# Patient Record
Sex: FEMALE | Race: WHITE | NOT HISPANIC OR LATINO | Employment: FULL TIME | URBAN - METROPOLITAN AREA
[De-identification: names, ages, dates, MRNs, and addresses within clinical notes are randomized per-mention and may not be internally consistent; named-entity substitution may affect disease eponyms.]

---

## 2018-11-15 ENCOUNTER — OFFICE VISIT (OUTPATIENT)
Dept: FAMILY MEDICINE CLINIC | Facility: CLINIC | Age: 23
End: 2018-11-15
Payer: COMMERCIAL

## 2018-11-15 VITALS
SYSTOLIC BLOOD PRESSURE: 130 MMHG | WEIGHT: 207.8 LBS | HEIGHT: 66 IN | HEART RATE: 80 BPM | DIASTOLIC BLOOD PRESSURE: 78 MMHG | BODY MASS INDEX: 33.4 KG/M2

## 2018-11-15 DIAGNOSIS — M25.312 SHOULDER JOINT INSTABILITY, LEFT: ICD-10-CM

## 2018-11-15 DIAGNOSIS — G89.29 CHRONIC LEFT SHOULDER PAIN: Primary | ICD-10-CM

## 2018-11-15 DIAGNOSIS — R29.898 SHOULDER WEAKNESS: ICD-10-CM

## 2018-11-15 DIAGNOSIS — M25.512 CHRONIC LEFT SHOULDER PAIN: Primary | ICD-10-CM

## 2018-11-15 DIAGNOSIS — M25.9 SHOULDER DISORDER: ICD-10-CM

## 2018-11-15 PROBLEM — M25.519 SHOULDER PAIN: Status: ACTIVE | Noted: 2018-02-27

## 2018-11-15 PROBLEM — K52.9 COLITIS: Status: ACTIVE | Noted: 2018-02-27

## 2018-11-15 PROBLEM — F17.200 TOBACCO DEPENDENCE SYNDROME: Status: ACTIVE | Noted: 2018-02-27

## 2018-11-15 PROBLEM — E66.9 OBESITY: Status: ACTIVE | Noted: 2018-02-27

## 2018-11-15 PROBLEM — F32.A DEPRESSIVE DISORDER: Status: ACTIVE | Noted: 2018-02-27

## 2018-11-15 PROBLEM — M41.9 SCOLIOSIS DEFORMITY OF SPINE: Status: ACTIVE | Noted: 2018-02-27

## 2018-11-15 PROCEDURE — 99204 OFFICE O/P NEW MOD 45 MIN: CPT | Performed by: PHYSICIAN ASSISTANT

## 2018-11-15 PROCEDURE — 3008F BODY MASS INDEX DOCD: CPT | Performed by: PHYSICIAN ASSISTANT

## 2018-11-15 RX ORDER — NABUMETONE 750 MG/1
750 TABLET, FILM COATED ORAL 2 TIMES DAILY
Qty: 30 TABLET | Refills: 0 | Status: SHIPPED | OUTPATIENT
Start: 2018-11-15 | End: 2020-03-31

## 2018-11-15 NOTE — LETTER
November 15, 2018     Patient: Dannie Malik   YOB: 1995   Date of Visit: 11/15/2018       To Whom it May Concern:    Karlos Jones is under my professional care  She was seen in my office on 11/15/2018  Pt unable to perform job functions at this time due to a L shoulder disorder  She should not return to work until after MRI and ortho specialist evaluation and treatment  If you have any questions or concerns, please don't hesitate to call           Sincerely,          Cat Danielle PA-C        CC: No Recipients

## 2018-11-15 NOTE — PATIENT INSTRUCTIONS
Problem List Items Addressed This Visit        Other    Shoulder pain - Primary    Relevant Medications    nabumetone (RELAFEN) 750 mg tablet    Other Relevant Orders    MRI shoulder left wo contrast    Ambulatory referral to Orthopedic Surgery    Shoulder joint instability, left    Relevant Medications    nabumetone (RELAFEN) 750 mg tablet    Other Relevant Orders    MRI shoulder left wo contrast    Ambulatory referral to Orthopedic Surgery    Shoulder weakness    Relevant Medications    nabumetone (RELAFEN) 750 mg tablet    Other Relevant Orders    MRI shoulder left wo contrast    Ambulatory referral to Orthopedic Surgery    Shoulder disorder     I am highly suspecting rotator cuff tear  X-ray within normal limits check MRI left shoulder  Refer to [facility] either Dr Margaret Mantilla or Dr Radha Wagner  Relafen 750 mg 1 twice daily with food  Patient may wear sling if more comfortable  Patient is unable to perform her expected job functions due to restrictions of movement and pain left shoulder  Patient does have short-term disability which we will complete any paperwork needed when available           Relevant Medications    nabumetone (RELAFEN) 750 mg tablet    Other Relevant Orders    MRI shoulder left wo contrast    Ambulatory referral to Orthopedic Surgery

## 2018-11-15 NOTE — PROGRESS NOTES
Assessment/Plan:    Shoulder disorder  I am highly suspecting rotator cuff tear  X-ray within normal limits check MRI left shoulder  Refer to [facility]ie either Dr Thiago Mackenzie or Dr Gilmar Pak  Relafen 750 mg 1 twice daily with food  Patient may wear sling if more comfortable  Patient is unable to perform her expected job functions due to restrictions of movement and pain left shoulder  Patient does have short-term disability which we will complete any paperwork needed when available  Diagnoses and all orders for this visit:    Chronic left shoulder pain  -     MRI shoulder left wo contrast; Future  -     nabumetone (RELAFEN) 750 mg tablet; Take 1 tablet (750 mg total) by mouth 2 (two) times a day for 14 days  -     Ambulatory referral to Orthopedic Surgery; Future    Shoulder joint instability, left  -     MRI shoulder left wo contrast; Future  -     nabumetone (RELAFEN) 750 mg tablet; Take 1 tablet (750 mg total) by mouth 2 (two) times a day for 14 days  -     Ambulatory referral to Orthopedic Surgery; Future    Shoulder weakness  -     MRI shoulder left wo contrast; Future  -     nabumetone (RELAFEN) 750 mg tablet; Take 1 tablet (750 mg total) by mouth 2 (two) times a day for 14 days  -     Ambulatory referral to Orthopedic Surgery; Future    Shoulder disorder  -     MRI shoulder left wo contrast; Future  -     nabumetone (RELAFEN) 750 mg tablet; Take 1 tablet (750 mg total) by mouth 2 (two) times a day for 14 days  -     Ambulatory referral to Orthopedic Surgery; Future          Subjective:   CC: Pt  Here to reestablish care with practice  C/o pain in left shoulder and decreased ROM  Pain getting increasingly worse over the last several weeks  suzette     Patient ID: Milton Shaw is a 26 y o  female  Patient here today to get re-established with the office  She is complaining of left shoulder pain and did actually have an x-ray which was normal on the 8th of November this month   L shoulder pain for months and then a few weeks ago got worse and had trouble using her arm to drive or lifting arm at work to even put on her desk  No hx of fall or injury  R handed  Has a desk job  Played volleyball in school for about 5-6 years  No hx of injury in the past either  Tried tylenol, ibuprofen, sling for one week, lidocaine patch all w/o help  Saw [facility] and had xray  Unable to bring arm out in front or to her side  Pt  has a short term disability plan and would like to get this in motion as she is unable to perform her daily duties at work as she can not get her L arm up onto desk to work on keyboard due to limited ROM and pain  The following portions of the patient's history were reviewed and updated as appropriate: allergies, current medications, past family history, past medical history, past social history, past surgical history and problem list     Review of Systems   Constitutional: Negative  HENT: Negative  Eyes: Negative  Respiratory: Negative  Cardiovascular: Negative  Gastrointestinal: Negative  Endocrine: Negative  Genitourinary: Negative  Musculoskeletal:        Left shoulder pain   Skin: Negative  Allergic/Immunologic: Negative  Neurological: Negative  Hematological: Negative  Psychiatric/Behavioral: Negative  Objective:      Vitals:    11/15/18 1207   BP: 130/78   BP Location: Left arm   Patient Position: Sitting   Pulse: 80   Weight: 94 3 kg (207 lb 12 8 oz)   Height: 5' 6" (1 676 m)          Physical Exam   Constitutional: She is oriented to person, place, and time  She appears well-developed and well-nourished  No distress  HENT:   Head: Normocephalic and atraumatic  Eyes: Conjunctivae are normal  Right eye exhibits no discharge  Left eye exhibits no discharge  Neck: Neck supple  Carotid bruit is not present  Cardiovascular: Normal rate  Pulmonary/Chest: Effort normal  No respiratory distress     Musculoskeletal:   L shoulder with significant decrease in motion  Pt unable to bring L arm forward past midline  Pt unable to abduct L arm to side past 30 degrees  No crepitus but pain to ant  Acromion process palpation  L shoulder with a downward and anterior displacement compared with R  Deformity made more apparent with posterior L arm to back maneuver  Good  strength  Painful to active ROM  Neurological: She is alert and oriented to person, place, and time  Skin: Skin is warm and dry  She is not diaphoretic  Psychiatric: She has a normal mood and affect  Judgment normal    Nursing note and vitals reviewed

## 2018-11-15 NOTE — ASSESSMENT & PLAN NOTE
I am highly suspecting rotator cuff tear  X-ray within normal limits check MRI left shoulder  Refer to Jessie either Dr Anna Bennett or Dr Chad Kennedy  Relafen 750 mg 1 twice daily with food  Patient may wear sling if more comfortable  Patient is unable to perform her expected job functions due to restrictions of movement and pain left shoulder  Patient does have short-term disability which we will complete any paperwork needed when available

## 2018-11-21 DIAGNOSIS — M25.312 SHOULDER JOINT INSTABILITY, LEFT: ICD-10-CM

## 2018-11-21 DIAGNOSIS — M25.9 SHOULDER DISORDER: ICD-10-CM

## 2018-11-21 DIAGNOSIS — M25.512 CHRONIC LEFT SHOULDER PAIN: ICD-10-CM

## 2018-11-21 DIAGNOSIS — R29.898 SHOULDER WEAKNESS: ICD-10-CM

## 2018-11-21 DIAGNOSIS — G89.29 CHRONIC LEFT SHOULDER PAIN: ICD-10-CM

## 2020-03-31 ENCOUNTER — OFFICE VISIT (OUTPATIENT)
Dept: FAMILY MEDICINE CLINIC | Facility: CLINIC | Age: 25
End: 2020-03-31
Payer: COMMERCIAL

## 2020-03-31 VITALS
BODY MASS INDEX: 34.72 KG/M2 | DIASTOLIC BLOOD PRESSURE: 78 MMHG | WEIGHT: 216 LBS | HEART RATE: 72 BPM | SYSTOLIC BLOOD PRESSURE: 110 MMHG | HEIGHT: 66 IN

## 2020-03-31 DIAGNOSIS — S39.012A BACK STRAIN, INITIAL ENCOUNTER: ICD-10-CM
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
## 2020-03-31 PROCEDURE — 3008F BODY MASS INDEX DOCD: CPT | Performed by: PHYSICIAN ASSISTANT

## 2020-03-31 PROCEDURE — 99214 OFFICE O/P EST MOD 30 MIN: CPT | Performed by: PHYSICIAN ASSISTANT

## 2020-03-31 RX ORDER — MULTIVITAMIN
1 CAPSULE ORAL DAILY
COMMUNITY
End: 2021-01-11

## 2020-03-31 RX ORDER — METHOCARBAMOL 750 MG/1
750 TABLET, FILM COATED ORAL 3 TIMES DAILY PRN
Qty: 30 TABLET | Refills: 0 | Status: SHIPPED | OUTPATIENT
Start: 2020-03-31 | End: 2021-02-09

## 2020-03-31 RX ORDER — KETOROLAC TROMETHAMINE 10 MG/1
10 TABLET, FILM COATED ORAL 2 TIMES DAILY
Qty: 30 TABLET | Refills: 0 | Status: SHIPPED | OUTPATIENT
Start: 2020-03-31 | End: 2021-03-18

## 2020-03-31 RX ORDER — NAPROXEN 500 MG/1
500 TABLET ORAL 2 TIMES DAILY WITH MEALS
COMMUNITY
End: 2020-03-31
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
## 2020-12-30 ENCOUNTER — TELEMEDICINE (OUTPATIENT)
Dept: FAMILY MEDICINE CLINIC | Facility: CLINIC | Age: 25
End: 2020-12-30
Payer: COMMERCIAL

## 2020-12-30 VITALS — WEIGHT: 207 LBS | HEIGHT: 66 IN | BODY MASS INDEX: 33.27 KG/M2

## 2020-12-30 DIAGNOSIS — Z20.822 CLOSE EXPOSURE TO COVID-19 VIRUS: Primary | ICD-10-CM

## 2020-12-30 PROCEDURE — 99213 OFFICE O/P EST LOW 20 MIN: CPT | Performed by: NURSE PRACTITIONER

## 2021-01-11 ENCOUNTER — ULTRASOUND (OUTPATIENT)
Dept: OBGYN CLINIC | Facility: CLINIC | Age: 26
End: 2021-01-11
Payer: COMMERCIAL

## 2021-01-11 VITALS — DIASTOLIC BLOOD PRESSURE: 74 MMHG | BODY MASS INDEX: 37.19 KG/M2 | WEIGHT: 230.4 LBS | SYSTOLIC BLOOD PRESSURE: 102 MMHG

## 2021-01-11 DIAGNOSIS — N91.1 AMENORRHEA, SECONDARY: Primary | ICD-10-CM

## 2021-01-11 PROCEDURE — 1036F TOBACCO NON-USER: CPT | Performed by: OBSTETRICS & GYNECOLOGY

## 2021-01-11 PROCEDURE — 99204 OFFICE O/P NEW MOD 45 MIN: CPT | Performed by: OBSTETRICS & GYNECOLOGY

## 2021-01-17 PROCEDURE — 76830 TRANSVAGINAL US NON-OB: CPT | Performed by: OBSTETRICS & GYNECOLOGY

## 2021-01-17 NOTE — PROGRESS NOTES
Assessment/Plan:     @ 6 weeks 3 days [facility] 9/3/2021    Repeat sono 2 weeks      Subjective      Daphine Conner is a 23 y o   LMP early November presents with amenorrhea and + urine hCG  Cycle length: irregular  Pregnancy testing: at home  Pregnancy imaging: not done  Past Medical History:   Diagnosis Date    Multiple abrasions     Proteinuria     Urinary tract infection        Current Outpatient Medications on File Prior to Visit   Medication Sig    Prenatal MV-Min-Fe Fum-FA-DHA (PRENATAL+DHA PO) Take by mouth    ketorolac (TORADOL) 10 mg tablet Take 1 tablet (10 mg total) by mouth 2 (two) times a day (Patient not taking: Reported on 2020)    methocarbamol (ROBAXIN) 750 mg tablet Take 1 tablet (750 mg total) by mouth 3 (three) times a day as needed for muscle spasms for up to 20 days     No current facility-administered medications on file prior to visit  The following portions of the patient's history were reviewed and updated as appropriate: allergies, past family history, past social history, past surgical history and problem list     Review of Systems  Pertinent items are noted in HPI  Objective      /74 (BP Location: Left arm, Patient Position: Sitting, Cuff Size: Large)   Wt 105 kg (230 lb 6 4 oz)   LMP 2020 (Approximate)   BMI 37 19 kg/m²     Physical Exam  Constitutional:       Appearance: Normal appearance  Genitourinary:      Vulva normal       No vaginal bleeding  HENT:      Head: Normocephalic  Cardiovascular:      Rate and Rhythm: Normal rate and regular rhythm  Pulmonary:      Effort: Pulmonary effort is normal    Abdominal:      Palpations: Abdomen is soft  Tenderness: There is no abdominal tenderness  Musculoskeletal:         General: No swelling  Neurological:      General: No focal deficit present  Mental Status: She is alert and oriented to person, place, and time  Skin:     General: Skin is warm and dry  Psychiatric:         Mood and Affect: Mood normal          Behavior: Behavior normal    Vitals signs reviewed  AMB US Pelvic Non OB    Date/Time: 1/17/2021 12:53 PM  Performed by: Gavi Flores MD  Authorized by: Gavi Flores MD   Universal Protocol:  Consent: Verbal consent obtained  Consent given by: patient  Time out: Immediately prior to procedure a "time out" was called to verify the correct patient, procedure, equipment, support staff and site/side marked as required  Patient understanding: patient states understanding of the procedure being performed  Patient identity confirmed: verbally with patient      Procedure details:     Indications comment:  Amenorrhea, + urine hCG, unsure dates    Technique:  Transvaginal US, Non-OB    Position: lithotomy exam    Uterine findings:     Adnexal mass: not identified      Myomas: not identified    Cervix findings:      closed  Left ovary findings:     Left ovary:  Visualized    Cysts: not identified    Right ovary findings:     Right ovary:  Visualized    Cysts: not identified    Other findings:     Free pelvic fluid: not identified    Post-Procedure Details:     Impression:  Single viable intrauterine gestation CRL 0 62cm c/w EGA 6 weeks 3 days +yolk sac FHM 130bpm    Tolerance:   Tolerated well, no immediate complications    Complications: no complications

## 2021-01-25 ENCOUNTER — ULTRASOUND (OUTPATIENT)
Dept: OBGYN CLINIC | Facility: CLINIC | Age: 26
End: 2021-01-25
Payer: COMMERCIAL

## 2021-01-25 VITALS — WEIGHT: 229 LBS | SYSTOLIC BLOOD PRESSURE: 112 MMHG | BODY MASS INDEX: 36.96 KG/M2 | DIASTOLIC BLOOD PRESSURE: 64 MMHG

## 2021-01-25 DIAGNOSIS — N91.2 AMENORRHEA: Primary | ICD-10-CM

## 2021-01-25 PROCEDURE — 76830 TRANSVAGINAL US NON-OB: CPT | Performed by: OBSTETRICS & GYNECOLOGY

## 2021-01-25 NOTE — PROGRESS NOTES
AMB US Pelvic Non OB    Date/Time: 1/25/2021 2:21 PM  Performed by: Elbert Azevedo MD  Authorized by: Elbert Azevedo MD   Universal Protocol:  Consent: Verbal consent obtained  Risks and benefits: risks, benefits and alternatives were discussed  Time out: Immediately prior to procedure a "time out" was called to verify the correct patient, procedure, equipment, support staff and site/side marked as required  Patient understanding: patient states understanding of the procedure being performed  Patient identity confirmed: verbally with patient      Procedure details:     Indications comment:  Amenorrhea, + urine hCG, size not c/w dates    Technique:  Transvaginal US, Non-OB    Position: lithotomy exam    Uterine findings:     Adnexal mass: not identified      Myomas: not identified    Cervix findings:      closed  Left ovary findings:     Left ovary:  Visualized    Cysts: not identified    Right ovary findings:     Right ovary:  Visualized    Cysts: not identified    Other findings:     Free pelvic fluid: not identified    Post-Procedure Details:     Impression:  Single viable intrauterine gestation CRL 2 29cm (EGA 9w0d) c/w EGA of 8w 3d by previous sono  + yolk sac   bpm       Tolerance:   Tolerated well, no immediate complications  Additional Procedure Comments:      [facility] 9/3/2021

## 2021-02-09 ENCOUNTER — TELEMEDICINE (OUTPATIENT)
Dept: OBGYN CLINIC | Facility: CLINIC | Age: 26
End: 2021-02-09

## 2021-02-09 VITALS — HEIGHT: 66 IN | WEIGHT: 229 LBS | BODY MASS INDEX: 36.8 KG/M2

## 2021-02-09 DIAGNOSIS — Z34.01 FIRST PREGNANCY, FIRST TRIMESTER: Primary | ICD-10-CM

## 2021-02-09 PROCEDURE — OBC: Performed by: OBSTETRICS & GYNECOLOGY

## 2021-02-09 RX ORDER — OMEPRAZOLE 20 MG/1
20 CAPSULE, DELAYED RELEASE ORAL DAILY
COMMUNITY
End: 2021-03-18

## 2021-02-09 RX ORDER — DIPHENHYDRAMINE HYDROCHLORIDE 25 MG/1
25 CAPSULE ORAL DAILY
COMMUNITY
End: 2021-03-18

## 2021-02-09 NOTE — PATIENT INSTRUCTIONS
Pregnancy at 11 to 14 Weeks   AMBULATORY CARE:   Changes happening to your body: You are now at the end of your first trimester and entering your second trimester  Morning sickness usually goes away by this time  You may have other symptoms such as fatigue, frequent urination, and headaches  You may have gained 2 to 4 pounds by now  Seek care immediately if:   · You have pain or cramping in your abdomen or low back  · You have heavy vaginal bleeding or clotting  · You pass material that looks like tissue or large clots  Collect the material and bring it with you  Call your doctor or obstetrician if:   · You cannot keep food or drinks down, and you are losing weight  · You have light vaginal bleeding  · You have chills or a fever  · You have vaginal itching, burning, or pain  · You have yellow, green, white, or foul-smelling vaginal discharge  · You have pain or burning when you urinate, less urine than usual, or pink or bloody urine  · You have questions or concerns about your condition or care  How to care for yourself at this stage of your pregnancy:   · Get plenty of rest   You may feel more tired than normal  You may need to take naps or go to bed earlier  · Manage nausea and vomiting  Avoid fatty and spicy foods  Eat small meals throughout the day instead of large meals  Elizabeth may help to decrease nausea  Ask your healthcare provider about other ways of decreasing nausea and vomiting  · Eat a variety of healthy foods  Healthy foods include fruits, vegetables, whole-grain breads, low-fat dairy foods, beans, lean meats, and fish  Drink liquids as directed  Ask how much liquid to drink each day and which liquids are best for you  Limit caffeine to less than 200 milligrams each day  Limit your intake of fish to 2 servings each week  Choose fish low in mercury such as canned light tuna, shrimp, salmon, cod, or tilapia   Do not  eat fish high in mercury such as swordfish, tilefish, kelly mackerel, and shark  · Take prenatal vitamins as directed  Your need for certain vitamins and minerals, such as folic acid, increases during pregnancy  Prenatal vitamins provide some of the extra vitamins and minerals you need  Prenatal vitamins may also help to decrease the risk of certain birth defects  · Do not smoke  Smoking increases your risk of a miscarriage and other health problems during your pregnancy  Smoking can cause your baby to be born too early or weigh less at birth  Ask your healthcare provider for information if you need help quitting  · Do not drink alcohol  Alcohol passes from your body to your baby through the placenta  It can affect your baby's brain development and cause fetal alcohol syndrome (FAS)  FAS is a group of conditions that causes mental, behavior, and growth problems  · Talk to your healthcare provider before you take any medicines  Many medicines may harm your baby if you take them when you are pregnant  Do not take any medicines, vitamins, herbs, or supplements without first talking to your healthcare provider  Never use illegal or street drugs (such as marijuana or cocaine) while you are pregnant  Safety tips during pregnancy:   · Avoid hot tubs and saunas  Do not use a hot tub or sauna while you are pregnant, especially during your first trimester  Hot tubs and saunas may raise your baby's temperature and increase the risk of birth defects  · Avoid toxoplasmosis  This is an infection caused by eating raw meat or being around infected cat feces  It can cause birth defects, miscarriages, and other problems  Wash your hands after you touch raw meat  Make sure any meat is well-cooked before you eat it  Avoid raw eggs and unpasteurized milk  Use gloves or ask someone else to clean your cat's litter box while you are pregnant  Changes happening with your baby: Your baby has fully formed fingernails and toenails   Your baby's heartbeat can now be heard  Ask your healthcare provider if you can listen to your baby's heartbeat  By week 14, your baby is over 4 inches long from the top of the head to the rump (baby's bottom)  Your baby weighs over 3 ounces  Prenatal care:  Prenatal care is a series of visits with your healthcare provider throughout your pregnancy  During the first 28 weeks of your pregnancy, you will see your healthcare provider 1 time each month  Prenatal care can help prevent problems during pregnancy and childbirth  Your healthcare provider will check your blood pressure and weight  Your baby's heart rate will also be checked  You may also need the following at some visits:  · A pelvic exam  allows your healthcare provider to see your cervix (the bottom part of your uterus)  Your healthcare provider will use a speculum to open your vagina  He or she will check the size and shape of your uterus  · Blood tests  may be done to check for any of the following:     ? Gestational diabetes or anemia (low iron level)    ? Blood type or Rh factor, or certain birth defects    ? Immunity to certain diseases, such as chickenpox or rubella    ? An infection, such as a sexually transmitted infection, HIV, or hepatitis B    · Hepatitis B  may need to be prevented or treated  Hepatitis B is inflammation of the liver caused by the hepatitis B virus (HBV)  HBV can spread from a mother to her baby during delivery  You will be checked for HBV as early as possible in the first trimester of each pregnancy  You need the test even if you received the hepatitis B vaccine or were tested before  You may need to have an HBV infection treated before you give birth  · Urine tests  may also be done to check for sugar and protein  These can be signs of gestational diabetes or preeclampsia  Urine tests may also be done to check for signs of infection  · A fetal ultrasound  shows pictures of your baby inside your uterus   The pictures are used to check your baby's development, movement, and position  · Genetic disorder screening tests  may be offered to you  These tests check your baby's risk for genetic disorders such as Down syndrome  A screening test includes a blood test and ultrasound  Follow up with your doctor or obstetrician as directed:  Go to all prenatal visits  Write down your questions so you remember to ask them during your visits  © Copyright [address] Information is for End User's use only and may not be sold, redistributed or otherwise used for commercial purposes  All illustrations and images included in CareNotes® are the copyrighted property of A D A M , Inc  or [address] [place] [address] [facility]  The above information is an  only  It is not intended as medical advice for individual conditions or treatments  Talk to your doctor, nurse or pharmacist before following any medical regimen to see if it is safe and effective for you  Nausea and Vomiting in Pregnancy   AMBULATORY CARE:   Nausea and vomiting in pregnancy  can happen any time of day  These symptoms usually start before the 9th week of pregnancy, and end by the 14th week (second trimester)  Some women can have nausea and vomiting for a longer time  These symptoms can affect some women throughout the entire pregnancy  Nausea and vomiting do not harm your baby  These symptoms can make it hard for you to do your daily activities  Seek care immediately if:   · You have signs of dehydration  Examples are dark yellow urine, dry mouth and lips, dry skin, fast heartbeat, and urinating less than usual     · You have severe abdominal pain  · You feel too weak or dizzy to stand up  · You see blood in your vomit or bowel movements  Contact your healthcare provider if:   · You vomit more than 4 times in 1 day  · You have not been able to keep liquids down for more than 1 day  · You lose more than 2 pounds  · You have a fever      · Your nausea and vomiting continue longer than 14 weeks  · You have questions or concerns about your condition or care  Treatment  for nausea and vomiting in pregnancy is usually not needed  You can make changes in the foods you eat and in your activities to help manage your symptoms  You may need to try several things to learn what works for you  Talk to your healthcare provider if your symptoms do not decrease with the changes suggested below  You may need vitamin B6 and medicine if these changes do not help, or your symptoms become severe  Nutrition changes you can make to manage nausea and vomiting:   · Eat small meals throughout the day instead of 3 large meals  You may be more likely to have nausea and vomiting when your stomach is empty  Eat foods that are low in fat and high in protein  Examples are lean meat, beans, turkey, and chicken without the skin  Eat a small snack, such as crackers, dry cereal, or a small sandwich before you go to bed  · Eat some crackers or dry toast before you get out of bed in the morning  Get out of bed slowly  Sudden movements could cause you to get dizzy and nauseated  · Eat bland foods when you feel nauseated  Examples of bland foods include dry toast, dry cereal, plain pasta, white rice, and bread  Other bland foods include saltine crackers, bananas, gelatin, and pretzels  Avoid spicy, greasy, and fried foods  Avoid any other foods that make you feel nauseated  · Drink liquids that contain ginger  Drink ginger ale made with real ginger or ginger tea made with fresh grated ginger  Ginger capsules or ginger candies may also help to decrease nausea and vomiting  · Drink liquids between meals instead of with meals  Wait at least 30 minutes after you eat to drink liquids  Drink small amounts of liquids often throughout the day to prevent dehydration  Ask how much liquid you should drink each day      Other changes you can make to manage nausea and vomiting:   · Avoid smells that bother you   Strong odors may cause nausea and vomiting to start, or make it worse  Take a short walk, turn on a fan, or try to sleep with the window open to get fresh air  When you are cooking, open windows to get rid of smells that may cause nausea  · Do not brush your teeth right after you eat  if it makes you nauseated  · Rest when you need to  Start activity slowly and work up to your usual routine as you start to feel better  · Talk to your healthcare provider about your prenatal vitamins  Prenatal vitamins can cause nausea for some women  Try taking your prenatal vitamin at night or with a snack  If this change does not help, your healthcare provider may recommend a different type of vitamin  · Do not use any medicines, vitamins, or supplements to manage your symptoms without asking your healthcare provider  Many medicines can harm an unborn baby  · Light to moderate exercise  may help to decrease your symptoms  It may also help you to sleep better at night  Ask your healthcare provider about the best exercise plan for you  Follow up with your healthcare provider as directed:  Write down your questions so you remember to ask them during your visits  © Copyright [address] Information is for End User's use only and may not be sold, redistributed or otherwise used for commercial purposes  All illustrations and images included in CareNotes® are the copyrighted property of A [employer] , [employer]  or [address] [place], [place] [address]lindsay   The above information is an  only  It is not intended as medical advice for individual conditions or treatments  Talk to your doctor, nurse or pharmacist before following any medical regimen to see if it is safe and effective for you

## 2021-02-09 NOTE — PROGRESS NOTES
OB INTAKE INTERVIEW  **  *Hx of  delivery prior to 36 weeks 6 days: No  *Last Menstrual Period: Pt's LMP was: November  *Ultrasound date:21   6weeks 3days  *Estimated date of delivery: 9/3/21   * Confirmed by: US    *Signs/Symptoms of Pregnancy   *Current pregnancy symptoms: Nausea, & vomiting approx 4 x a day since last week, now has decreased to 1-2 daily and does have days with no vomiting, Advised smaller food portions, dry crackers, toast, dry cereal, and to keep hydrated    *Constipation - Yes, having some issues with BM's, not going as frequently as normal but has not been eating as well  *Headaches - No   *Cramping/spotting - Yes, occasional cramping in the morning, resolves on its own    *PICA cravings - No    *Diabetes- If you answer YES to 1 of the following, please order 1 hour GTT, 50g    *History of GDM: No    *BMI >35: Yes    *History of PCOS and/or on Metformin: No    *Prior history of LGA/Macrosomia (>9lb):  No    -If you answer YES to 2 or more of the following, please order 1 hour GTT, 50g    *BMI >30: Yes  *First degree relative with type 2 diabetes: No    *AMA with other risk factors: No    *History of CHTN, Hyperlipidemia, Elevated A1c: No    *High risk race (, , ,  or [place]): No,     *Hypertension- if you answer yes, please order preeclampsia labs including 24 hour urine protein   *Hx of chronic HTN: No   *Hx of gestational HTN: No   *Hx of preeclampsia, eclampsia, or HELLP syndrome: No    *Infection Screening-    *Does the pt have a hx of MRSA?: No    *If yes- please follow MRSA protocol and obtain a nasal swab for MRSA culture   *History of herpes?: No   *Ok for blood transfusion: Yes    *Immunizations:   *Influenza vaccine given today: No   *Discussed Tdap vaccine: Yes     *Interview education   *[facility] Pregnancy Essentials Book reviewed and discussed: Yes    *Handouts given:    *[facility] support center    *[facility]     *Discussed genetic testing: Yes     *Appointment at [facility] made: Yes       *Routine Prenatal lab work ordered: Yes     *Did patients risk factors prompt additional lab work at this time?: Yes     *Nurse/Family Partnership- pt may qualify Yes; referral placed Yes     *4 P's- substance abuse screening    Presently using? No    Past use? No    Partner using? No    Parents/Family using? No    *Details that I feel the provider should be aware of:  , increased BMI, early glucose test ordered  Works from home as a  for [address] [place] [address]  Advised to call [facility] for appointment as well as having prenatal labs done  PN1 visit scheduled  The patient was oriented to our practice and all questions were answered    Telephone visit lasting approx 50 mins     Interviewed by: Sonu ODOM

## 2021-02-19 ENCOUNTER — TELEPHONE (OUTPATIENT)
Dept: PERINATAL CARE | Facility: OTHER | Age: 26
End: 2021-02-19

## 2021-02-19 NOTE — TELEPHONE ENCOUNTER
Spoke with patient and confirmed appointment with [facility]  1 support person ( must be over age of 15) may accompany you for your appointment  You and your support person must wear a mask ( [place] Dept of Health)  [facility] does not allow cell phone use, recording device or streaming during the ultrasound  Instructed to call [facility] office prior to entering building  Check in and rooming questions will be done via phone  Inside office # provided:  ?    [employer] line: [phone] Do you or your support person currently have:  Fever or flu- like symptoms? NO  Symptoms of upper respiratory infection like runny nose, sore throat or cough? NO  Do you have new headache that you have not had in the past?NO  Have you experienced any new shortness of breath recently? NO  Do you have any new diarrhea, nausea or vomiting? NO  Have you recently been in contact with anyone who has been sick or diagnosed with COVID-19 infection? NO  Have you been recommended to quarantine because of an exposure to a confirmed positive COVID19 person? NO  You and your support person will be screened upon arrival   ?  Patient verbalized understanding of all instructions    YES

## 2021-02-22 ENCOUNTER — ROUTINE PRENATAL (OUTPATIENT)
Dept: PERINATAL CARE | Facility: OTHER | Age: 26
End: 2021-02-22
Payer: COMMERCIAL

## 2021-02-22 VITALS
SYSTOLIC BLOOD PRESSURE: 130 MMHG | HEART RATE: 98 BPM | DIASTOLIC BLOOD PRESSURE: 61 MMHG | WEIGHT: 220 LBS | HEIGHT: 66 IN | BODY MASS INDEX: 35.36 KG/M2

## 2021-02-22 DIAGNOSIS — O99.211 MATERNAL OBESITY, ANTEPARTUM, FIRST TRIMESTER: Primary | ICD-10-CM

## 2021-02-22 DIAGNOSIS — Z3A.12 12 WEEKS GESTATION OF PREGNANCY: ICD-10-CM

## 2021-02-22 DIAGNOSIS — Z36.82 ENCOUNTER FOR ANTENATAL SCREENING FOR NUCHAL TRANSLUCENCY: ICD-10-CM

## 2021-02-22 DIAGNOSIS — O99.331 TOBACCO USE IN PREGNANCY, FIRST TRIMESTER: ICD-10-CM

## 2021-02-22 PROCEDURE — 76813 OB US NUCHAL MEAS 1 GEST: CPT | Performed by: OBSTETRICS & GYNECOLOGY

## 2021-02-22 PROCEDURE — 3008F BODY MASS INDEX DOCD: CPT | Performed by: OBSTETRICS & GYNECOLOGY

## 2021-02-22 PROCEDURE — 99203 OFFICE O/P NEW LOW 30 MIN: CPT | Performed by: OBSTETRICS & GYNECOLOGY

## 2021-02-22 PROCEDURE — 1036F TOBACCO NON-USER: CPT | Performed by: OBSTETRICS & GYNECOLOGY

## 2021-02-22 RX ORDER — ASPIRIN 81 MG/1
162 TABLET, CHEWABLE ORAL DAILY
Qty: 180 TABLET | Refills: 1 | Status: SHIPPED | OUTPATIENT
Start: 2021-02-22 | End: 2021-08-19

## 2021-02-22 NOTE — PROGRESS NOTES
Please refer to the [facility] ultrasound report in Ob Procedures for additional information regarding today's visit

## 2021-02-22 NOTE — LETTER
February 22, 2021     Lacey FrenchTitus, [address] [place] [address]    Patient: Coleen Fletcher   YOB: 1995   Date of Visit: 2/22/2021       Dear Dr Gregory Bee: Thank you for referring Claudell Crete to me for evaluation  Below are my notes for this consultation  If you have questions, please do not hesitate to call me  I look forward to following your patient along with you  Sincerely,        Kayla Howe MD        CC: No Recipients  Kayla Howe MD  2/22/2021 10:38 AM  Sign when Signing Visit    Please refer to the [facility] ultrasound report in Ob Procedures for additional information regarding today's visit

## 2021-02-22 NOTE — PROGRESS NOTES
[id number] lab ordered  Instructed patient on process for checking her OOP cost via [employer] [facility]  Provided [id number] instruction card toll free # [phone]  Patient made aware if [id number]  unable to give an estimate she will need to contact M office prior to blood draw  Patient aware that  is provided by third party and is only an estimate of cost not a guarantee  Insurance may require prior authorization, if test drawn without prior authorization she will be responsible for full cost of test   For definitive OOP cost, lab deductible or if lab authorization is required patient encouraged to call her insurance provider  Explained customer service insurance phone # located on the back of her ID card  [facility] will have results in approximately 7-10 business days and will call patient or notify via Diana Carlson  Patient aware viewing lab result reveals gender  Patient verbalized understanding of all instructions and no questions at this time

## 2021-02-26 ENCOUNTER — ROUTINE PRENATAL (OUTPATIENT)
Dept: OBGYN CLINIC | Facility: CLINIC | Age: 26
End: 2021-02-26

## 2021-02-26 VITALS — BODY MASS INDEX: 35.25 KG/M2 | SYSTOLIC BLOOD PRESSURE: 118 MMHG | WEIGHT: 218.4 LBS | DIASTOLIC BLOOD PRESSURE: 64 MMHG

## 2021-02-26 DIAGNOSIS — Z34.02 ENCOUNTER FOR SUPERVISION OF NORMAL FIRST PREGNANCY IN SECOND TRIMESTER: Primary | ICD-10-CM

## 2021-02-26 DIAGNOSIS — Z11.3 SCREENING FOR STD (SEXUALLY TRANSMITTED DISEASE): ICD-10-CM

## 2021-02-26 DIAGNOSIS — Z34.01 FIRST PREGNANCY, FIRST TRIMESTER: ICD-10-CM

## 2021-02-26 LAB
SL AMB  POCT GLUCOSE, UA: ABNORMAL
SL AMB POCT URINE PROTEIN: ABNORMAL

## 2021-02-26 PROCEDURE — G0145 SCR C/V CYTO,THINLAYER,RESCR: HCPCS | Performed by: OBSTETRICS & GYNECOLOGY

## 2021-02-26 PROCEDURE — PNV: Performed by: OBSTETRICS & GYNECOLOGY

## 2021-02-26 PROCEDURE — 87491 CHLMYD TRACH DNA AMP PROBE: CPT | Performed by: OBSTETRICS & GYNECOLOGY

## 2021-02-26 PROCEDURE — 87591 N.GONORRHOEAE DNA AMP PROB: CPT | Performed by: OBSTETRICS & GYNECOLOGY

## 2021-02-26 NOTE — PROGRESS NOTES
First OB visit  Blue folder given  Denies leaking of fluids or vaginal bleeding  Left lower quadrant "cramping"  No PN1 labs done including glucose  Pap/GC/CH today

## 2021-02-26 NOTE — PROGRESS NOTES
Assessment:    Pregnancy at 13 and 0/7 weeks      Plan     Initial labs ordered  Prenatal vitamins  Problem list reviewed and updated  Had genetic screening sono - normal  Role of ultrasound in pregnancy discussed; fetal survey: ordered  Follow up in 4 weeks  Greater than 50% of 30 min visit spent on counseling and coordination of care  Adrianna Crockett is being seen today for her first obstetrical visit  This is a planned pregnancy  She is at 13w0d gestation  Her obstetrical history is significant for obesity  Relationship with FOB: significant other, living together  Patient does intend to breast feed  Pregnancy history fully reviewed  Menstrual History:  OB History        1    Para        Term                AB        Living           SAB        TAB        Ectopic        Multiple        Live Births                    Patient's last menstrual period was 2020 (approximate)  Past Medical History:   Diagnosis Date    Chronic back pain     Multiple abrasions     Proteinuria     Urinary tract infection     pylonephritis       Past Surgical History:   Procedure Laterality Date    TONSILLECTOMY  2009    WISDOM TOOTH EXTRACTION         Current Outpatient Medications on File Prior to Visit   Medication Sig    aspirin 81 mg chewable tablet Chew 2 tablets (162 mg total) daily    Doxylamine Succinate, Sleep, (UNISOM PO) Take 12 5 mg by mouth    ketorolac (TORADOL) 10 mg tablet Take 1 tablet (10 mg total) by mouth 2 (two) times a day (Patient not taking: Reported on 2020)    omeprazole (PriLOSEC) 20 mg delayed release capsule Take 20 mg by mouth daily    Prenatal MV-Min-Fe Fum-FA-DHA (PRENATAL+DHA PO) Take by mouth    Pyridoxine HCl (vitamin B-6) 25 MG tablet Take 25 mg by mouth daily     No current facility-administered medications on file prior to visit          No Known Allergies    Social History     Tobacco Use    Smoking status: Former Smoker Quit date: 2020     Years since quittin 2    Smokeless tobacco: Never Used   Substance Use Topics    Alcohol use: Not Currently    Drug use: No       Family History   Problem Relation Age of Onset    Hypertension Mother    Gabby Muljose alfredo Arthritis Mother     Depression Mother     COPD Mother     Gestational diabetes Mother     Lung cancer Paternal Grandmother     Cancer Paternal Grandmother     COPD Father     Depression Sister     Anxiety disorder Sister     Depression Brother     Bipolar disorder Brother     Depression Brother     No Known Problems Maternal Grandmother     COPD Maternal Grandfather     Cancer Maternal Grandfather     Heart disease Paternal Grandfather     Depression Brother        Review of Systems  Pertinent items are noted in HPI        Objective  Vitals:    21 0910   BP: 118/64         See prenatal physical

## 2021-03-02 LAB
C TRACH DNA SPEC QL NAA+PROBE: NEGATIVE
N GONORRHOEA DNA SPEC QL NAA+PROBE: NEGATIVE

## 2021-03-03 LAB
LAB AP GYN PRIMARY INTERPRETATION: NORMAL
Lab: NORMAL

## 2021-03-18 ENCOUNTER — HOSPITAL ENCOUNTER (EMERGENCY)
Facility: HOSPITAL | Age: 26
Discharge: HOME | End: 2021-03-18
Attending: EMERGENCY MEDICINE
Payer: COMMERCIAL

## 2021-03-18 ENCOUNTER — TELEPHONE (OUTPATIENT)
Dept: OBGYN CLINIC | Facility: CLINIC | Age: 26
End: 2021-03-18

## 2021-03-18 VITALS
SYSTOLIC BLOOD PRESSURE: 110 MMHG | WEIGHT: 218.26 LBS | TEMPERATURE: 99 F | OXYGEN SATURATION: 96 % | BODY MASS INDEX: 35.23 KG/M2 | HEART RATE: 84 BPM | DIASTOLIC BLOOD PRESSURE: 63 MMHG | RESPIRATION RATE: 18 BRPM

## 2021-03-18 DIAGNOSIS — R06.02 SOB (SHORTNESS OF BREATH): Primary | ICD-10-CM

## 2021-03-18 DIAGNOSIS — R51.9 HEADACHE: Primary | ICD-10-CM

## 2021-03-18 DIAGNOSIS — Z3A.15 15 WEEKS GESTATION OF PREGNANCY: ICD-10-CM

## 2021-03-18 LAB
ALBUMIN SERPL BCP-MCNC: 2.8 G/DL (ref 3.5–5)
ALP SERPL-CCNC: 94 U/L (ref 46–116)
ALT SERPL W P-5'-P-CCNC: 16 U/L (ref 12–78)
ANION GAP SERPL CALCULATED.3IONS-SCNC: 8 MMOL/L (ref 4–13)
AST SERPL W P-5'-P-CCNC: 7 U/L (ref 5–45)
ATRIAL RATE: 88 BPM
BASOPHILS # BLD AUTO: 0.06 THOUSANDS/ΜL (ref 0–0.1)
BASOPHILS NFR BLD AUTO: 0 % (ref 0–1)
BILIRUB SERPL-MCNC: 0.11 MG/DL (ref 0.2–1)
BILIRUB UR QL STRIP: NEGATIVE
BUN SERPL-MCNC: 6 MG/DL (ref 5–25)
CALCIUM ALBUM COR SERPL-MCNC: 10.2 MG/DL (ref 8.3–10.1)
CALCIUM SERPL-MCNC: 9.2 MG/DL (ref 8.3–10.1)
CHLORIDE SERPL-SCNC: 103 MMOL/L (ref 100–108)
CLARITY UR: NORMAL
CO2 SERPL-SCNC: 24 MMOL/L (ref 21–32)
COLOR UR: YELLOW
CREAT SERPL-MCNC: 0.62 MG/DL (ref 0.6–1.3)
EOSINOPHIL # BLD AUTO: 0.08 THOUSAND/ΜL (ref 0–0.61)
EOSINOPHIL NFR BLD AUTO: 1 % (ref 0–6)
ERYTHROCYTE [DISTWIDTH] IN BLOOD BY AUTOMATED COUNT: 14.4 % (ref 11.6–15.1)
FLUAV RNA RESP QL NAA+PROBE: NEGATIVE
FLUBV RNA RESP QL NAA+PROBE: NEGATIVE
GFR SERPL CREATININE-BSD FRML MDRD: 128 ML/MIN/1.73SQ M
GLUCOSE SERPL-MCNC: 85 MG/DL (ref 65–140)
GLUCOSE UR STRIP-MCNC: NEGATIVE MG/DL
HCT VFR BLD AUTO: 36.2 % (ref 34.8–46.1)
HGB BLD-MCNC: 11.9 G/DL (ref 11.5–15.4)
HGB UR QL STRIP.AUTO: NEGATIVE
IMM GRANULOCYTES # BLD AUTO: 0.09 THOUSAND/UL (ref 0–0.2)
IMM GRANULOCYTES NFR BLD AUTO: 1 % (ref 0–2)
KETONES UR STRIP-MCNC: NEGATIVE MG/DL
LEUKOCYTE ESTERASE UR QL STRIP: NEGATIVE
LYMPHOCYTES # BLD AUTO: 2.6 THOUSANDS/ΜL (ref 0.6–4.47)
LYMPHOCYTES NFR BLD AUTO: 19 % (ref 14–44)
MCH RBC QN AUTO: 28.5 PG (ref 26.8–34.3)
MCHC RBC AUTO-ENTMCNC: 32.9 G/DL (ref 31.4–37.4)
MCV RBC AUTO: 87 FL (ref 82–98)
MONOCYTES # BLD AUTO: 0.84 THOUSAND/ΜL (ref 0.17–1.22)
MONOCYTES NFR BLD AUTO: 6 % (ref 4–12)
NEUTROPHILS # BLD AUTO: 9.91 THOUSANDS/ΜL (ref 1.85–7.62)
NEUTS SEG NFR BLD AUTO: 73 % (ref 43–75)
NITRITE UR QL STRIP: NEGATIVE
NRBC BLD AUTO-RTO: 0 /100 WBCS
P AXIS: 60 DEGREES
PH UR STRIP.AUTO: 6 [PH] (ref 4.5–8)
PLATELET # BLD AUTO: 209 THOUSANDS/UL (ref 149–390)
PMV BLD AUTO: 12.3 FL (ref 8.9–12.7)
POTASSIUM SERPL-SCNC: 3.8 MMOL/L (ref 3.5–5.3)
PR INTERVAL: 150 MS
PROT SERPL-MCNC: 7 G/DL (ref 6.4–8.2)
PROT UR STRIP-MCNC: NEGATIVE MG/DL
QRS AXIS: 68 DEGREES
QRSD INTERVAL: 86 MS
QT INTERVAL: 340 MS
QTC INTERVAL: 411 MS
RBC # BLD AUTO: 4.17 MILLION/UL (ref 3.81–5.12)
RSV RNA RESP QL NAA+PROBE: NEGATIVE
SARS-COV-2 RNA RESP QL NAA+PROBE: NEGATIVE
SODIUM SERPL-SCNC: 135 MMOL/L (ref 136–145)
SP GR UR STRIP.AUTO: 1.02 (ref 1–1.03)
T WAVE AXIS: 0 DEGREES
TSH SERPL DL<=0.05 MIU/L-ACNC: 2.06 UIU/ML (ref 0.36–3.74)
UROBILINOGEN UR QL STRIP.AUTO: 0.2 E.U./DL
VENTRICULAR RATE: 88 BPM
WBC # BLD AUTO: 13.58 THOUSAND/UL (ref 4.31–10.16)

## 2021-03-18 PROCEDURE — 80053 COMPREHEN METABOLIC PANEL: CPT | Performed by: EMERGENCY MEDICINE

## 2021-03-18 PROCEDURE — 0241U HB NFCT DS VIR RESP RNA 4 TRGT: CPT | Performed by: EMERGENCY MEDICINE

## 2021-03-18 PROCEDURE — 99284 EMERGENCY DEPT VISIT MOD MDM: CPT | Performed by: EMERGENCY MEDICINE

## 2021-03-18 PROCEDURE — 84443 ASSAY THYROID STIM HORMONE: CPT | Performed by: EMERGENCY MEDICINE

## 2021-03-18 PROCEDURE — 93010 ELECTROCARDIOGRAM REPORT: CPT | Performed by: INTERNAL MEDICINE

## 2021-03-18 PROCEDURE — 85025 COMPLETE CBC W/AUTO DIFF WBC: CPT | Performed by: EMERGENCY MEDICINE

## 2021-03-18 PROCEDURE — 96374 THER/PROPH/DIAG INJ IV PUSH: CPT

## 2021-03-18 PROCEDURE — 93005 ELECTROCARDIOGRAM TRACING: CPT

## 2021-03-18 PROCEDURE — 99285 EMERGENCY DEPT VISIT HI MDM: CPT

## 2021-03-18 PROCEDURE — 96375 TX/PRO/DX INJ NEW DRUG ADDON: CPT

## 2021-03-18 PROCEDURE — 96361 HYDRATE IV INFUSION ADD-ON: CPT

## 2021-03-18 PROCEDURE — 81003 URINALYSIS AUTO W/O SCOPE: CPT

## 2021-03-18 PROCEDURE — 87086 URINE CULTURE/COLONY COUNT: CPT

## 2021-03-18 PROCEDURE — 36415 COLL VENOUS BLD VENIPUNCTURE: CPT | Performed by: EMERGENCY MEDICINE

## 2021-03-18 RX ORDER — SODIUM CHLORIDE, SODIUM LACTATE, POTASSIUM CHLORIDE, CALCIUM CHLORIDE 600; 310; 30; 20 MG/100ML; MG/100ML; MG/100ML; MG/100ML
1000 INJECTION, SOLUTION INTRAVENOUS ONCE
Status: COMPLETED | OUTPATIENT
Start: 2021-03-18 | End: 2021-03-18

## 2021-03-18 RX ORDER — DIPHENHYDRAMINE HYDROCHLORIDE 50 MG/ML
25 INJECTION INTRAMUSCULAR; INTRAVENOUS ONCE
Status: COMPLETED | OUTPATIENT
Start: 2021-03-18 | End: 2021-03-18

## 2021-03-18 RX ORDER — ACETAMINOPHEN 325 MG/1
975 TABLET ORAL ONCE
Status: COMPLETED | OUTPATIENT
Start: 2021-03-18 | End: 2021-03-18

## 2021-03-18 RX ORDER — METOCLOPRAMIDE HYDROCHLORIDE 5 MG/ML
10 INJECTION INTRAMUSCULAR; INTRAVENOUS ONCE
Status: COMPLETED | OUTPATIENT
Start: 2021-03-18 | End: 2021-03-18

## 2021-03-18 RX ADMIN — DIPHENHYDRAMINE HYDROCHLORIDE 25 MG: 50 INJECTION, SOLUTION INTRAMUSCULAR; INTRAVENOUS at 17:15

## 2021-03-18 RX ADMIN — ACETAMINOPHEN 975 MG: 325 TABLET ORAL at 17:13

## 2021-03-18 RX ADMIN — METOCLOPRAMIDE 10 MG: 5 INJECTION, SOLUTION INTRAMUSCULAR; INTRAVENOUS at 17:16

## 2021-03-18 RX ADMIN — SODIUM CHLORIDE, SODIUM LACTATE, POTASSIUM CHLORIDE, AND CALCIUM CHLORIDE 1000 ML: .6; .31; .03; .02 INJECTION, SOLUTION INTRAVENOUS at 17:14

## 2021-03-18 NOTE — ED PROVIDER NOTES
History  Chief Complaint   Patient presents with    Abdominal Pain     pt c/o LLQ abd  pain with some SOB for the past x3 days  pt states it gets worse with ambulation  pt denies cp/n/v/d  pt deneis fevers   Shortness of Breath     24 yo F  15w6d gestation presenting with HA and feeling generally off  States nagging HA that past few days, mostly frontal, gradual onset, now 5/10  No meds taken PTA  No head trauma/injury  Reports that after getting out of the shower around 2pm, 3 hours ago she started to feeling "dizzy"/"off"  Pt has trouble describing specifically  Not vertigo  Reports that she feels like there is some fuzziness to her vision in periphery b/l, wears glasses at baseline  States eating/drinking normally  Denies fevers, chills, neck pain/stiffness, double vision, pain with eye movements, numbness, weakness, CP, N/V/D/C, urinary complaints, vaginal discharge/bleeding, calf pain/swelling, peripheral edema  She reports L inguinal/low abdominal pain with movement that has been present all pregnancy and is unchanged   [id number]  States uncomplicated pregnancy so far    MDM: 24 yo F with HA/feeling slightly off, will get labs to r/o metabolic derangement, EKG to r/o arrhythmia, assess fetal heart tones, UA to assess for asymptomatic bacteruria, discuss with OB                 Prior to Admission Medications   Prescriptions Last Dose Informant Patient Reported? Taking?    Prenatal MV-Min-Fe Fum-FA-DHA (PRENATAL+DHA PO)  Self Yes Yes   Sig: Take by mouth   aspirin 81 mg chewable tablet   No Yes   Sig: Chew 2 tablets (162 mg total) daily      Facility-Administered Medications: None       Past Medical History:   Diagnosis Date    Chronic back pain     Multiple abrasions     Proteinuria     Urinary tract infection     pylonephritis       Past Surgical History:   Procedure Laterality Date    TONSILLECTOMY  2009    WISDOM TOOTH EXTRACTION         Family History   Problem Relation Age of Onset    Hypertension Mother    Linnea Seller Arthritis Mother     Depression Mother     COPD Mother     Gestational diabetes Mother     Lung cancer Paternal Grandmother     Cancer Paternal Grandmother     COPD Father     Depression Sister     Anxiety disorder Sister     Depression Brother     Bipolar disorder Brother     Depression Brother     No Known Problems Maternal Grandmother     COPD Maternal Grandfather     Cancer Maternal Grandfather     Heart disease Paternal Grandfather     Depression Brother      I have reviewed and agree with the history as documented  E-Cigarette/Vaping    E-Cigarette Use Never User      E-Cigarette/Vaping Substances     Social History     Tobacco Use    Smoking status: Former Smoker     Quit date: 2020     Years since quittin 2    Smokeless tobacco: Never Used   Substance Use Topics    Alcohol use: Not Currently    Drug use: No       Review of Systems   Constitutional: Negative for chills, fever and unexpected weight change  HENT: Negative for ear pain, rhinorrhea and sore throat  Eyes: Negative for photophobia, pain, discharge and redness  Respiratory: Negative for cough and shortness of breath  Cardiovascular: Negative for chest pain and leg swelling  Gastrointestinal: Positive for abdominal pain  Negative for constipation, diarrhea, nausea and vomiting  Genitourinary: Negative for dysuria, frequency, hematuria and urgency  Musculoskeletal: Negative for back pain, myalgias and neck pain  Skin: Negative for color change and rash  Allergic/Immunologic: Negative for environmental allergies and immunocompromised state  Neurological: Positive for light-headedness and headaches  Negative for dizziness, tremors, seizures, syncope, facial asymmetry, speech difficulty, weakness and numbness  Hematological: Negative for adenopathy  Does not bruise/bleed easily  Psychiatric/Behavioral: Negative for agitation and confusion     All other systems reviewed and are negative  Physical Exam  Physical Exam  Vitals signs and nursing note reviewed  Constitutional:       Appearance: She is well-developed  HENT:      Head: Normocephalic and atraumatic  Nose: Nose normal    Eyes:      Extraocular Movements: Extraocular movements intact  Conjunctiva/sclera: Conjunctivae normal       Pupils: Pupils are equal, round, and reactive to light  Comments: EOMI, no nystagmus, no pain with eye movements, PERRL, no papilledema noted   Neck:      Musculoskeletal: Normal range of motion and neck supple  Comments: No neck ttp, full neck ROM, no meningeal signs  Cardiovascular:      Rate and Rhythm: Normal rate and regular rhythm  Heart sounds: Normal heart sounds  Pulmonary:      Effort: Pulmonary effort is normal  No respiratory distress  Breath sounds: Normal breath sounds  No stridor  No wheezing or rales  Chest:      Chest wall: No tenderness  Abdominal:      General: There is no distension  Palpations: Abdomen is soft  Tenderness: There is no abdominal tenderness  There is no guarding or rebound  Comments: No abdominal ttp   Musculoskeletal:         General: No swelling, tenderness or deformity  Comments: No calf swelling/ttp, no peripheral edema   Skin:     General: Skin is warm and dry  Findings: No rash  Neurological:      Mental Status: She is alert and oriented to person, place, and time  Motor: No abnormal muscle tone  Coordination: Coordination normal    Psychiatric:         Thought Content:  Thought content normal          Judgment: Judgment normal          Vital Signs  ED Triage Vitals [03/18/21 1628]   Temperature Pulse Respirations Blood Pressure SpO2   99 °F (37 2 °C) 104 18 127/59 98 %      Temp Source Heart Rate Source Patient Position - Orthostatic VS BP Location FiO2 (%)   Oral Monitor Sitting Right arm --      Pain Score       5           Vitals:    03/18/21 1628 03/18/21 4937 BP: 127/59 110/63   Pulse: 104 84   Patient Position - Orthostatic VS: Sitting Sitting         Visual Acuity      ED Medications  Medications   metoclopramide (REGLAN) injection 10 mg (10 mg Intravenous Given 3/18/21 1716)   acetaminophen (TYLENOL) tablet 975 mg (975 mg Oral Given 3/18/21 1713)   diphenhydrAMINE (BENADRYL) injection 25 mg (25 mg Intravenous Given 3/18/21 1715)   lactated ringers infusion 1,000 mL (0 mL Intravenous Stopped 3/18/21 1825)       Diagnostic Studies  Results Reviewed     Procedure Component Value Units Date/Time    Urine culture [[id number]] Collected: 03/18/21 1839    Lab Status: In process Specimen: Urine, Clean Catch Updated: 03/18/21 1847    Urine Macroscopic, POC [[id number]] Collected: 03/18/21 1839    Lab Status: Final result Specimen: Urine Updated: 03/18/21 1841     Color, UA Yellow     Clarity, UA Slightly Cloudy     pH, UA 6 0     Leukocytes, UA Negative     Nitrite, UA Negative     Protein, UA Negative mg/dl      Glucose, UA Negative mg/dl      Ketones, UA Negative mg/dl      Urobilinogen, UA 0 2 E U /dl      Bilirubin, UA Negative     Blood, UA Negative     Specific Gravity, UA 1 020    Narrative:      CLINITEK RESULT    COVID19, Influenza A/B, RSV PCR, [facility] [[id number]]  (Normal) Collected: 03/18/21 1739    Lab Status: Final result Specimen: Nares from Nose Updated: 03/18/21 1831     SARS-CoV-2 Negative     INFLUENZA A PCR Negative     INFLUENZA B PCR Negative     RSV PCR Negative    Narrative: This test has been authorized by FDA under an EUA (Emergency Use Assay) for use by authorized laboratories  Clinical caution and judgement should be used with the interpretation of these results with consideration of the clinical impression and other laboratory testing  Testing reported as "Positive" or "Negative" has been proven to be accurate according to standard laboratory validation requirements    All testing is performed with control materials showing appropriate reactivity at standard intervals  TSH, 3rd generation with Free T4 reflex [[id number]]  (Normal) Collected: 03/18/21 1713    Lab Status: Final result Specimen: Blood from Arm, Left Updated: 03/18/21 1754     TSH 3RD GENERATON 2 064 uIU/mL     Narrative:      Patients undergoing fluorescein dye angiography may retain small amounts of fluorescein in the body for 48-72 hours post procedure  Samples containing fluorescein can produce falsely depressed TSH values  If the patient had this procedure,a specimen should be resubmitted post fluorescein clearance        Comprehensive metabolic panel [[id number]]  (Abnormal) Collected: 03/18/21 1713    Lab Status: Final result Specimen: Blood from Arm, Left Updated: 03/18/21 1746     Sodium 135 mmol/L      Potassium 3 8 mmol/L      Chloride 103 mmol/L      CO2 24 mmol/L      ANION GAP 8 mmol/L      BUN 6 mg/dL      Creatinine 0 62 mg/dL      Glucose 85 mg/dL      Calcium 9 2 mg/dL      Corrected Calcium 10 2 mg/dL      AST 7 U/L      ALT 16 U/L      Alkaline Phosphatase 94 U/L      Total Protein 7 0 g/dL      Albumin 2 8 g/dL      Total Bilirubin 0 11 mg/dL      eGFR 128 ml/min/1 73sq m     Narrative:      [place] guidelines for Chronic Kidney Disease (CKD):     Stage 1 with normal or high GFR (GFR > 90 mL/min/1 73 square meters)    Stage 2 Mild CKD (GFR = 60-89 mL/min/1 73 square meters)    Stage 3A Moderate CKD (GFR = 45-59 mL/min/1 73 square meters)    Stage 3B Moderate CKD (GFR = 30-44 mL/min/1 73 square meters)    Stage 4 Severe CKD (GFR = 15-29 mL/min/1 73 square meters)    Stage 5 End Stage CKD (GFR <15 mL/min/1 73 square meters)  Note: GFR calculation is accurate only with a steady state creatinine    CBC and differential [[id number]]  (Abnormal) Collected: 03/18/21 1713    Lab Status: Final result Specimen: Blood from Arm, Left Updated: 03/18/21 1730     WBC 13 58 Thousand/uL      RBC 4 17 Million/uL      Hemoglobin 11 9 g/dL Hematocrit 36 2 %      MCV 87 fL      MCH 28 5 pg      MCHC 32 9 g/dL      RDW 14 4 %      MPV 12 3 fL      Platelets 463 Thousands/uL      nRBC 0 /100 WBCs      Neutrophils Relative 73 %      Immat GRANS % 1 %      Lymphocytes Relative 19 %      Monocytes Relative 6 %      Eosinophils Relative 1 %      Basophils Relative 0 %      Neutrophils Absolute 9 91 Thousands/µL      Immature Grans Absolute 0 09 Thousand/uL      Lymphocytes Absolute 2 60 Thousands/µL      Monocytes Absolute 0 84 Thousand/µL      Eosinophils Absolute 0 08 Thousand/µL      Basophils Absolute 0 06 Thousands/µL                  No orders to display              Procedures  Procedures         ED Course  ED Course as of Mar 18 1850   Thu Mar 18, 2021   1741 Fetal heart tones 146 bpm        1742 Pt states she is feeling better already and requesting IV out, will wait for CMP to result and remove if WNL      1830 Discussed with OB, Brian Montgomery, no need for OB eval  Reviewed presentation and results and ok for DC with outpt f/u      1835 SARS-COV-2: Negative   1847 EKG: NSR @ 88 bpm, normal intervals, no ST/T wave changes                                SBIRT 22yo+      Most Recent Value   SBIRT (25 yo +)   In order to provide better care to our patients, we are screening all of our patients for alcohol and drug use  Would it be okay to ask you these screening questions? Unable to answer at this time Filed at: 03/18/2021 1723                    MDM  Number of Diagnoses or Management Options  15 weeks gestation of pregnancy:   Headache:   Diagnosis management comments: 22 yo F 15 weeks pregnant presenting with HA, resolved in ED  Labs, urine, COVID unremarkable    Sx resolved, pt stable   Discussed with OB, pt discharged for outpt OB f/u and return precautions       Amount and/or Complexity of Data Reviewed  Clinical lab tests: ordered and reviewed  Discuss the patient with other providers: yes (OB)        Disposition  Final diagnoses: Headache   15 weeks gestation of pregnancy     Time reflects when diagnosis was documented in both MDM as applicable and the Disposition within this note     Time User Action Codes Description Comment    3/18/2021  6:31 PM Francee Pelt Add [R51 9] Headache     3/18/2021  6:32 PM Francee Pelt Add [Z3A 15] 15 weeks gestation of pregnancy       ED Disposition     ED Disposition Condition Date/Time Comment    Discharge Stable Thu Mar 18, 2021  6:31 PM Trflorentino Richmondems discharge to home/self care  Follow-up Information     Follow up With Specialties Details Why [address], [place] [facility], Physician Assistant   [address] [facility] [address]  [phone]      Yun Castro MD Obstetrics and Gynecology   [address]  [phone]            Discharge Medication List as of 3/18/2021  6:37 PM      CONTINUE these medications which have NOT CHANGED    Details   aspirin 81 mg chewable tablet Chew 2 tablets (162 mg total) daily, Starting Mon 2/22/2021, Until Sun 5/23/2021, Normal      Prenatal MV-Min-Fe Fum-FA-DHA (PRENATAL+DHA PO) Take by mouth, Historical Med           No discharge procedures on file      PDMP Review     None          ED Provider  Electronically Signed by           Ivan Srivastava DO  03/18/21 7236 JOVAN Rashid,   03/18/21 1836

## 2021-03-18 NOTE — ED NOTES
OB was called and per charge Kavin Dubin pt will be seen in the ED for evaluation       Francisco Flores RN  03/18/21 9640

## 2021-03-18 NOTE — TELEPHONE ENCOUNTER
Pt 15 and 6, G1  Pt just got out of shower - dizzy, disoriented, - "feels out of it"  Has had HA for 2 days  Does not usually get HA - said she was sob since yesterday  Sha ate at 2pm and was fine  Is hydrating  Does not feel feverish but no thermometer   No recent covid shot  Only sx is ha, dizzy disoriented, sob  Is alone at home - does not sound upset  About this     Donnis Beech  [birth year]

## 2021-03-18 NOTE — DISCHARGE INSTRUCTIONS
Take Tylenol 650-1000 mg every 8 hours as needed  Continue to stay well hydrated    Follow up with your OB  Return to the ER if you have any new or worsening symptoms including but not limited to intractable/severe headache, vision changes, lightheadedness/dizziness/passing out, numbness, weakness, abdominal pain, vaginal bleeding/loss of fluid/etc

## 2021-03-19 LAB — BACTERIA UR CULT: NORMAL

## 2021-04-01 ENCOUNTER — ROUTINE PRENATAL (OUTPATIENT)
Dept: OBGYN CLINIC | Facility: CLINIC | Age: 26
End: 2021-04-01

## 2021-04-01 VITALS — BODY MASS INDEX: 35.38 KG/M2 | SYSTOLIC BLOOD PRESSURE: 108 MMHG | WEIGHT: 219.2 LBS | DIASTOLIC BLOOD PRESSURE: 64 MMHG

## 2021-04-01 DIAGNOSIS — IMO0002: ICD-10-CM

## 2021-04-01 DIAGNOSIS — Z36.9 PRENATAL SCREENING ENCOUNTER: ICD-10-CM

## 2021-04-01 DIAGNOSIS — Z34.02 ENCOUNTER FOR SUPERVISION OF NORMAL FIRST PREGNANCY IN SECOND TRIMESTER: Primary | ICD-10-CM

## 2021-04-01 LAB
SL AMB  POCT GLUCOSE, UA: ABNORMAL
SL AMB POCT URINE PROTEIN: ABNORMAL

## 2021-04-01 PROCEDURE — PNV: Performed by: OBSTETRICS & GYNECOLOGY

## 2021-04-01 NOTE — PROGRESS NOTES
Problem List Items Addressed This Visit        Other    Encounter for supervision of normal first pregnancy in second trimester - Primary     Alexis Fallon is doing well  PN panel not ordered fully - this was provided today and she will get done with AFP  She has NIPT kit at home and plans to complete  Level II US is scheduled  Feeling flutters  Some mild constipation - discussed increasing water intake, colace 1-2 times a day if needed            Relevant Orders    POCT urine dip (Completed)    Hepatitis B surface antigen    Human Immunodeficiency Virus 1/2 Antigen / Antibody ( Fourth Generation) with Reflex Testing    ABO/Rh    RPR    Antibody screen    Glucose, 1H PG      Other Visit Diagnoses     Prenatal screening encounter        Relevant Orders    Alpha fetoprotein, maternal    Pregnancy with fewer than 24 completed weeks gestation        Relevant Orders    Alpha fetoprotein, maternal

## 2021-04-01 NOTE — ASSESSMENT & PLAN NOTE
Thalia Salcido is doing well  PN panel not ordered fully - this was provided today and she will get done with AFP  She has NIPT kit at home and plans to complete  Level II US is scheduled  Feeling flutters  Some mild constipation - discussed increasing water intake, colace 1-2 times a day if needed

## 2021-04-01 NOTE — PROGRESS NOTES
Patient presents for a routine prenatal visit    17W6D  Starting to feel a lot of flutters! No LOF,bleeding, cramping or discharge  No current complaints at this time     Level II us scheduled     Urine- Trace - Protein / neg - Glucose

## 2021-04-21 ENCOUNTER — ROUTINE PRENATAL (OUTPATIENT)
Dept: PERINATAL CARE | Facility: OTHER | Age: 26
End: 2021-04-21
Payer: COMMERCIAL

## 2021-04-21 VITALS
HEART RATE: 106 BPM | HEIGHT: 66 IN | WEIGHT: 223.8 LBS | BODY MASS INDEX: 35.97 KG/M2 | DIASTOLIC BLOOD PRESSURE: 77 MMHG | SYSTOLIC BLOOD PRESSURE: 119 MMHG

## 2021-04-21 DIAGNOSIS — O99.210 OBESITY AFFECTING PREGNANCY, ANTEPARTUM: Primary | ICD-10-CM

## 2021-04-21 DIAGNOSIS — Z3A.20 20 WEEKS GESTATION OF PREGNANCY: ICD-10-CM

## 2021-04-21 DIAGNOSIS — Z36.86 ENCOUNTER FOR ANTENATAL SCREENING FOR CERVICAL LENGTH: ICD-10-CM

## 2021-04-21 PROCEDURE — 76817 TRANSVAGINAL US OBSTETRIC: CPT | Performed by: OBSTETRICS & GYNECOLOGY

## 2021-04-21 PROCEDURE — 76811 OB US DETAILED SNGL FETUS: CPT | Performed by: OBSTETRICS & GYNECOLOGY

## 2021-04-21 PROCEDURE — 3008F BODY MASS INDEX DOCD: CPT | Performed by: OBSTETRICS & GYNECOLOGY

## 2021-04-21 PROCEDURE — 99213 OFFICE O/P EST LOW 20 MIN: CPT | Performed by: OBSTETRICS & GYNECOLOGY

## 2021-04-29 ENCOUNTER — ROUTINE PRENATAL (OUTPATIENT)
Dept: OBGYN CLINIC | Facility: CLINIC | Age: 26
End: 2021-04-29

## 2021-04-29 VITALS — BODY MASS INDEX: 35.86 KG/M2 | SYSTOLIC BLOOD PRESSURE: 118 MMHG | DIASTOLIC BLOOD PRESSURE: 72 MMHG | WEIGHT: 222.2 LBS

## 2021-04-29 DIAGNOSIS — Z34.02 ENCOUNTER FOR SUPERVISION OF NORMAL FIRST PREGNANCY IN SECOND TRIMESTER: Primary | ICD-10-CM

## 2021-04-29 LAB
SL AMB  POCT GLUCOSE, UA: ABNORMAL
SL AMB POCT URINE PROTEIN: ABNORMAL

## 2021-04-29 PROCEDURE — PNV: Performed by: OBSTETRICS & GYNECOLOGY

## 2021-04-29 NOTE — PROGRESS NOTES
Alexis Fallon is doing well  It's a girl!    32wk growth scheduled  Has not yet finished PN panel/AFP/etc - plans to do ASAP

## 2021-04-29 NOTE — PROGRESS NOTES
Patient presents for a routine prenatal visit  21W6D  Good Fetal Movement  No LOF, VB or CTX  Found out baby is a Girl!!     No current complaints at this time

## 2021-05-06 ENCOUNTER — OFFICE VISIT (OUTPATIENT)
Dept: FAMILY MEDICINE CLINIC | Facility: CLINIC | Age: 26
End: 2021-05-06
Payer: COMMERCIAL

## 2021-05-06 VITALS
WEIGHT: 226.8 LBS | OXYGEN SATURATION: 98 % | RESPIRATION RATE: 20 BRPM | BODY MASS INDEX: 36.45 KG/M2 | TEMPERATURE: 97.6 F | SYSTOLIC BLOOD PRESSURE: 126 MMHG | HEART RATE: 89 BPM | HEIGHT: 66 IN | DIASTOLIC BLOOD PRESSURE: 72 MMHG

## 2021-05-06 DIAGNOSIS — F32.A DEPRESSIVE DISORDER: Primary | ICD-10-CM

## 2021-05-06 DIAGNOSIS — Z3A.23 23 WEEKS GESTATION OF PREGNANCY: ICD-10-CM

## 2021-05-06 PROCEDURE — 99214 OFFICE O/P EST MOD 30 MIN: CPT | Performed by: PHYSICIAN ASSISTANT

## 2021-05-06 PROCEDURE — 3725F SCREEN DEPRESSION PERFORMED: CPT | Performed by: PHYSICIAN ASSISTANT

## 2021-05-06 RX ORDER — SERTRALINE HYDROCHLORIDE 25 MG/1
25 TABLET, FILM COATED ORAL DAILY
Qty: 30 TABLET | Refills: 5 | Status: SHIPPED | OUTPATIENT
Start: 2021-05-06 | End: 2021-05-20 | Stop reason: SDUPTHER

## 2021-05-06 NOTE — PATIENT INSTRUCTIONS
Problem List Items Addressed This Visit        Other    Depressive disorder - Primary    23 weeks gestation of pregnancy

## 2021-05-06 NOTE — ASSESSMENT & PLAN NOTE
Pt is 23 weeks pregnant and between high hormone levels and specific stressful situations (mainly her current living arrangement) she is in need of treatment with an SSRI due to SI without plan  Minimal risk of treatment does outweight risk of no treatment at this time  I have ordered behavioral health consult and will start pt on Zoloft 25 mg once daily and see her back in 2 weeks  She is to make her OB aware of the addition of this medication  She contracts for safety at this time  Sh can call me anytime as well  Pt has requested off of work since 4/28 and eventually there will be paperwork to complete

## 2021-05-06 NOTE — PROGRESS NOTES
Assessment and Plan:    Problem List Items Addressed This Visit        Other    Depressive disorder - Primary     Pt is 23 weeks pregnant and between high hormone levels and specific stressful situations (mainly her current living arrangement) she is in need of treatment with an SSRI due to SI without plan  Minimal risk of treatment does outweight risk of no treatment at this time  I have ordered behavioral health consult and will start pt on Zoloft 25 mg once daily and see her back in 2 weeks  She is to make her OB aware of the addition of this medication  She contracts for safety at this time  Sh can call me anytime as well  Pt has requested off of work since 4/28 and eventually there will be paperwork to complete  Relevant Medications    sertraline (ZOLOFT) 25 mg tablet    Other Relevant Orders    Ambulatory referral to Romina Fernandez    23 weeks gestation of pregnancy    Relevant Orders    Ambulatory referral to Romina Fernandez                 Diagnoses and all orders for this visit:    Depressive disorder  -     Ambulatory referral to Romina Fernandez; Future  -     sertraline (ZOLOFT) 25 mg tablet; Take 1 tablet (25 mg total) by mouth daily    23 weeks gestation of pregnancy  -     Ambulatory referral to Romina Fernandez; Future              Subjective:      Patient ID: Tanner Church is a 27 y o  female  CC:    Chief Complaint   Patient presents with    Follow-up    Anxiety    Depression       HPI:    Patient here today to discuss possible options for treatment of depression  Patient states that throughout her childhood she had some mild depressive type episodes but nothing that ever led to needing medication  She states that last year she became unexpectedly pregnant with her fiance and so they embraced the pregnancy and made a move up to sleeping in and unfortunately the past month she has been feeling very overwhelmed with everything    It ends up that they above neighbors of their apartment were very loud and obnoxious in the had a brown leak into the nursery so they decided to pack a bad leave there and they are currently living with her dee's parents and grandmother all in 1 house  She states that this is very tight quarters and they are looking for a place to moved to on their own if they can get out of the lease of their apartment  At this point time she has been sleeping too much feeling very tired decrease in motivation and hope fullness  She has had some suicidal ideations without plan it when she states that she would never act on them because she is pregnant  She just found out she was having a girl and she is going to [employer]  She works from home and on the 20 8th of April ask for leave of absence because of her depressive symptoms  Pregnancy in general has been going very well she that 23 weeks so far and she states that she is starting to feel a lot of movement and is overall very exciting for her  The following portions of the patient's history were reviewed and updated as appropriate: allergies, current medications, past family history, past medical history, past social history, past surgical history and problem list       Review of Systems   Constitutional: Negative  HENT: Negative  Eyes: Negative  Respiratory: Negative  Cardiovascular: Negative  Gastrointestinal: Negative  Endocrine: Negative  Genitourinary: Negative  Musculoskeletal: Negative  Skin: Negative  Allergic/Immunologic: Negative  Neurological: Negative  Hematological: Negative  Psychiatric/Behavioral: Positive for decreased concentration, dysphoric mood, sleep disturbance and suicidal ideas  The patient is nervous/anxious            Data to review:       Objective:    Vitals:    05/06/21 1203   BP: 126/72   BP Location: Left arm   Patient Position: Sitting   Cuff Size: Large   Pulse: 89   Resp: 20   Temp: 97 6 °F (36 4 °C)   TempSrc: Tympanic SpO2: 98%   Weight: 103 kg (226 lb 12 8 oz)   Height: 5' 6" (1 676 m)        Physical Exam  Vitals signs and nursing note reviewed  Constitutional:       Appearance: Normal appearance  She is well-developed  HENT:      Head: Normocephalic and atraumatic  Eyes:      General: Lids are normal       Conjunctiva/sclera: Conjunctivae normal       Pupils: Pupils are equal, round, and reactive to light  Cardiovascular:      Rate and Rhythm: Normal rate and regular rhythm  Heart sounds: No murmur  Pulmonary:      Effort: Pulmonary effort is normal       Breath sounds: Normal breath sounds  Skin:     General: Skin is warm and dry  Neurological:      General: No focal deficit present  Mental Status: She is alert  Coordination: Coordination is intact  Psychiatric:         Mood and Affect: Mood is depressed  Affect is tearful  Behavior: Behavior normal  Behavior is cooperative  Thought Content:  Thought content normal          Judgment: Judgment normal

## 2021-05-08 ENCOUNTER — APPOINTMENT (OUTPATIENT)
Dept: LAB | Facility: MEDICAL CENTER | Age: 26
End: 2021-05-08
Payer: COMMERCIAL

## 2021-05-08 ENCOUNTER — TRANSCRIBE ORDERS (OUTPATIENT)
Dept: ADMINISTRATIVE | Facility: HOSPITAL | Age: 26
End: 2021-05-08

## 2021-05-08 DIAGNOSIS — Z36.9 PRENATAL SCREENING ENCOUNTER: ICD-10-CM

## 2021-05-08 DIAGNOSIS — Z33.1 PREGNANT STATE, INCIDENTAL: ICD-10-CM

## 2021-05-08 DIAGNOSIS — Z33.1 PREGNANT STATE, INCIDENTAL: Primary | ICD-10-CM

## 2021-05-08 DIAGNOSIS — IMO0002: ICD-10-CM

## 2021-05-08 DIAGNOSIS — Z34.02 ENCOUNTER FOR SUPERVISION OF NORMAL FIRST PREGNANCY IN SECOND TRIMESTER: ICD-10-CM

## 2021-05-08 LAB
ABO GROUP BLD: NORMAL
BLD GP AB SCN SERPL QL: NEGATIVE
GLUCOSE 1H P 50 G GLC PO SERPL-MCNC: 108 MG/DL (ref 40–134)
HBV SURFACE AG SER QL: NORMAL
RH BLD: POSITIVE
RUBV IGG SERPL IA-ACNC: 158 IU/ML

## 2021-05-08 PROCEDURE — 36415 COLL VENOUS BLD VENIPUNCTURE: CPT

## 2021-05-08 PROCEDURE — 86762 RUBELLA ANTIBODY: CPT

## 2021-05-08 PROCEDURE — 82950 GLUCOSE TEST: CPT

## 2021-05-08 PROCEDURE — 86901 BLOOD TYPING SEROLOGIC RH(D): CPT

## 2021-05-08 PROCEDURE — 82105 ALPHA-FETOPROTEIN SERUM: CPT

## 2021-05-08 PROCEDURE — 87389 HIV-1 AG W/HIV-1&-2 AB AG IA: CPT

## 2021-05-08 PROCEDURE — 87340 HEPATITIS B SURFACE AG IA: CPT

## 2021-05-08 PROCEDURE — 86900 BLOOD TYPING SEROLOGIC ABO: CPT

## 2021-05-08 PROCEDURE — 86850 RBC ANTIBODY SCREEN: CPT

## 2021-05-08 PROCEDURE — 86592 SYPHILIS TEST NON-TREP QUAL: CPT

## 2021-05-10 LAB
HIV 1+2 AB+HIV1 P24 AG SERPL QL IA: NORMAL
RPR SER QL: NORMAL

## 2021-05-11 LAB
2ND TRIMESTER 4 SCREEN SERPL-IMP: NORMAL
AFP ADJ MOM SERPL: 0.6
AFP INTERP AMN-IMP: NORMAL
AFP INTERP SERPL-IMP: NORMAL
AFP INTERP SERPL-IMP: NORMAL
AFP SERPL-MCNC: 41.9 NG/ML
AGE AT DELIVERY: 24.4 YR
GA METHOD: NORMAL
GA: 23.1 WEEKS
IDDM PATIENT QL: NO
MULTIPLE PREGNANCY: NO
NEURAL TUBE DEFECT RISK FETUS: NORMAL %

## 2021-05-18 ENCOUNTER — TELEPHONE (OUTPATIENT)
Dept: PERINATAL CARE | Facility: CLINIC | Age: 26
End: 2021-05-18

## 2021-05-18 NOTE — TELEPHONE ENCOUNTER
I spoke to [facility] and gave her the normal result of her MaterniT 21  She verbalized understanding and denies any questions

## 2021-05-18 NOTE — TELEPHONE ENCOUNTER
----- Message from Shea Barraza MD sent at 5/17/2021 10:27 AM EDT -----  I reviewed the lab study today and the results revealed low risks for trisomy 21, 25, 15, and sex chromosome aneuploidies

## 2021-05-20 ENCOUNTER — OFFICE VISIT (OUTPATIENT)
Dept: FAMILY MEDICINE CLINIC | Facility: CLINIC | Age: 26
End: 2021-05-20
Payer: COMMERCIAL

## 2021-05-20 VITALS
HEART RATE: 112 BPM | DIASTOLIC BLOOD PRESSURE: 60 MMHG | HEIGHT: 66 IN | SYSTOLIC BLOOD PRESSURE: 104 MMHG | BODY MASS INDEX: 36.32 KG/M2 | WEIGHT: 226 LBS

## 2021-05-20 DIAGNOSIS — F32.A ANXIETY AND DEPRESSION: Primary | ICD-10-CM

## 2021-05-20 DIAGNOSIS — F41.9 ANXIETY AND DEPRESSION: Primary | ICD-10-CM

## 2021-05-20 DIAGNOSIS — F32.A DEPRESSIVE DISORDER: ICD-10-CM

## 2021-05-20 PROBLEM — Z3A.25 25 WEEKS GESTATION OF PREGNANCY: Status: ACTIVE | Noted: 2021-04-21

## 2021-05-20 PROCEDURE — 99214 OFFICE O/P EST MOD 30 MIN: CPT | Performed by: PHYSICIAN ASSISTANT

## 2021-05-20 NOTE — PROGRESS NOTES
Assessment and Plan:    Problem List Items Addressed This Visit        Other    Anxiety and depression - Primary     Not at goal so will increase zoloft to 50 mg once daily  Pt  is to take 1 5 of 25 for the first week  Mild SI remain w/o plan  Check in 2 weeks  Behavior health never called pt so she will call them this week  Relevant Medications    sertraline (ZOLOFT) 50 mg tablet      Other Visit Diagnoses     Depressive disorder        Relevant Medications    sertraline (ZOLOFT) 50 mg tablet                 Diagnoses and all orders for this visit:    Anxiety and depression    Depressive disorder  -     sertraline (ZOLOFT) 50 mg tablet; Take 1 tablet (50 mg total) by mouth daily              Subjective:      Patient ID: Higinio Vee is a 27 y o  female  CC:    Chief Complaint   Patient presents with    Follow-up     2 week follow up to depression  mjs       HPI:      Patient here today for a recheck on initiation of Zoloft for anxiety and depression during pregnancy  She started 25 mg once daily 2 weeks ago and patient states that she is not noticing any difference  She is still having some suicidal ideations although they may not be as prominent without plan and she does contract for safety  Unfortunately her situation has gotten worse because as they are living with her boyfriend's parents unfortunately her dog that she has had for 2 years attacked his parents dog and because of the fact that the baby is coming and she is 25 weeks pregnant they decided to give the dog away because of the risk of him attacking their child and they are very sad about this because they did left dog  She continues to not be able to work because of decreasing concentration 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  We did fill a paperwork and have her out until the 20th of June but hope to get her back to work sooner    They are still unable to find an apartment  She is having trouble sleeping and having anxiety moments overwhelming moments and depressive moments altogether  The following portions of the patient's history were reviewed and updated as appropriate: allergies, current medications, past family history, past medical history, past social history, past surgical history and problem list       Review of Systems   Constitutional: Negative  HENT: Negative  Eyes: Negative  Respiratory: Negative  Cardiovascular: Negative  Gastrointestinal: Negative  Endocrine: Negative  Genitourinary: Negative  Musculoskeletal: Negative  Skin: Negative  Allergic/Immunologic: Negative  Neurological: Negative  Hematological: Negative  Psychiatric/Behavioral: Positive for decreased concentration, dysphoric mood, sleep disturbance and suicidal ideas  The patient is nervous/anxious  Data to review:       Objective:    Vitals:    05/20/21 1105   BP: 104/60   Pulse: (!) 112   Weight: 103 kg (226 lb)   Height: 5' 6" (1 676 m)        Physical Exam  Vitals signs and nursing note reviewed  Constitutional:       Appearance: Normal appearance  She is well-developed  HENT:      Head: Normocephalic and atraumatic  Eyes:      General: Lids are normal       Conjunctiva/sclera: Conjunctivae normal       Pupils: Pupils are equal, round, and reactive to light  Cardiovascular:      Rate and Rhythm: Normal rate and regular rhythm  Heart sounds: No murmur  Pulmonary:      Effort: Pulmonary effort is normal       Breath sounds: Normal breath sounds  Skin:     General: Skin is warm and dry  Neurological:      General: No focal deficit present  Mental Status: She is alert  Coordination: Coordination is intact  Psychiatric:         Mood and Affect: Mood normal          Behavior: Behavior normal  Behavior is cooperative  Thought Content:  Thought content normal          Judgment: Judgment normal

## 2021-05-20 NOTE — PATIENT INSTRUCTIONS
Problem List Items Addressed This Visit        Other    Anxiety and depression - Primary     Not at goal so will increase zoloft to 50 mg once daily  Pt  is to take 1 5 of 25 for the first week  Mild SI remain w/o plan  Check in 2 weeks           Relevant Medications    sertraline (ZOLOFT) 50 mg tablet      Other Visit Diagnoses     Depressive disorder        Relevant Medications    sertraline (ZOLOFT) 50 mg tablet

## 2021-05-20 NOTE — ASSESSMENT & PLAN NOTE
Not at goal so will increase zoloft to 50 mg once daily  Pt  is to take 1 5 of 25 for the first week  Mild SI remain w/o plan  Check in 2 weeks  Behavior health never called pt so she will call them this week

## 2021-05-25 ENCOUNTER — TELEPHONE (OUTPATIENT)
Dept: PERINATAL CARE | Facility: OTHER | Age: 26
End: 2021-05-25

## 2021-05-25 ENCOUNTER — ROUTINE PRENATAL (OUTPATIENT)
Dept: OBGYN CLINIC | Facility: CLINIC | Age: 26
End: 2021-05-25

## 2021-05-25 VITALS — WEIGHT: 226.6 LBS | SYSTOLIC BLOOD PRESSURE: 124 MMHG | BODY MASS INDEX: 36.57 KG/M2 | DIASTOLIC BLOOD PRESSURE: 74 MMHG

## 2021-05-25 DIAGNOSIS — Z34.03 ENCOUNTER FOR SUPERVISION OF NORMAL FIRST PREGNANCY IN THIRD TRIMESTER: Primary | ICD-10-CM

## 2021-05-25 DIAGNOSIS — Z11.3 SCREENING FOR STD (SEXUALLY TRANSMITTED DISEASE): ICD-10-CM

## 2021-05-25 LAB
SL AMB  POCT GLUCOSE, UA: 100
SL AMB POCT URINE PROTEIN: 1

## 2021-05-25 PROCEDURE — PNV: Performed by: OBSTETRICS & GYNECOLOGY

## 2021-05-25 NOTE — TELEPHONE ENCOUNTER
Spoke with patient and confirmed her MFM appointment had to be rescheduled  Patient verbalized understanding of new time, date and location of appointment - 7/1/21  2:30  [facility]  Patient denies further questions

## 2021-05-25 NOTE — PROGRESS NOTES
PN1 Labs just completed  Early 1hr GTT= 108  Patient declines getting Covid Vaccine  Level 2 done  Denies LOF,VB, CTX    GFM! Just ate Ice cream prior to visit  28wk labs given  Stressed importance of going for these

## 2021-05-28 LAB — MISCELLANEOUS LAB TEST RESULT: NORMAL

## 2021-06-03 ENCOUNTER — ROUTINE PRENATAL (OUTPATIENT)
Dept: OBGYN CLINIC | Facility: CLINIC | Age: 26
End: 2021-06-03

## 2021-06-03 ENCOUNTER — OFFICE VISIT (OUTPATIENT)
Dept: FAMILY MEDICINE CLINIC | Facility: CLINIC | Age: 26
End: 2021-06-03
Payer: COMMERCIAL

## 2021-06-03 VITALS
DIASTOLIC BLOOD PRESSURE: 68 MMHG | WEIGHT: 226.4 LBS | BODY MASS INDEX: 36.38 KG/M2 | RESPIRATION RATE: 18 BRPM | TEMPERATURE: 98 F | SYSTOLIC BLOOD PRESSURE: 102 MMHG | HEART RATE: 109 BPM | HEIGHT: 66 IN

## 2021-06-03 VITALS — DIASTOLIC BLOOD PRESSURE: 60 MMHG | SYSTOLIC BLOOD PRESSURE: 105 MMHG | WEIGHT: 226.6 LBS | BODY MASS INDEX: 36.57 KG/M2

## 2021-06-03 DIAGNOSIS — F41.9 ANXIETY AND DEPRESSION: ICD-10-CM

## 2021-06-03 DIAGNOSIS — Z34.02 ENCOUNTER FOR SUPERVISION OF NORMAL FIRST PREGNANCY IN SECOND TRIMESTER: Primary | ICD-10-CM

## 2021-06-03 DIAGNOSIS — F32.A ANXIETY AND DEPRESSION: ICD-10-CM

## 2021-06-03 DIAGNOSIS — Z3A.26 26 WEEKS GESTATION OF PREGNANCY: Primary | ICD-10-CM

## 2021-06-03 LAB
SL AMB  POCT GLUCOSE, UA: ABNORMAL
SL AMB POCT URINE PROTEIN: ABNORMAL

## 2021-06-03 PROCEDURE — PNV: Performed by: OBSTETRICS & GYNECOLOGY

## 2021-06-03 PROCEDURE — 1036F TOBACCO NON-USER: CPT | Performed by: PHYSICIAN ASSISTANT

## 2021-06-03 PROCEDURE — 99213 OFFICE O/P EST LOW 20 MIN: CPT | Performed by: PHYSICIAN ASSISTANT

## 2021-06-03 NOTE — PROGRESS NOTES
Patient presents for a routine prenatal visit  26W6D  GFM   No LOF, VB or CTX  No current complaints at this time  Patient hasn't gone for 28wk LABS yet     Yellow Folder given as well as L&D consent signed

## 2021-06-03 NOTE — PROGRESS NOTES
Alexis Fallon is overall doing well  Following with PCP for anxiety/depression - on zoloft, discussed safety during pregnancy and breastfeeding  Has 28wk lab slip  32wk growth US is scheduled  No weight gain, will monitor, has growth scheduled  Delivery consent signed

## 2021-06-03 NOTE — PROGRESS NOTES
Assessment and Plan:    Problem List Items Addressed This Visit        Other    Anxiety and depression     Pt only increased zoloft to 1 5 instead of 2 full to make 50 mg so she will increase to 50 mg at this time  No HI or SI  She has not yet heard from [facility] which was placed 1 month ago and therefore I did staff message Deb Carson  She continues out of work  I will see her in 2 weeks, sooner if needed  26 weeks gestation of pregnancy - Primary     Doing well overall  OB apt today  Diagnoses and all orders for this visit:    26 weeks gestation of pregnancy    Anxiety and depression              Subjective:      Patient ID: Bernie Oleary is a 27 y o  female  CC:    Chief Complaint   Patient presents with    Follow-up       HPI:    Patient here today to review anxiety depression  Last visit we increased her Zoloft to 50 mg and she was supposed to take 1/2 for the 1st week of the 25 but she   For got increase and continued on just the 1 5  Daily  She states that she still struggles every couple days with being overwhelmed and stressed and depressed and has a lot of ongoing anxiety but there is a light at the end of the tunnel and they are moving to a new apartment and are just waiting to sign the contract  There to put money down  Her pregnancy is going well she states that from this standpoint she feels very weak good and that her daughter is moving a lot  Her next OB appointment is today  Still finding it very hard to be able to focus and concentrate and she does believe she still needs to be out of work for the next 2 weeks and is hoping to be able to return the 21st of June  Unfortunately behavioral health never contacted her even the order was placed a month ago  No SI or HI        The following portions of the patient's history were reviewed and updated as appropriate: allergies, current medications, past family history, past medical history, past social history, past surgical history and problem list       Review of Systems   Constitutional: Negative  Negative for chills and fever  HENT: Negative  Negative for ear pain and sore throat  Eyes: Negative  Negative for pain and visual disturbance  Respiratory: Negative  Negative for cough and shortness of breath  Cardiovascular: Negative  Negative for chest pain and palpitations  Gastrointestinal: Negative  Negative for abdominal pain and vomiting  Endocrine: Negative  Genitourinary: Negative  Negative for dysuria and hematuria  Musculoskeletal: Negative  Negative for arthralgias and back pain  Skin: Negative  Negative for color change and rash  Allergic/Immunologic: Negative  Neurological: Negative  Negative for seizures and syncope  Hematological: Negative  Psychiatric/Behavioral: Negative  All other systems reviewed and are negative  Data to review:       Objective:    Vitals:    06/03/21 0755   BP: 102/68   BP Location: Left arm   Patient Position: Sitting   Cuff Size: Adult   Pulse: (!) 109   Resp: 18   Temp: 98 °F (36 7 °C)   TempSrc: Tympanic   Weight: 103 kg (226 lb 6 4 oz)   Height: 5' 6" (1 676 m)        Physical Exam  Vitals signs and nursing note reviewed  Constitutional:       Appearance: Normal appearance  She is well-developed  HENT:      Head: Normocephalic and atraumatic  Eyes:      General: Lids are normal       Conjunctiva/sclera: Conjunctivae normal       Pupils: Pupils are equal, round, and reactive to light  Cardiovascular:      Rate and Rhythm: Normal rate and regular rhythm  Heart sounds: No murmur  Pulmonary:      Effort: Pulmonary effort is normal       Breath sounds: Normal breath sounds  Skin:     General: Skin is warm and dry  Neurological:      General: No focal deficit present  Mental Status: She is alert  Coordination: Coordination is intact     Psychiatric:         Mood and Affect: Mood normal  Behavior: Behavior normal  Behavior is cooperative  Thought Content:  Thought content normal          Judgment: Judgment normal

## 2021-06-03 NOTE — PATIENT INSTRUCTIONS
Problem List Items Addressed This Visit        Other    Anxiety and depression     Pt only increased zoloft to 1 5 instead of 2 full to make 50 mg so she will increase to 50 mg at this time  No HI or SI  She has not yet heard from [facility] which was placed 1 month ago and therefore I did staff message Iwona Arriaza  She continues out of work  I will see her in 2 weeks, sooner if needed  26 weeks gestation of pregnancy - Primary     Doing well overall  OB apt today

## 2021-06-03 NOTE — ASSESSMENT & PLAN NOTE
Pt only increased zoloft to 1 5 instead of 2 full to make 50 mg so she will increase to 50 mg at this time  No HI or SI  She has not yet heard from [facility] which was placed 1 month ago and therefore I did staff message Allen Cerda  She continues out of work  I will see her in 2 weeks, sooner if needed

## 2021-06-04 ENCOUNTER — TELEPHONE (OUTPATIENT)
Dept: PSYCHIATRY | Facility: CLINIC | Age: 26
End: 2021-06-04

## 2021-06-04 NOTE — TELEPHONE ENCOUNTER
Behavorial Health Outpatient Intake Questions    Referred by: PCP    Please advised interviewee that they need to answer all questions truthfully to allow for best care and any misrepresentations of information may affect their ability to be seen at this clinic   => Was this discussed? Yes     [facility] -     Presenting Problem (in patient's words): Anxiety and depression  Family Hx of Bipolar  Are there any developmental disabilities? ? If yes, can they speak to you on the phone? If they are too limited to speak to you on phone, refer out No    Are you taking any psychiatric medications? Yes    => If yes, who prescribes? If yes, are they injectable medications?   sertraline (ZOLOFT) 50 mg tablet       Does the patient have a language barrier or hearing impairment? No    Have you been treated at [facility] by a therapist or a doctor in the past? If yes, who? No    Has the patient been hospitalized for mental health? No   If yes, how long ago was last hospitalization and where was it? Do you actively use alcohol or marijuana or illegal substances? If yes, what and how much - refer out to Drug and alcohol treatment if use is excessive or daily use of illegal substances No concerns of substance abuse are reported  Do you have a community treatment team or ? No    Legal History-     Does the patient have any history of arrests, penitentiary/group home time, or DUIs? No  If Yes-  1) What types of charges? 2) When were they last incarcerated? 3) Are they currently on parole or probation? Minor Child-    Who has custody of the child? Is there a custody agreement? If there is a custody agreement remind parent that they must bring a copy to the first appt or they will not be seen       Intake Team, please check with provider before scheduling if flags come up such as:  - complex case  - legal history (other than DUI)  - communication barrier concerns are present  - if, in your judgment, this needs further review    ACCEPTED as a patient Yes  => Appointment Date: 07/08/2021 at 10:00 a m with Xiomy Mao     Referred Elsewhere? No    Name of Insurance Co: [employer] ID# [id number]  Insurance Phone #  If ins is primary or secondary  If patient is a minor, parents information such as Name, D  O B of guarantor

## 2021-06-17 ENCOUNTER — APPOINTMENT (OUTPATIENT)
Dept: LAB | Facility: CLINIC | Age: 26
End: 2021-06-17
Payer: COMMERCIAL

## 2021-06-17 ENCOUNTER — OFFICE VISIT (OUTPATIENT)
Dept: FAMILY MEDICINE CLINIC | Facility: CLINIC | Age: 26
End: 2021-06-17
Payer: COMMERCIAL

## 2021-06-17 ENCOUNTER — ROUTINE PRENATAL (OUTPATIENT)
Dept: OBGYN CLINIC | Facility: CLINIC | Age: 26
End: 2021-06-17

## 2021-06-17 VITALS
DIASTOLIC BLOOD PRESSURE: 74 MMHG | HEIGHT: 66 IN | HEART RATE: 102 BPM | WEIGHT: 230.4 LBS | BODY MASS INDEX: 37.03 KG/M2 | TEMPERATURE: 97.8 F | RESPIRATION RATE: 16 BRPM | SYSTOLIC BLOOD PRESSURE: 116 MMHG

## 2021-06-17 VITALS — DIASTOLIC BLOOD PRESSURE: 60 MMHG | WEIGHT: 230.8 LBS | SYSTOLIC BLOOD PRESSURE: 102 MMHG | BODY MASS INDEX: 37.25 KG/M2

## 2021-06-17 DIAGNOSIS — Z3A.28 28 WEEKS GESTATION OF PREGNANCY: ICD-10-CM

## 2021-06-17 DIAGNOSIS — F41.9 ANXIETY AND DEPRESSION: Primary | ICD-10-CM

## 2021-06-17 DIAGNOSIS — Z34.03 ENCOUNTER FOR SUPERVISION OF NORMAL FIRST PREGNANCY IN THIRD TRIMESTER: Primary | ICD-10-CM

## 2021-06-17 DIAGNOSIS — Z34.03 ENCOUNTER FOR SUPERVISION OF NORMAL FIRST PREGNANCY IN THIRD TRIMESTER: ICD-10-CM

## 2021-06-17 DIAGNOSIS — F32.A ANXIETY AND DEPRESSION: Primary | ICD-10-CM

## 2021-06-17 DIAGNOSIS — Z11.3 SCREENING FOR STD (SEXUALLY TRANSMITTED DISEASE): ICD-10-CM

## 2021-06-17 LAB
ERYTHROCYTE [DISTWIDTH] IN BLOOD BY AUTOMATED COUNT: 13.4 % (ref 11.6–15.1)
GLUCOSE 1H P 50 G GLC PO SERPL-MCNC: 118 MG/DL (ref 40–134)
HCT VFR BLD AUTO: 32 % (ref 34.8–46.1)
HGB BLD-MCNC: 10.2 G/DL (ref 11.5–15.4)
MCH RBC QN AUTO: 27.5 PG (ref 26.8–34.3)
MCHC RBC AUTO-ENTMCNC: 31.9 G/DL (ref 31.4–37.4)
MCV RBC AUTO: 86 FL (ref 82–98)
PLATELET # BLD AUTO: 221 THOUSANDS/UL (ref 149–390)
PMV BLD AUTO: 12.6 FL (ref 8.9–12.7)
RBC # BLD AUTO: 3.71 MILLION/UL (ref 3.81–5.12)
SL AMB  POCT GLUCOSE, UA: ABNORMAL
SL AMB POCT URINE PROTEIN: ABNORMAL
WBC # BLD AUTO: 13.12 THOUSAND/UL (ref 4.31–10.16)

## 2021-06-17 PROCEDURE — 99213 OFFICE O/P EST LOW 20 MIN: CPT | Performed by: PHYSICIAN ASSISTANT

## 2021-06-17 PROCEDURE — PNV: Performed by: OBSTETRICS & GYNECOLOGY

## 2021-06-17 PROCEDURE — 86592 SYPHILIS TEST NON-TREP QUAL: CPT

## 2021-06-17 PROCEDURE — 3008F BODY MASS INDEX DOCD: CPT | Performed by: PHYSICIAN ASSISTANT

## 2021-06-17 PROCEDURE — 36415 COLL VENOUS BLD VENIPUNCTURE: CPT

## 2021-06-17 PROCEDURE — 82950 GLUCOSE TEST: CPT

## 2021-06-17 PROCEDURE — 85027 COMPLETE CBC AUTOMATED: CPT

## 2021-06-17 NOTE — PROGRESS NOTES
Alycia Choi is doing well  28wk labs pending  Some mild lower abdominal pressure/discomfort - discussed support belt if necessary  Baby is very active  Consent on chart  TDAP to be offered next visit  Has 32wk growth US scheduled

## 2021-06-17 NOTE — ASSESSMENT & PLAN NOTE
Doing much better overall  No SI or HI  Continue on Zoloft 50 mg once daily and check in 4-6 weeks  RTW note given  Paperwork to be completed when arrives

## 2021-06-17 NOTE — PROGRESS NOTES
Patient presents for a routine prenatal visit  28W6D  GFM  No LOF, VB or CTX   Went for 28wk labs today  Patient c/o pain in her lower abdomin

## 2021-06-17 NOTE — LETTER
June 17, 2021     Patient: Denise Bryan   YOB: 1995   Date of Visit: 6/17/2021       To Whom it May Concern:    Agatha Mora is under my professional care  She was seen in my office on 6/17/2021  She may return to work on 6/21/2021  If you have any questions or concerns, please don't hesitate to call           Sincerely,          Kassie Wren PA-C        CC: No Recipients

## 2021-06-17 NOTE — PROGRESS NOTES
Assessment and Plan:    Problem List Items Addressed This Visit        Other    Anxiety and depression - Primary     Doing much better overall  No SI or HI  Continue on Zoloft 50 mg once daily and check in 4-6 weeks  RTW note given  Paperwork to be completed when arrives  28 weeks gestation of pregnancy     Stable w/o complications  Diagnoses and all orders for this visit:    Anxiety and depression    28 weeks gestation of pregnancy              Subjective:      Patient ID: Kamini Lomeli is a 27 y o  female  CC:    Chief Complaint   Patient presents with    Follow-up     Pt here for two weeks f/u        HPI:     Patient here today to discuss the increase of Zoloft to 50 mg once daily  She continues on short-term disability secondary to anxiety depression  Patient states that she is doing much better no SI or HI  She is still under stress because her and her fiance are 28 weeks pregnant and living with his parents who also house his grandparents secondary to their apartment developing leaks in the ceiling  They are moving into a new nicer apartment with 1 bedroom at the end of July  Patient is ready to return to work she feels that she is mentally more focused and able to concentrate and no longer having sleeping issues or problems with focus  She does work from home  We do not have the paperwork currently  Pregnancy overall is progressing very nicely  The following portions of the patient's history were reviewed and updated as appropriate: allergies, current medications, past family history, past medical history, past social history, past surgical history and problem list       Review of Systems   Constitutional: Negative  Negative for chills and fever  HENT: Negative  Negative for ear pain and sore throat  Eyes: Negative  Negative for pain and visual disturbance  Respiratory: Negative  Negative for cough and shortness of breath      Cardiovascular: Negative  Negative for chest pain and palpitations  Gastrointestinal: Negative  Negative for abdominal pain and vomiting  Endocrine: Negative  Genitourinary: Negative  Negative for dysuria and hematuria  Musculoskeletal: Negative  Negative for arthralgias and back pain  Skin: Negative  Negative for color change and rash  Allergic/Immunologic: Negative  Neurological: Negative  Negative for seizures and syncope  Hematological: Negative  Psychiatric/Behavioral: Negative  All other systems reviewed and are negative  Data to review:       Objective:    Vitals:    06/17/21 0914   BP: 116/74   BP Location: Left arm   Patient Position: Sitting   Cuff Size: Adult   Pulse: 102   Resp: 16   Temp: 97 8 °F (36 6 °C)   TempSrc: Tympanic   Weight: 105 kg (230 lb 6 4 oz)   Height: 5' 6" (1 676 m)        Physical Exam  Vitals and nursing note reviewed  Constitutional:       Appearance: Normal appearance  She is well-developed  HENT:      Head: Normocephalic and atraumatic  Eyes:      General: Lids are normal       Conjunctiva/sclera: Conjunctivae normal       Pupils: Pupils are equal, round, and reactive to light  Cardiovascular:      Rate and Rhythm: Normal rate and regular rhythm  Heart sounds: No murmur heard  Pulmonary:      Effort: Pulmonary effort is normal       Breath sounds: Normal breath sounds  Skin:     General: Skin is warm and dry  Neurological:      General: No focal deficit present  Mental Status: She is alert  Coordination: Coordination is intact  Psychiatric:         Mood and Affect: Mood normal          Behavior: Behavior normal  Behavior is cooperative  Thought Content:  Thought content normal          Judgment: Judgment normal

## 2021-06-17 NOTE — PATIENT INSTRUCTIONS
Problem List Items Addressed This Visit        Other    Anxiety and depression - Primary    28 weeks gestation of pregnancy

## 2021-06-18 ENCOUNTER — TELEPHONE (OUTPATIENT)
Dept: OBGYN CLINIC | Facility: CLINIC | Age: 26
End: 2021-06-18

## 2021-06-18 DIAGNOSIS — O99.019 ANEMIA AFFECTING PREGNANCY, ANTEPARTUM: Primary | ICD-10-CM

## 2021-06-18 LAB — RPR SER QL: NORMAL

## 2021-07-08 PROBLEM — O99.013 ANEMIA DURING PREGNANCY IN THIRD TRIMESTER: Status: ACTIVE | Noted: 2021-07-08

## 2021-07-08 PROBLEM — Z34.03 ENCOUNTER FOR SUPERVISION OF NORMAL FIRST PREGNANCY IN THIRD TRIMESTER: Status: ACTIVE | Noted: 2021-04-01

## 2021-07-14 ENCOUNTER — ROUTINE PRENATAL (OUTPATIENT)
Dept: OBGYN CLINIC | Facility: CLINIC | Age: 26
End: 2021-07-14

## 2021-07-14 VITALS — SYSTOLIC BLOOD PRESSURE: 120 MMHG | WEIGHT: 233.6 LBS | DIASTOLIC BLOOD PRESSURE: 62 MMHG | BODY MASS INDEX: 37.7 KG/M2

## 2021-07-14 DIAGNOSIS — Z34.03 ENCOUNTER FOR SUPERVISION OF NORMAL FIRST PREGNANCY IN THIRD TRIMESTER: Primary | ICD-10-CM

## 2021-07-14 LAB
SL AMB  POCT GLUCOSE, UA: ABNORMAL
SL AMB POCT URINE PROTEIN: ABNORMAL

## 2021-07-14 PROCEDURE — PNV: Performed by: OBSTETRICS & GYNECOLOGY

## 2021-07-14 RX ORDER — OMEPRAZOLE 20 MG/1
20 CAPSULE, DELAYED RELEASE ORAL AS NEEDED
COMMUNITY

## 2021-07-14 RX ORDER — DOXYCYCLINE HYCLATE 50 MG/1
324 CAPSULE, GELATIN COATED ORAL
COMMUNITY
End: 2021-09-29 | Stop reason: ALTCHOICE

## 2021-07-14 NOTE — PROGRESS NOTES
CBC w/Dif not run with 28 wk labs - advised to go  Birth Plan Guide and Breast pump to bring back next visit  Taking Iron and drinking orange juice occasionally  Denies LOF, VB, CTX  GFM! TDAP VIS given  Will consider

## 2021-07-15 ENCOUNTER — OFFICE VISIT (OUTPATIENT)
Dept: PSYCHIATRY | Facility: CLINIC | Age: 26
End: 2021-07-15
Payer: COMMERCIAL

## 2021-07-15 DIAGNOSIS — F32.A DEPRESSIVE DISORDER: ICD-10-CM

## 2021-07-15 DIAGNOSIS — Z3A.23 23 WEEKS GESTATION OF PREGNANCY: ICD-10-CM

## 2021-07-15 PROCEDURE — 90791 PSYCH DIAGNOSTIC EVALUATION: CPT | Performed by: NURSE PRACTITIONER
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
## 2021-07-15 NOTE — PSYCH
TREATMENT PLAN (Medication Management Only)        [facility]    Name and Date of Birth:  Ren Villa 27 y o  1995  Date of Treatment Plan: July 15, 2021  Diagnosis/Diagnoses:    1  Depressive disorder    2  23 weeks gestation of pregnancy      Strengths/Personal Resources for Self-Care: supportive family, supportive friends  Area/Areas of need (in own words): " right now, I am feeling quite well"  1  Long Term Goal: " to continue to feel good and function well in my life"  Target Date: 6 months - 1/15/2022  Person/Persons responsible for completion of goal: Vickie  2  Short Term Objective (s) - How will we reach this goal?:   A  Provider new recommended medication/dosage changes and/or continue medication(s): continue current medications as prescribed  B   N/A  Target Date: 6 months - 1/15/2022  Person/Persons Responsible for Completion of Goal: Vickie  Progress Towards Goals: stable  Treatment Modality: medication education at every visit  Review due 6 months from date of this plan: 6 months - 1/15/2022  Expected length of service: over 1 year  My Physician/PA/NP and I have developed this plan together and I agree to work on the goals and objectives  I understand the treatment goals that were developed for my treatment

## 2021-07-18 NOTE — PROGRESS NOTES
Please refer to the [facility] ultrasound report in Ob Procedures for additional information regarding today's visit

## 2021-07-19 ENCOUNTER — ULTRASOUND (OUTPATIENT)
Dept: PERINATAL CARE | Facility: OTHER | Age: 26
End: 2021-07-19
Payer: COMMERCIAL

## 2021-07-19 VITALS
HEART RATE: 99 BPM | SYSTOLIC BLOOD PRESSURE: 112 MMHG | HEIGHT: 66 IN | BODY MASS INDEX: 37.93 KG/M2 | DIASTOLIC BLOOD PRESSURE: 73 MMHG | WEIGHT: 236 LBS

## 2021-07-19 DIAGNOSIS — O99.213 MATERNAL OBESITY, ANTEPARTUM, THIRD TRIMESTER: ICD-10-CM

## 2021-07-19 DIAGNOSIS — Z3A.33 33 WEEKS GESTATION OF PREGNANCY: Primary | ICD-10-CM

## 2021-07-19 PROCEDURE — 1036F TOBACCO NON-USER: CPT | Performed by: OBSTETRICS & GYNECOLOGY

## 2021-07-19 PROCEDURE — 76816 OB US FOLLOW-UP PER FETUS: CPT | Performed by: OBSTETRICS & GYNECOLOGY

## 2021-07-19 PROCEDURE — 99213 OFFICE O/P EST LOW 20 MIN: CPT | Performed by: OBSTETRICS & GYNECOLOGY

## 2021-07-19 NOTE — PATIENT INSTRUCTIONS
Kick Counts in Pregnancy   WHAT YOU NEED TO KNOW:   Kick counts measure how much your baby is moving in your womb  A kick from your baby can be felt as a twist, turn, swish, roll, or jab  It is common to feel your baby kicking at 26 to 28 weeks of pregnancy  You may feel your baby kick as early as 20 weeks of pregnancy  You may want to start counting at 28 weeks  DISCHARGE INSTRUCTIONS:   Contact your healthcare provider immediately if:   · You feel a change in the number of kicks or movements of your baby  · You feel fewer than 10 kicks within 2 hours  · You have questions or concerns about your baby's movements  Why measure kick counts:  Your baby's movement may provide information about your baby's health  He or she may move less, or not at all, if there are problems  Your baby may move less if he or she is not getting enough oxygen or nutrition from the placenta  Do not smoke while you are pregnant  Smoking decreases the amount of oxygen that gets to your baby  Talk to your healthcare provider if you need help to quit smoking  Tell your healthcare provider as soon as you feel a change in your baby's movements  When to measure kick counts:   · Measure kick counts at the same time every day  · Measure kick counts when your baby is awake and most active  Your baby may be most active in the evening  How to measure kick counts:  Check that your baby is awake before you measure kick counts  You can wake up your baby by lightly pushing on your belly, walking, or drinking something cold  Your healthcare provider may tell you different ways to measure kick counts  You may be told to do the following:  · Use a chart or clock to keep track of the time you start and finish counting  · Sit in a chair or lie on your left side  · Place your hands on the largest part of your belly  · Count until you reach 10 kicks  Write down how much time it takes to count 10 kicks       · It may take 30 minutes to 2 hours to count 10 kicks  It should not take more than 2 hours to count 10 kicks  Follow up with your healthcare provider as directed:  Write down your questions so you remember to ask them during your visits  © Copyright [address] Information is for End User's use only and may not be sold, redistributed or otherwise used for commercial purposes  All illustrations and images included in CareNotes® are the copyrighted property of A D A M , Inc  or [facility] Agueda Valdez   The above information is an  only  It is not intended as medical advice for individual conditions or treatments  Talk to your doctor, nurse or pharmacist before following any medical regimen to see if it is safe and effective for you

## 2021-07-19 NOTE — LETTER
July 19, 2021     Mony Parker, [address]    Patient: Damien Olsen   YOB: 1995   Date of Visit: 7/19/2021       Dear Dr Ky Weir: Thank you for referring Jesus Sheppard to me for evaluation  Below are my notes for this consultation  If you have questions, please do not hesitate to call me  I look forward to following your patient along with you  Sincerely,        Al Quiñones MD        CC: No Recipients  Al Quiñones MD  7/18/2021 10:57 AM  Sign when Signing Visit    Please refer to the [facility] ultrasound report in Ob Procedures for additional information regarding today's visit

## 2021-07-29 ENCOUNTER — ROUTINE PRENATAL (OUTPATIENT)
Dept: OBGYN CLINIC | Facility: CLINIC | Age: 26
End: 2021-07-29

## 2021-07-29 VITALS — WEIGHT: 240 LBS | DIASTOLIC BLOOD PRESSURE: 64 MMHG | BODY MASS INDEX: 38.74 KG/M2 | SYSTOLIC BLOOD PRESSURE: 104 MMHG

## 2021-07-29 DIAGNOSIS — Z34.03 ENCOUNTER FOR SUPERVISION OF NORMAL FIRST PREGNANCY IN THIRD TRIMESTER: Primary | ICD-10-CM

## 2021-07-29 LAB
SL AMB  POCT GLUCOSE, UA: ABNORMAL
SL AMB POCT URINE PROTEIN: 30

## 2021-07-29 PROCEDURE — PNV: Performed by: OBSTETRICS & GYNECOLOGY

## 2021-07-29 NOTE — PROGRESS NOTES
GBS Next Visit  Will bring Breast pump rx and Birth Plan guide next visit  Denies LOF, VB, CTX  GFM! Considering Tdap next visit

## 2021-08-04 ENCOUNTER — ROUTINE PRENATAL (OUTPATIENT)
Dept: OBGYN CLINIC | Facility: CLINIC | Age: 26
End: 2021-08-04
Payer: COMMERCIAL

## 2021-08-04 VITALS — SYSTOLIC BLOOD PRESSURE: 110 MMHG | DIASTOLIC BLOOD PRESSURE: 60 MMHG | BODY MASS INDEX: 38.77 KG/M2 | WEIGHT: 240.2 LBS

## 2021-08-04 DIAGNOSIS — Z23 NEED FOR DIPHTHERIA-TETANUS-PERTUSSIS (TDAP) VACCINE: ICD-10-CM

## 2021-08-04 DIAGNOSIS — Z34.03 ENCOUNTER FOR SUPERVISION OF NORMAL FIRST PREGNANCY IN THIRD TRIMESTER: Primary | ICD-10-CM

## 2021-08-04 LAB
SL AMB  POCT GLUCOSE, UA: NORMAL
SL AMB POCT URINE PROTEIN: NORMAL

## 2021-08-04 PROCEDURE — 90715 TDAP VACCINE 7 YRS/> IM: CPT

## 2021-08-04 PROCEDURE — 90471 IMMUNIZATION ADMIN: CPT

## 2021-08-04 PROCEDURE — PNV: Performed by: OBSTETRICS & GYNECOLOGY

## 2021-08-04 NOTE — PROGRESS NOTES
Will return Birth Plan at next visit- Pt wrote her own  Breast pump RX faxed  Tdap shot Given today  Denies LOF, VB, CTX  Back pain - upper Left shoulder pain/cramping  Started yesterday  Very painful  6-7 in pain level  Pain is unchanged with repositioning  GFM! Moved last week! Will check what new pharmacy is  Baby shower is this weekend

## 2021-08-08 NOTE — PROGRESS NOTES
Please refer to the [facility] ultrasound report in Ob Procedures for additional information regarding today's visit

## 2021-08-09 ENCOUNTER — ULTRASOUND (OUTPATIENT)
Dept: PERINATAL CARE | Facility: OTHER | Age: 26
End: 2021-08-09
Payer: COMMERCIAL

## 2021-08-09 VITALS
DIASTOLIC BLOOD PRESSURE: 53 MMHG | SYSTOLIC BLOOD PRESSURE: 95 MMHG | HEART RATE: 79 BPM | BODY MASS INDEX: 38.73 KG/M2 | HEIGHT: 66 IN | WEIGHT: 241 LBS

## 2021-08-09 DIAGNOSIS — O99.213 MATERNAL OBESITY, ANTEPARTUM, THIRD TRIMESTER: ICD-10-CM

## 2021-08-09 DIAGNOSIS — Z3A.36 36 WEEKS GESTATION OF PREGNANCY: Primary | ICD-10-CM

## 2021-08-09 PROCEDURE — 3008F BODY MASS INDEX DOCD: CPT | Performed by: OBSTETRICS & GYNECOLOGY

## 2021-08-09 PROCEDURE — 59025 FETAL NON-STRESS TEST: CPT | Performed by: OBSTETRICS & GYNECOLOGY

## 2021-08-09 PROCEDURE — 76815 OB US LIMITED FETUS(S): CPT | Performed by: OBSTETRICS & GYNECOLOGY

## 2021-08-09 NOTE — PROGRESS NOTES
NST procedure and expected outcome explained to patient  Daily fetal kick count discussed with handout given  Patient verbalized understanding of all and was receptive    Pt reports daily fetal movement  Susan Renee RN

## 2021-08-09 NOTE — PATIENT INSTRUCTIONS
Nonstress Test for Pregnancy   WHAT YOU NEED TO KNOW:   What do I need to know about a nonstress test?  A nonstress test measures your baby's heart rate and movements  Nonstress means that no stress will be placed on your baby during the test   How do I prepare for a nonstress test?  Your healthcare provider will talk to you about how to prepare for this test  He or she may tell you to eat and drink plenty of fluids before your test  If you smoke, you may be asked not to smoke within 2 hours before the test  He or she will also tell you which medicines to take or not take on the day of your test   What will happen during a nonstress test?  You may be asked to lie down or recline back for the test on a bed  One or 2 belts with sensors will be placed around your abdomen  Your baby's heart rate will be recorded with a machine  If your baby does not move, your baby may be asleep  Your healthcare provider may make a noise near your abdomen to try to wake your baby  The test usually takes about 20 minutes, but can take longer if your baby needs to be awakened  What do I need to know about the test results? Your baby will be expected to move at least 2 times for a certain amount of time  Your baby's heart rate will be expected to go up by a certain number of beats per minute during movement  If your baby does not move as expected, the test may need to be repeated or you may need other tests  CARE AGREEMENT:   You have the right to help plan your care  Learn about your health condition and how it may be treated  Discuss treatment options with your healthcare providers to decide what care you want to receive  You always have the right to refuse treatment  The above information is an  only  It is not intended as medical advice for individual conditions or treatments  Talk to your doctor, nurse or pharmacist before following any medical regimen to see if it is safe and effective for you    © Copyright [employer] 2021 Information is for [employer] use only and may not be sold, redistributed or otherwise used for commercial purposes   All illustrations and images included in CareNotes® are the copyrighted property of A D A M , Inc  or [facility] Agueda Valdez

## 2021-08-09 NOTE — LETTER
August 9, 2021     Ladonna Jacobsen, [address]    Patient: Divine Alonso   YOB: 1995   Date of Visit: 8/9/2021       Dear Dr Soto Speaks: Thank you for referring Dmitry Scott to me for evaluation  Below are my notes for this consultation  If you have questions, please do not hesitate to call me  I look forward to following your patient along with you  Sincerely,        Gold Bourne MD        CC: No Recipients  Gold Bourne MD  8/8/2021 10:45 AM  Sign when Signing Visit   Please refer to the [facility] ultrasound report in Ob Procedures for additional information regarding today's visit

## 2021-08-12 ENCOUNTER — ROUTINE PRENATAL (OUTPATIENT)
Dept: OBGYN CLINIC | Facility: CLINIC | Age: 26
End: 2021-08-12

## 2021-08-12 ENCOUNTER — HOSPITAL ENCOUNTER (OUTPATIENT)
Facility: HOSPITAL | Age: 26
Discharge: HOME | End: 2021-08-12
Attending: STUDENT IN AN ORGANIZED HEALTH CARE EDUCATION/TRAINING PROGRAM | Admitting: STUDENT IN AN ORGANIZED HEALTH CARE EDUCATION/TRAINING PROGRAM
Payer: COMMERCIAL

## 2021-08-12 ENCOUNTER — HOSPITAL ENCOUNTER (OUTPATIENT)
Facility: HOSPITAL | Age: 26
End: 2021-08-12
Attending: STUDENT IN AN ORGANIZED HEALTH CARE EDUCATION/TRAINING PROGRAM | Admitting: STUDENT IN AN ORGANIZED HEALTH CARE EDUCATION/TRAINING PROGRAM
Payer: COMMERCIAL

## 2021-08-12 VITALS
BODY MASS INDEX: 39.37 KG/M2 | WEIGHT: 245 LBS | DIASTOLIC BLOOD PRESSURE: 59 MMHG | HEART RATE: 101 BPM | SYSTOLIC BLOOD PRESSURE: 118 MMHG | RESPIRATION RATE: 16 BRPM | HEIGHT: 66 IN | TEMPERATURE: 98.3 F

## 2021-08-12 VITALS — DIASTOLIC BLOOD PRESSURE: 65 MMHG | WEIGHT: 245 LBS | SYSTOLIC BLOOD PRESSURE: 105 MMHG | BODY MASS INDEX: 39.54 KG/M2

## 2021-08-12 DIAGNOSIS — Z34.03 ENCOUNTER FOR SUPERVISION OF NORMAL FIRST PREGNANCY IN THIRD TRIMESTER: Primary | ICD-10-CM

## 2021-08-12 PROBLEM — Z3A.36 36 WEEKS GESTATION OF PREGNANCY: Status: ACTIVE | Noted: 2021-04-21

## 2021-08-12 LAB
SL AMB  POCT GLUCOSE, UA: ABNORMAL
SL AMB POCT URINE PROTEIN: ABNORMAL

## 2021-08-12 PROCEDURE — 99213 OFFICE O/P EST LOW 20 MIN: CPT | Performed by: OBSTETRICS & GYNECOLOGY

## 2021-08-12 PROCEDURE — 76815 OB US LIMITED FETUS(S): CPT

## 2021-08-12 PROCEDURE — 99213 OFFICE O/P EST LOW 20 MIN: CPT

## 2021-08-12 PROCEDURE — 87150 DNA/RNA AMPLIFIED PROBE: CPT | Performed by: OBSTETRICS & GYNECOLOGY

## 2021-08-12 PROCEDURE — PNV: Performed by: OBSTETRICS & GYNECOLOGY

## 2021-08-12 PROCEDURE — G0463 HOSPITAL OUTPT CLINIC VISIT: HCPCS

## 2021-08-12 PROCEDURE — NC001 PR NO CHARGE: Performed by: OBSTETRICS & GYNECOLOGY

## 2021-08-12 NOTE — PROCEDURES
Chino Hartley, a  at [id number] with an DRU of 9/3/2021, by Ultrasound, was seen at [address] for the following procedure(s): $Procedure Type: MAYURI]    Nonstress Test  Variability: Moderate  Decelerations: None  Accelerations: Yes  Acoustic Stimulator: No  Baseline: 155 BPM  Uterine Irritability: No  Contractions: Not present    4 Quadrant MAYURI  MAYURI Q1 (cm): 2 2 cm  MAYURI Q2 (cm): 5 8 cm  MAYURI Q3 (cm): 3 5 cm  MAYURI Q4 (cm): 5 cm  MAYURI TOTAL (cm): 16 5 cm             Clark Tsai MD   OB/Gyn PGY-1   6:00 PM  21             Interpretation  Nonstress Test Interpretation: Reactive

## 2021-08-12 NOTE — PROGRESS NOTES
Patient presents for a routine prenatal visit  [id number]  +GFM  GBS collected today  No LOF, VB or CTX  Patient stated the last day or two she has noticed the baby isn't moving as much as she was  She did state she has been active lately since they have just moved so she may not be noticing her move as much 
Rajeev Verdin presents for routine PN  Has been feeling FM but noticeably less, unsure if just not paying attention as she has been so active  Has not really been doing kick counts  Will sent to triage for NST/MAYURI  GBS collected  RTO 1 week 
Launch [employer] and print the 'Prescriptions from this Visit' Report

## 2021-08-12 NOTE — PROGRESS NOTES
L&D Triage Note - OB/GYN  Vinicio Stanley 27 y o  female MRN: [id number]  Unit/Bed#: Nj Call Encounter: [id number]        Patient is seen by Didier Mims    ASSESSMENT/PLAN  Vinicio Stanley is a 27 y o   at 36w6d here for evaluation of decreased fetal movement MAYURI within normal limits  1) decreased fetal movement  - MAYURI of 16 46, NST reactive, patient reassured      2)  Discharge instructions  - Patient instructed to call if experiencing worsening contractions, vaginal bleeding, loss of fluid or decreased fetal movement  - Will follow up with OBGYN in office    D/w Dr Nasrin Gilmore  ______________    SUBJECTIVE    DRU: Estimated Date of Delivery: 9/3/21    HPI:  27 y o   36w6d presents with complaint of decreased fetal movement since this morning  Arrived late to her appointment in the office today, not able to get an NST MAYURI so presenting to triage for evaluation  States that baby is moving but feels less strong usual   Has improved since this morning  Contractions: no  Leakage of fluid: no  Vaginal Bleeding: no  Fetal movement: present    Her obstetrical history is significant for obesity, scoliosis, anemia    ROS:  Constitutional: Negative  Respiratory: Negative  Cardiovascular: Negative    Gastrointestinal: Negative    Physical Exam  GEN: Well appearing, no apparent distress   ABD: Gravid, soft    OBJECTIVE:  /59 (BP Location: Right arm)   Pulse 101   Temp 98 3 °F (36 8 °C) (Oral)   Resp 16   Ht 5' 6" (1 676 m)   Wt 111 kg (245 lb)   LMP 2020 (Approximate)   BMI 39 54 kg/m²   Body mass index is 39 54 kg/m²    Labs:   Recent Results (from the past 24 hour(s))   POCT urine dip    Collection Time: 21  3:50 PM   Result Value Ref Range    POCT URINE PROTEIN +     GLUCOSE, UA neg          FHT:  Baseline Rate: 155 bpm  Variability: Moderate 6-25 bpm  Accelerations: 15 x 15 or greater, With fetal movment  Decelerations: None  FHR Category: Category I    TOCO:   Contraction Frequency (minutes): none    IMAGING:        TAUS  4 Quadrant MAYURI  MAYURI Q1 (cm): 2 2 cm  MAYURI Q2 (cm): 5 8 cm  MAYURI Q3 (cm): 3 5 cm  MAYURI Q4 (cm): 5 cm  MAYURI TOTAL (cm): 16 5 cm                Maryjane Machado MD  OB/GYN PGY-1  8/12/2021  6:02 PM      Portions of the record may have been created with voice recognition software  Occasional wrong word or "sound a like" substitutions may have occurred due to the inherent limitations of voice recognition software    Read the chart carefully and recognize, using context, where substitutions have occurred

## 2021-08-14 LAB — GP B STREP DNA SPEC QL NAA+PROBE: NEGATIVE

## 2021-08-18 ENCOUNTER — TELEPHONE (OUTPATIENT)
Dept: OBGYN CLINIC | Facility: CLINIC | Age: 26
End: 2021-08-18

## 2021-08-18 ENCOUNTER — ULTRASOUND (OUTPATIENT)
Dept: PERINATAL CARE | Facility: CLINIC | Age: 26
End: 2021-08-18
Payer: COMMERCIAL

## 2021-08-18 VITALS
WEIGHT: 243.8 LBS | DIASTOLIC BLOOD PRESSURE: 78 MMHG | SYSTOLIC BLOOD PRESSURE: 118 MMHG | HEART RATE: 101 BPM | BODY MASS INDEX: 39.18 KG/M2 | HEIGHT: 66 IN

## 2021-08-18 DIAGNOSIS — Z3A.37 37 WEEKS GESTATION OF PREGNANCY: Primary | ICD-10-CM

## 2021-08-18 DIAGNOSIS — O99.213 MATERNAL OBESITY, ANTEPARTUM, THIRD TRIMESTER: ICD-10-CM

## 2021-08-18 PROCEDURE — 59025 FETAL NON-STRESS TEST: CPT | Performed by: OBSTETRICS & GYNECOLOGY

## 2021-08-18 PROCEDURE — 76815 OB US LIMITED FETUS(S): CPT | Performed by: OBSTETRICS & GYNECOLOGY

## 2021-08-18 NOTE — PATIENT INSTRUCTIONS
Kick Counts in Pregnancy   AMBULATORY CARE:   Kick counts  measure how much your baby is moving in your womb  A kick from your baby can be felt as a twist, turn, swish, roll, or jab  It is common to feel your baby kicking at 26 to 28 weeks of pregnancy  You may feel your baby kick as early as 20 weeks of pregnancy  You may want to start counting at 28 weeks  Contact your healthcare provider immediately if:   · You feel a change in the number of kicks or movements of your baby  · You feel fewer than 10 kicks within 2 hours  · You have questions or concerns about your baby's movements  Why measure kick counts:  Your baby's movement may provide information about your baby's health  He or she may move less, or not at all, if there are problems  Your baby may move less if he or she is not getting enough oxygen or nutrition from the placenta  Do not smoke while you are pregnant  Smoking decreases the amount of oxygen that gets to your baby  Talk to your healthcare provider if you need help to quit smoking  Tell your healthcare provider as soon as you feel a change in your baby's movements  When to measure kick counts:   · Measure kick counts at the same time every day  · Measure kick counts when your baby is awake and most active  Your baby may be most active in the evening  How to measure kick counts:  Check that your baby is awake before you measure kick counts  You can wake up your baby by lightly pushing on your belly, walking, or drinking something cold  Your healthcare provider may tell you different ways to measure kick counts  You may be told to do the following:  · Use a chart or clock to keep track of the time you start and finish counting  · Sit in a chair or lie on your left side  · Place your hands on the largest part of your belly  · Count until you reach 10 kicks  Write down how much time it takes to count 10 kicks  · It may take 30 minutes to 2 hours to count 10 kicks  It should not take more than 2 hours to count 10 kicks  Follow up with your healthcare provider as directed:  Write down your questions so you remember to ask them during your visits  © Copyright [employer] 2021 Information is for End User's use only and may not be sold, redistributed or otherwise used for commercial purposes  All illustrations and images included in CareNotes® are the copyrighted property of A D A M , Inc  or [facility] Agueda Valdez   The above information is an  only  It is not intended as medical advice for individual conditions or treatments  Talk to your doctor, nurse or pharmacist before following any medical regimen to see if it is safe and effective for you

## 2021-08-18 NOTE — PROGRESS NOTES
Repeat Non-Stress Testing:    Pt verbalizes +FM  Pt denies ALL:               Leaking of fluid   Contractions   Vaginal bleeding   Decreased fetal movement    Patient has no questions or concerns  Dr Martinez Standard viewed NST prior to completion of appointment

## 2021-08-18 NOTE — LETTER
August 18, 2021     Brook Brooke, [address]  On license of [facility] [address]    Patient: Damien Olsen   YOB: 1995   Date of Visit: 8/18/2021       Dear Dr Ardella Fothergill: Thank you for referring Jesus Sheppard to me for evaluation  Below are my notes for this consultation  If you have questions, please do not hesitate to call me  I look forward to following your patient along with you  Sincerely,        Al Quiñones MD        CC: No Recipients  Al Quiñones MD  8/18/2021  6:40 AM  Sign when Signing Visit   Please refer to the [facility] ultrasound report in Ob Procedures for additional information regarding today's visit

## 2021-08-18 NOTE — PROGRESS NOTES
Please refer to the [facility] ultrasound report in Ob Procedures for additional information regarding today's visit

## 2021-08-19 ENCOUNTER — ROUTINE PRENATAL (OUTPATIENT)
Dept: OBGYN CLINIC | Facility: CLINIC | Age: 26
End: 2021-08-19

## 2021-08-19 VITALS — SYSTOLIC BLOOD PRESSURE: 105 MMHG | BODY MASS INDEX: 38.8 KG/M2 | WEIGHT: 240.4 LBS | DIASTOLIC BLOOD PRESSURE: 65 MMHG

## 2021-08-19 DIAGNOSIS — Z34.03 ENCOUNTER FOR SUPERVISION OF NORMAL FIRST PREGNANCY IN THIRD TRIMESTER: Primary | ICD-10-CM

## 2021-08-19 DIAGNOSIS — O21.9 NAUSEA AND VOMITING DURING PREGNANCY: ICD-10-CM

## 2021-08-19 LAB
SL AMB  POCT GLUCOSE, UA: ABNORMAL
SL AMB POCT URINE PROTEIN: ABNORMAL

## 2021-08-19 PROCEDURE — PNV: Performed by: OBSTETRICS & GYNECOLOGY

## 2021-08-19 NOTE — PROGRESS NOTES
Patient presents for a routine prenatal visit  37W6D  +GFM  No LOF or VB   Having some nausea and vomiting, having lower pelvic pressure and pain

## 2021-08-19 NOTE — PROGRESS NOTES
Haylee Moreno presents for routine PN visit - she has been having nausea today  Had some vomiting yesterday along with some diarrhea  Denies epigastric pain/headaches  Tried to eat some chinese food - this did not sit well  Has been drinking water  Encouraged her to continue PO hydration (pedialyte, gatorade, etc) and bland food when she feels able to eat again  Will check outpatient labs - CBC & CMP  Baby is active, no bleeding/LOF  Some cramping and soreness in her abdomen  GBS neg, declines cervical exam    Precautions reviewed  Weekly APFS with [facility]

## 2021-08-21 ENCOUNTER — NURSE TRIAGE (OUTPATIENT)
Dept: OTHER | Facility: OTHER | Age: 26
End: 2021-08-21

## 2021-08-21 ENCOUNTER — HOSPITAL ENCOUNTER (INPATIENT)
Facility: HOSPITAL | Age: 26
LOS: 3 days | Discharge: HOME | End: 2021-08-25
Attending: OBSTETRICS & GYNECOLOGY | Admitting: OBSTETRICS & GYNECOLOGY
Payer: COMMERCIAL

## 2021-08-21 DIAGNOSIS — Z3A.38 38 WEEKS GESTATION OF PREGNANCY: Primary | ICD-10-CM

## 2021-08-21 DIAGNOSIS — R58 BLEEDING OF UNKNOWN ORIGIN: ICD-10-CM

## 2021-08-22 ENCOUNTER — ANESTHESIA (INPATIENT)
Dept: ANESTHESIOLOGY | Facility: HOSPITAL | Age: 26
End: 2021-08-22
Payer: COMMERCIAL

## 2021-08-22 ENCOUNTER — ANESTHESIA EVENT (INPATIENT)
Dept: ANESTHESIOLOGY | Facility: HOSPITAL | Age: 26
End: 2021-08-22
Payer: COMMERCIAL

## 2021-08-22 PROBLEM — Z3A.38 38 WEEKS GESTATION OF PREGNANCY: Status: ACTIVE | Noted: 2021-04-21

## 2021-08-22 LAB
ABO GROUP BLD: NORMAL
ALBUMIN SERPL BCP-MCNC: 2.3 G/DL (ref 3.5–5)
ALP SERPL-CCNC: 156 U/L (ref 46–116)
ALT SERPL W P-5'-P-CCNC: 13 U/L (ref 12–78)
ANION GAP SERPL CALCULATED.3IONS-SCNC: 12 MMOL/L (ref 4–13)
AST SERPL W P-5'-P-CCNC: 8 U/L (ref 5–45)
BACTERIA UR QL AUTO: ABNORMAL /HPF
BILIRUB SERPL-MCNC: 0.21 MG/DL (ref 0.2–1)
BILIRUB UR QL STRIP: NEGATIVE
BLD GP AB SCN SERPL QL: NEGATIVE
BUN SERPL-MCNC: 9 MG/DL (ref 5–25)
CALCIUM ALBUM COR SERPL-MCNC: 10 MG/DL (ref 8.3–10.1)
CALCIUM SERPL-MCNC: 8.6 MG/DL (ref 8.3–10.1)
CHLORIDE SERPL-SCNC: 107 MMOL/L (ref 100–108)
CLARITY UR: ABNORMAL
CO2 SERPL-SCNC: 20 MMOL/L (ref 21–32)
COLOR UR: YELLOW
CREAT SERPL-MCNC: 0.73 MG/DL (ref 0.6–1.3)
CREAT UR-MCNC: 223 MG/DL
ERYTHROCYTE [DISTWIDTH] IN BLOOD BY AUTOMATED COUNT: 14.7 % (ref 11.6–15.1)
GFR SERPL CREATININE-BSD FRML MDRD: 116 ML/MIN/1.73SQ M
GLUCOSE SERPL-MCNC: 85 MG/DL (ref 65–140)
GLUCOSE UR STRIP-MCNC: NEGATIVE MG/DL
HCT VFR BLD AUTO: 29.1 % (ref 34.8–46.1)
HGB BLD-MCNC: 8.9 G/DL (ref 11.5–15.4)
HGB UR QL STRIP.AUTO: NEGATIVE
KETONES UR STRIP-MCNC: ABNORMAL MG/DL
LEUKOCYTE ESTERASE UR QL STRIP: NEGATIVE
MCH RBC QN AUTO: 24.1 PG (ref 26.8–34.3)
MCHC RBC AUTO-ENTMCNC: 30.6 G/DL (ref 31.4–37.4)
MCV RBC AUTO: 79 FL (ref 82–98)
NITRITE UR QL STRIP: NEGATIVE
NON-SQ EPI CELLS URNS QL MICRO: ABNORMAL /HPF
PH UR STRIP.AUTO: 6 [PH]
PLATELET # BLD AUTO: 178 THOUSANDS/UL (ref 149–390)
PMV BLD AUTO: 12.1 FL (ref 8.9–12.7)
POTASSIUM SERPL-SCNC: 3.7 MMOL/L (ref 3.5–5.3)
PROT SERPL-MCNC: 6.4 G/DL (ref 6.4–8.2)
PROT UR STRIP-MCNC: ABNORMAL MG/DL
PROT UR-MCNC: 105 MG/DL
PROT/CREAT UR: 0.47 MG/G{CREAT} (ref 0–0.1)
RBC # BLD AUTO: 3.69 MILLION/UL (ref 3.81–5.12)
RBC #/AREA URNS AUTO: ABNORMAL /HPF
RH BLD: POSITIVE
SODIUM SERPL-SCNC: 139 MMOL/L (ref 136–145)
SP GR UR STRIP.AUTO: >=1.03 (ref 1–1.03)
SPECIMEN EXPIRATION DATE: NORMAL
UROBILINOGEN UR QL STRIP.AUTO: 1 E.U./DL
WBC # BLD AUTO: 9.41 THOUSAND/UL (ref 4.31–10.16)
WBC #/AREA URNS AUTO: ABNORMAL /HPF

## 2021-08-22 PROCEDURE — 86901 BLOOD TYPING SEROLOGIC RH(D): CPT

## 2021-08-22 PROCEDURE — G0463 HOSPITAL OUTPT CLINIC VISIT: HCPCS

## 2021-08-22 PROCEDURE — 86592 SYPHILIS TEST NON-TREP QUAL: CPT

## 2021-08-22 PROCEDURE — 86850 RBC ANTIBODY SCREEN: CPT

## 2021-08-22 PROCEDURE — 86900 BLOOD TYPING SEROLOGIC ABO: CPT

## 2021-08-22 PROCEDURE — 85027 COMPLETE CBC AUTOMATED: CPT

## 2021-08-22 PROCEDURE — 86923 COMPATIBILITY TEST ELECTRIC: CPT

## 2021-08-22 PROCEDURE — 4A1HXCZ MONITORING OF PRODUCTS OF CONCEPTION, CARDIAC RATE, EXTERNAL APPROACH: ICD-10-PCS | Performed by: STUDENT IN AN ORGANIZED HEALTH CARE EDUCATION/TRAINING PROGRAM

## 2021-08-22 PROCEDURE — 3E033VJ INTRODUCTION OF OTHER HORMONE INTO PERIPHERAL VEIN, PERCUTANEOUS APPROACH: ICD-10-PCS | Performed by: STUDENT IN AN ORGANIZED HEALTH CARE EDUCATION/TRAINING PROGRAM

## 2021-08-22 PROCEDURE — 81001 URINALYSIS AUTO W/SCOPE: CPT

## 2021-08-22 PROCEDURE — NC001 PR NO CHARGE: Performed by: OBSTETRICS & GYNECOLOGY

## 2021-08-22 PROCEDURE — 99214 OFFICE O/P EST MOD 30 MIN: CPT

## 2021-08-22 PROCEDURE — NC001 PR NO CHARGE: Performed by: STUDENT IN AN ORGANIZED HEALTH CARE EDUCATION/TRAINING PROGRAM

## 2021-08-22 PROCEDURE — 82570 ASSAY OF URINE CREATININE: CPT

## 2021-08-22 PROCEDURE — 80053 COMPREHEN METABOLIC PANEL: CPT

## 2021-08-22 PROCEDURE — 84156 ASSAY OF PROTEIN URINE: CPT

## 2021-08-22 RX ORDER — ONDANSETRON 2 MG/ML
4 INJECTION INTRAMUSCULAR; INTRAVENOUS EVERY 4 HOURS PRN
Status: DISCONTINUED | OUTPATIENT
Start: 2021-08-22 | End: 2021-08-23

## 2021-08-22 RX ORDER — MAGNESIUM SULFATE HEPTAHYDRATE 40 MG/ML
2 INJECTION, SOLUTION INTRAVENOUS ONCE
Status: COMPLETED | OUTPATIENT
Start: 2021-08-22 | End: 2021-08-22

## 2021-08-22 RX ORDER — SODIUM CHLORIDE, SODIUM LACTATE, POTASSIUM CHLORIDE, CALCIUM CHLORIDE 600; 310; 30; 20 MG/100ML; MG/100ML; MG/100ML; MG/100ML
125 INJECTION, SOLUTION INTRAVENOUS CONTINUOUS
Status: DISCONTINUED | OUTPATIENT
Start: 2021-08-22 | End: 2021-08-23

## 2021-08-22 RX ORDER — OXYTOCIN/RINGER'S LACTATE 30/500 ML
1-30 PLASTIC BAG, INJECTION (ML) INTRAVENOUS
Status: DISCONTINUED | OUTPATIENT
Start: 2021-08-22 | End: 2021-08-23

## 2021-08-22 RX ORDER — BUTORPHANOL TARTRATE 1 MG/ML
1 INJECTION, SOLUTION INTRAMUSCULAR; INTRAVENOUS
Status: DISCONTINUED | OUTPATIENT
Start: 2021-08-22 | End: 2021-08-23

## 2021-08-22 RX ORDER — BUTALBITAL, ACETAMINOPHEN AND CAFFEINE 50; 325; 40 MG/1; MG/1; MG/1
1 TABLET ORAL EVERY 4 HOURS PRN
Status: DISCONTINUED | OUTPATIENT
Start: 2021-08-22 | End: 2021-08-22

## 2021-08-22 RX ORDER — ACETAMINOPHEN 325 MG/1
650 TABLET ORAL EVERY 6 HOURS PRN
Status: DISCONTINUED | OUTPATIENT
Start: 2021-08-22 | End: 2021-08-23 | Stop reason: SDUPTHER

## 2021-08-22 RX ORDER — DIPHENHYDRAMINE HYDROCHLORIDE 50 MG/ML
25 INJECTION INTRAMUSCULAR; INTRAVENOUS EVERY 6 HOURS PRN
Status: DISCONTINUED | OUTPATIENT
Start: 2021-08-22 | End: 2021-08-23

## 2021-08-22 RX ORDER — LIDOCAINE HYDROCHLORIDE AND EPINEPHRINE 15; 5 MG/ML; UG/ML
INJECTION, SOLUTION EPIDURAL
Status: COMPLETED | OUTPATIENT
Start: 2021-08-22 | End: 2021-08-22

## 2021-08-22 RX ORDER — BUTALBITAL, ACETAMINOPHEN AND CAFFEINE 50; 325; 40 MG/1; MG/1; MG/1
1 TABLET ORAL ONCE
Status: COMPLETED | OUTPATIENT
Start: 2021-08-22 | End: 2021-08-22

## 2021-08-22 RX ORDER — NALBUPHINE HCL 10 MG/ML
5 AMPUL (ML) INJECTION
Status: DISCONTINUED | OUTPATIENT
Start: 2021-08-22 | End: 2021-08-23

## 2021-08-22 RX ORDER — PROMETHAZINE HYDROCHLORIDE 25 MG/ML
25 INJECTION, SOLUTION INTRAMUSCULAR; INTRAVENOUS ONCE
Status: COMPLETED | OUTPATIENT
Start: 2021-08-22 | End: 2021-08-22

## 2021-08-22 RX ORDER — METOCLOPRAMIDE HYDROCHLORIDE 5 MG/ML
10 INJECTION INTRAMUSCULAR; INTRAVENOUS EVERY 6 HOURS PRN
Status: DISCONTINUED | OUTPATIENT
Start: 2021-08-22 | End: 2021-08-22

## 2021-08-22 RX ORDER — METOCLOPRAMIDE HYDROCHLORIDE 5 MG/ML
10 INJECTION INTRAMUSCULAR; INTRAVENOUS ONCE
Status: COMPLETED | OUTPATIENT
Start: 2021-08-22 | End: 2021-08-22

## 2021-08-22 RX ORDER — SODIUM CHLORIDE, SODIUM LACTATE, POTASSIUM CHLORIDE, CALCIUM CHLORIDE 600; 310; 30; 20 MG/100ML; MG/100ML; MG/100ML; MG/100ML
125 INJECTION, SOLUTION INTRAVENOUS CONTINUOUS
Status: DISCONTINUED | OUTPATIENT
Start: 2021-08-22 | End: 2021-08-22

## 2021-08-22 RX ADMIN — SODIUM CHLORIDE, SODIUM LACTATE, POTASSIUM CHLORIDE, AND CALCIUM CHLORIDE 125 ML/HR: .6; .31; .03; .02 INJECTION, SOLUTION INTRAVENOUS at 12:52

## 2021-08-22 RX ADMIN — Medication 2 MILLI-UNITS/MIN: at 18:28

## 2021-08-22 RX ADMIN — SODIUM CHLORIDE 500 MG: 0.9 INJECTION, SOLUTION INTRAVENOUS at 07:42

## 2021-08-22 RX ADMIN — PROMETHAZINE HYDROCHLORIDE 25 MG: 25 INJECTION INTRAMUSCULAR; INTRAVENOUS at 19:45

## 2021-08-22 RX ADMIN — BUTORPHANOL TARTRATE 1 MG: 1 INJECTION, SOLUTION INTRAMUSCULAR; INTRAVENOUS at 19:45

## 2021-08-22 RX ADMIN — METOCLOPRAMIDE HYDROCHLORIDE 10 MG: 5 INJECTION INTRAMUSCULAR; INTRAVENOUS at 05:51

## 2021-08-22 RX ADMIN — ROPIVACAINE HYDROCHLORIDE: 2 INJECTION, SOLUTION EPIDURAL; INFILTRATION at 23:55

## 2021-08-22 RX ADMIN — LIDOCAINE HYDROCHLORIDE AND EPINEPHRINE 5 ML: 15; 5 INJECTION, SOLUTION EPIDURAL at 23:50

## 2021-08-22 RX ADMIN — SODIUM CHLORIDE, SODIUM LACTATE, POTASSIUM CHLORIDE, AND CALCIUM CHLORIDE 125 ML/HR: .6; .31; .03; .02 INJECTION, SOLUTION INTRAVENOUS at 05:56

## 2021-08-22 RX ADMIN — MAGNESIUM SULFATE HEPTAHYDRATE 2 G: 40 INJECTION, SOLUTION INTRAVENOUS at 05:54

## 2021-08-22 RX ADMIN — BUTALBITAL, ACETAMINOPHEN AND CAFFEINE 1 TABLET: 50; 325; 40 TABLET ORAL at 03:37

## 2021-08-22 RX ADMIN — SODIUM CHLORIDE, SODIUM LACTATE, POTASSIUM CHLORIDE, AND CALCIUM CHLORIDE 125 ML/HR: .6; .31; .03; .02 INJECTION, SOLUTION INTRAVENOUS at 20:34

## 2021-08-22 RX ADMIN — ACETAMINOPHEN 650 MG: 325 TABLET, FILM COATED ORAL at 00:39

## 2021-08-22 RX ADMIN — SODIUM CHLORIDE, SODIUM LACTATE, POTASSIUM CHLORIDE, AND CALCIUM CHLORIDE 125 ML/HR: .6; .31; .03; .02 INJECTION, SOLUTION INTRAVENOUS at 14:55

## 2021-08-22 NOTE — OB LABOR/OXYTOCIN SAFETY PROGRESS
Oxytocin Safety Progress Check Note - Lisa Lino 27 y o  female MRN: [id number]    Unit/Bed#: -01 Encounter: [id number]    Dose (hoa-units/min) Oxytocin: 2 hoa-units/min  Contraction Frequency (minutes): irregular x1  Contraction Quality: Mild  Tachysystole: No   Cervical Dilation: Fingertip        Cervical Effacement: 0  Fetal Station: -3  Baseline Rate: 140 bpm  Fetal Heart Rate: 116 BPM  FHR Category: Category I               Vital Signs:   Vitals:    08/22/21 1920   BP: 128/76   Pulse: 93   Resp:    Temp:    SpO2:            Notes/comments:    May balloon remains in place  Fetus category 1  Continue titrating oxytocin up to 10 mu/min until may balloon out    Vanessa Bryant MD 8/22/2021 7:58 PM

## 2021-08-22 NOTE — ANESTHESIA PREPROCEDURE EVALUATION
Procedure:  LABOR ANALGESIA    Relevant Problems   GYN   (+) 38 weeks gestation of pregnancy   (+) Encounter for supervision of normal first pregnancy in third trimester      HEMATOLOGY   (+) Anemia during pregnancy in third trimester      MUSCULOSKELETAL   (+) Back strain   (+) Scoliosis deformity of spine      NEURO/PSYCH   (+) Anxiety and depression      Recent labs personally reviewed:  Lab Results   Component Value Date    WBC 9 41 08/22/2021    HGB 8 9 (L) 08/22/2021     08/22/2021     Lab Results   Component Value Date    K 3 7 08/22/2021    BUN 9 08/22/2021    CREATININE 0 73 08/22/2021     No results found for: PTT   No results found for: INR    Blood type B    No results found for: HGBA1C    Physical Exam    Airway       Dental       Cardiovascular  Rhythm: regular, Rate: normal,     Pulmonary  Breath sounds clear to auscultation,     Other Findings        Anesthesia Plan  ASA Score- 2     Anesthesia Type- epidural with ASA Monitors  Additional Monitors:   Airway Plan:           Plan Factors-Exercise tolerance (METS): >4 METS  Chart reviewed  Existing labs reviewed  Patient summary reviewed  Induction-     Postoperative Plan-     Informed Consent- Anesthetic plan and risks discussed with patient  I personally reviewed this patient with the CRNA  Discussed and agreed on the Anesthesia Plan with the CRNA  Ludy Meyer

## 2021-08-22 NOTE — H&P
H&P Exam - Obstetrics   Ag Young 27 y o  female MRN: [id number]  Unit/Bed#: -01 Encounter: [id number]      ASSESSMENT:  19yo  at 38w2d weeks gestation who is being admitted for IOL for category 2 tracing  EFW: 5lbs 4 oz (68%) @34w  VTX by transabdominal ultrasound     PLAN:    Pregnancy at 38w2d  Admit  Follow up CBC, RPR, Blood Type  Start with Garcia balloon, and pitocin after  GBS status: negative  Analgesia and/or epidural at patient request  Anticipate     Discussed with Dr Noemi Levine      This patient will be an INPATIENT  and I certify the anticipated length of stay is >2 Midnights  History of Present Illness     Chief Complaint: Induction of labor    HPI:  Ag Young is a 27 y o   female with an DRU of 9/3/2021, by Ultrasound at 38w2d weeks gestation who is being admitted for IOL for category 2 tracing  Contractions: no  Loss of fluid: no  Vaginal bleeding: no  Fetal movement: yes    She is SLOGA patient  PREGNANCY COMPLICATINS   1) Anemia-- Hgb on admission 8 9    OB History    Para Term  AB Living   1             SAB TAB Ectopic Multiple Live Births                  # Outcome Date GA Lbr Yunior/2nd Weight Sex Delivery Anes PTL Lv   1 Current                Baby complications/comments: none    Review of Systems   Constitutional: Negative  HENT: Negative  Respiratory: Negative  Cardiovascular: Negative  Gastrointestinal: Positive for abdominal pain  Neurological: Negative            Historical Information   Past Medical History:   Diagnosis Date    Chronic back pain     Multiple abrasions 2013    Proteinuria     Urinary tract infection     pylonephritis     Past Surgical History:   Procedure Laterality Date    TONSILLECTOMY  2009    WISDOM TOOTH EXTRACTION       Social History   Social History     Substance and Sexual Activity   Alcohol Use Not Currently     Social History     Substance and Sexual Activity   Drug Use Not Currently    Types: Marijuana    Comment: medical marijuana card; used before pregnancy     Social History     Tobacco Use   Smoking Status Former Smoker    Quit date: 2020    Years since quittin 7   Smokeless Tobacco Never Used     Family History: non-contributory    Meds/Allergies      Medications Prior to Admission   Medication    ferrous gluconate (FERGON) 324 mg tablet    omeprazole (PriLOSEC) 20 mg delayed release capsule    Prenatal MV-Min-Fe Fum-FA-DHA (PRENATAL+DHA PO)    sertraline (ZOLOFT) 50 mg tablet      No Known Allergies    OBJECTIVE:    Vitals: Blood pressure 128/76, pulse 93, temperature 98 2 °F (36 8 °C), temperature source Oral, resp  rate 18, height 5' 6" (1 676 m), weight 109 kg (240 lb), last menstrual period 2020, SpO2 97 %, not currently breastfeeding  Body mass index is 38 74 kg/m²  Physical Exam  Constitutional:       Appearance: Normal appearance  HENT:      Head: Normocephalic and atraumatic  Mouth/Throat:      Mouth: Mucous membranes are moist    Eyes:      Conjunctiva/sclera: Conjunctivae normal    Cardiovascular:      Rate and Rhythm: Normal rate and regular rhythm  Pulses: Normal pulses  Heart sounds: Normal heart sounds  Pulmonary:      Effort: Pulmonary effort is normal  No respiratory distress  Breath sounds: Normal breath sounds  Abdominal:      Comments: gravid   Skin:     General: Skin is warm and dry  Capillary Refill: Capillary refill takes less than 2 seconds  Neurological:      General: No focal deficit present  Mental Status: She is alert and oriented to person, place, and time  Psychiatric:         Mood and Affect: Mood normal          Behavior: Behavior normal          Thought Content:  Thought content normal            Cervix:  Ft/thick/high    Fetal heart rate:   Baseline Rate: 140 bpm  Variability: Moderate 6-25 bpm  Accelerations: 15 x 15 or greater  Decelerations: None  FHR Category: Category I    [place]o:   Contraction Frequency (minutes): irregular x1  Contraction Duration (seconds): 50  Contraction Quality: Mild    GBS: neg    Prenatal Labs:   Blood Type:   Lab Results   Component Value Date/Time    ABO Grouping B 08/22/2021 11:58 AM     , D (Rh type):   Lab Results   Component Value Date/Time    Rh Factor Positive 08/22/2021 11:58 AM     , Antibody Screen: No results found for: ANTIBODYSCR , HCT/HGB:   Lab Results   Component Value Date/Time    Hematocrit 29 1 (L) 08/22/2021 12:33 AM    Hemoglobin 8 9 (L) 08/22/2021 12:33 AM      , Varicella: No results found for: VARICELLAIGG    , Rubella:   Lab Results   Component Value Date/Time    Rubella IgG Quant 158 0 05/08/2021 10:06 AM        , VDRL/RPR:   Lab Results   Component Value Date/Time    RPR Non-Reactive 06/17/2021 11:05 AM      , Hep B:   Lab Results   Component Value Date/Time    Hepatitis B Surface Ag Non-reactive 05/08/2021 10:06 AM     , Hep C: No components found for: HEPCSAG, EXTHEPCSAG   , HIV:   Lab Results   Component Value Date/Time    HIV-1/HIV-2 Ab Non-Reactive 05/08/2021 10:10 AM     , Chlamydia: No results found for: EXTCHLAMYDIA  , Gonorrhea:   Lab Results   Component Value Date/Time    N GONORRHOEAE, AMPLIFIED DNA NOT DETECTED 03/12/2013 12:00 AM    N gonorrhoeae, DNA Probe Negative 02/26/2021 09:54 AM     , Group B Strep:    Lab Results   Component Value Date/Time    Strep Grp B PCR Negative 08/12/2021 03:39 PM          Invasive Devices     Peripheral Intravenous Line            Peripheral IV 08/22/21 Distal;Left;Ventral (anterior) Forearm <1 day                Leroy Odell MD  PGY-1   8/22/2021  7:33 PM

## 2021-08-22 NOTE — PLAN OF CARE
Problem: BIRTH - VAGINAL/ SECTION  Goal: Fetal and maternal status remain reassuring during the birth process  Description: INTERVENTIONS:  - Monitor vital signs  - Monitor fetal heart rate  - Monitor uterine activity  - Monitor labor progression (vaginal delivery)  - DVT prophylaxis  - Antibiotic prophylaxis  Outcome: Progressing  Goal: Emotionally satisfying birthing experience for mother/fetus  Description: Interventions:  - Assess, plan, implement and evaluate the nursing care given to the patient in labor  - Advocate the philosophy that each childbirth experience is a unique experience and support the family's chosen level of involvement and control during the labor process   - Actively participate in both the patient's and family's teaching of the birth process  - Consider cultural, [employer] and age-specific factors and plan care for the patient in labor  Outcome: Progressing     Problem: Knowledge Deficit  Goal: Verbalizes understanding of labor plan  Description: Assess patient/family/caregiver's baseline knowledge level and ability to understand information  Provide education via patient/family/caregiver's preferred learning method at appropriate level of understanding  1  Provide teaching at level of understanding  2  Provide teaching via preferred learning method(s)  Outcome: Progressing     Problem: Labor & Delivery  Goal: Manages discomfort  Description: Assess and monitor for signs and symptoms of discomfort  Assess patient's pain level regularly and per hospital policy  Administer medications as ordered  Support use of nonpharmacological methods to help control pain such as distraction, imagery, relaxation, and application of heat and cold  Collaborate with interdisciplinary team and patient to determine appropriate pain management plan  1  Include patient in decisions related to comfort  2  Offer non-pharmacological pain management interventions    3  Report ineffective pain management to physician  Outcome: Progressing  Goal: Patient vital signs are stable  Description: 1  Assess vital signs - vaginal delivery    Outcome: Progressing

## 2021-08-22 NOTE — PROGRESS NOTES
L&D Triage Note - OB/GYN  Makayla Baker 27 y o  female MRN: [id number]  Unit/Bed#: LD TRIAGE 2 Encounter: [id number]      ASSESSMENT:    Makayla Baker is a 27 y o   at 36w4d who presents for unresolved headache and vision changes    PLAN:    1) HA+Vision changes  - Normotensive throughout pregnancy and within triage; 99//77  - Patient denies RUQ pain or labored breathing  - Reports unresolved headache and intermittent spotting in vision s/p tylenol, s/p fioricet  - Currently receiving IV fluids and migraine cocktail (2g Mg, 500mg Solumedrol, 10mg Reglan)  - Defer to day team for disposition    2) Proteinuria  - Patient expresses proteinuria on pre-eclamptic labs, P:C 0 47 with UA 30+ protein  - CBC/CMP WNL  - Patient does not meet pre-eclampsia criteria    3) Consider discharge vs  observation   - 2x variable decelerations, since resolved, category 1 FHT    - Reviewed rupture of membranes, false vs true labor, decreased fetal movement, and vaginal bleeding   - Continue routine prenatal care   - Case discussed with Dr Valentin Arshad & Dr Checo Mendes:    Makayla Baker 27 y o   at 38w2d with an Estimated Date of Delivery: 9/3/21 who presents for unresolved headache  Patient reports having a headache for the past couple days that had previously resolved from the night prior  Endorses mild photosensitivity and vision spotting  Denies RUQ pain and labored breathing  Denies elevated pressures during this pregnancy or prior  Patient has not tried any medications for pain relief   No other acute concerns    Her current obstetrical history is significant for anemia    Contractions: none  Leakage of fluid: none  Vaginal Bleeding: none  Fetal movement: present    OBJECTIVE:    Vitals:    21 0005   BP: 99/56   Pulse: 99   Resp:    Temp:    SpO2:        ROS:  Constitutional: Negative  Respiratory: Negative  Cardiovascular: Negative    Gastrointestinal: Negative    General Physical Exam:  General: in no apparent distress and well developed and well nourished  Cardiovascular: Cor RRR  Lungs: non-labored breathing  Abdomen: abdomen is soft without significant tenderness, masses, organomegaly or guarding  Lower extremeties: nontender  Reflexes:  WNL  Vision: Appropriate visual field assessment    Fetal monitoring:  FHT:  115 bpm/ Moderate 6 - 25 bpm / 15 x 15 accelerations present, 2x variable decelerations  [place]: irritability        Milvia Marie MD  OBGYN PGY-1  8/22/2021 12:25 AM

## 2021-08-22 NOTE — TELEPHONE ENCOUNTER
Regardin months pregnant, going to the hospital  ----- Message from Giovany Morgan sent at 2021 10:43 PM EDT -----  "I am 9 months pregnant and I am on my way to the hospital and I was told to call this number is anything "

## 2021-08-23 ENCOUNTER — ANESTHESIA EVENT (INPATIENT)
Dept: LABOR AND DELIVERY | Facility: HOSPITAL | Age: 26
End: 2021-08-23
Payer: COMMERCIAL

## 2021-08-23 ENCOUNTER — ANESTHESIA (INPATIENT)
Dept: LABOR AND DELIVERY | Facility: HOSPITAL | Age: 26
End: 2021-08-23
Payer: COMMERCIAL

## 2021-08-23 LAB
AMPHETAMINES SERPL QL SCN: NEGATIVE
APTT PPP: 27 SECONDS (ref 23–37)
BARBITURATES UR QL: POSITIVE
BASE EXCESS BLDA CALC-SCNC: -7 MMOL/L (ref -2–3)
BASE EXCESS BLDCOV CALC-SCNC: -4.6 MMOL/L (ref 1–9)
BENZODIAZ UR QL: NEGATIVE
COCAINE UR QL: NEGATIVE
ERYTHROCYTE [DISTWIDTH] IN BLOOD BY AUTOMATED COUNT: 14.7 % (ref 11.6–15.1)
ERYTHROCYTE [DISTWIDTH] IN BLOOD BY AUTOMATED COUNT: 14.9 % (ref 11.6–15.1)
FIBRINOGEN PPP-MCNC: 428 MG/DL (ref 227–495)
GLUCOSE SERPL-MCNC: 86 MG/DL (ref 65–140)
HCO3 BLDA-SCNC: 18.3 MMOL/L (ref 22–28)
HCO3 BLDCOV-SCNC: 19.1 MMOL/L (ref 12.2–28.6)
HCT VFR BLD AUTO: 25.6 % (ref 34.8–46.1)
HCT VFR BLD AUTO: 26.9 % (ref 34.8–46.1)
HCT VFR BLD CALC: 22 % (ref 34.8–46.1)
HGB BLD-MCNC: 7.8 G/DL (ref 11.5–15.4)
HGB BLD-MCNC: 8 G/DL (ref 11.5–15.4)
HGB BLDA-MCNC: 7.5 G/DL (ref 11.5–15.4)
INR PPP: 1.05 (ref 0.84–1.19)
MCH RBC QN AUTO: 23.7 PG (ref 26.8–34.3)
MCH RBC QN AUTO: 24.1 PG (ref 26.8–34.3)
MCHC RBC AUTO-ENTMCNC: 29.7 G/DL (ref 31.4–37.4)
MCHC RBC AUTO-ENTMCNC: 30.5 G/DL (ref 31.4–37.4)
MCV RBC AUTO: 79 FL (ref 82–98)
MCV RBC AUTO: 80 FL (ref 82–98)
METHADONE UR QL: NEGATIVE
OPIATES UR QL SCN: NEGATIVE
OXYCODONE+OXYMORPHONE UR QL SCN: NEGATIVE
OXYHGB MFR BLDCOV: 80.4 %
PCO2 BLD: 19 MMOL/L (ref 21–32)
PCO2 BLD: 32.3 MM HG (ref 36–44)
PCO2 BLDCOV: 32.2 MM HG (ref 27–43)
PCP UR QL: NEGATIVE
PH BLD: 7.36 [PH] (ref 7.35–7.45)
PH BLDCOV: 7.39 [PH] (ref 7.19–7.49)
PLATELET # BLD AUTO: 174 THOUSANDS/UL (ref 149–390)
PLATELET # BLD AUTO: 221 THOUSANDS/UL (ref 149–390)
PMV BLD AUTO: 12.4 FL (ref 8.9–12.7)
PMV BLD AUTO: 12.8 FL (ref 8.9–12.7)
PO2 BLD: 118 MM HG (ref 75–129)
PO2 BLDCOV: 34.8 MM HG (ref 15–45)
POTASSIUM BLD-SCNC: 4 MMOL/L (ref 3.5–5.3)
PROTHROMBIN TIME: 13.8 SECONDS (ref 11.6–14.5)
RBC # BLD AUTO: 3.23 MILLION/UL (ref 3.81–5.12)
RBC # BLD AUTO: 3.38 MILLION/UL (ref 3.81–5.12)
RPR SER QL: NORMAL
SAO2 % BLD FROM PO2: 98 % (ref 60–85)
SAO2 % BLDCOV: 19.2 ML/DL
SODIUM BLD-SCNC: 136 MMOL/L (ref 136–145)
SPECIMEN SOURCE: ABNORMAL
THC UR QL: NEGATIVE
WBC # BLD AUTO: 16.54 THOUSAND/UL (ref 4.31–10.16)
WBC # BLD AUTO: 18.49 THOUSAND/UL (ref 4.31–10.16)

## 2021-08-23 PROCEDURE — 94760 N-INVAS EAR/PLS OXIMETRY 1: CPT

## 2021-08-23 PROCEDURE — 94762 N-INVAS EAR/PLS OXIMTRY CONT: CPT

## 2021-08-23 PROCEDURE — 88307 TISSUE EXAM BY PATHOLOGIST: CPT | Performed by: PATHOLOGY

## 2021-08-23 PROCEDURE — 85027 COMPLETE CBC AUTOMATED: CPT

## 2021-08-23 PROCEDURE — 85730 THROMBOPLASTIN TIME PARTIAL: CPT | Performed by: ANESTHESIOLOGY

## 2021-08-23 PROCEDURE — 80307 DRUG TEST PRSMV CHEM ANLYZR: CPT | Performed by: STUDENT IN AN ORGANIZED HEALTH CARE EDUCATION/TRAINING PROGRAM

## 2021-08-23 PROCEDURE — 84295 ASSAY OF SERUM SODIUM: CPT

## 2021-08-23 PROCEDURE — 82803 BLOOD GASES ANY COMBINATION: CPT

## 2021-08-23 PROCEDURE — 82805 BLOOD GASES W/O2 SATURATION: CPT | Performed by: STUDENT IN AN ORGANIZED HEALTH CARE EDUCATION/TRAINING PROGRAM

## 2021-08-23 PROCEDURE — 10907ZC DRAINAGE OF AMNIOTIC FLUID, THERAPEUTIC FROM PRODUCTS OF CONCEPTION, VIA NATURAL OR ARTIFICIAL OPENING: ICD-10-PCS | Performed by: OBSTETRICS & GYNECOLOGY

## 2021-08-23 PROCEDURE — 82947 ASSAY GLUCOSE BLOOD QUANT: CPT

## 2021-08-23 PROCEDURE — 10D17ZZ EXTRACTION OF PRODUCTS OF CONCEPTION, RETAINED, VIA NATURAL OR ARTIFICIAL OPENING: ICD-10-PCS | Performed by: OBSTETRICS & GYNECOLOGY

## 2021-08-23 PROCEDURE — 85027 COMPLETE CBC AUTOMATED: CPT | Performed by: ANESTHESIOLOGY

## 2021-08-23 PROCEDURE — 99024 POSTOP FOLLOW-UP VISIT: CPT | Performed by: OBSTETRICS & GYNECOLOGY

## 2021-08-23 PROCEDURE — 59160 D & C AFTER DELIVERY: CPT | Performed by: OBSTETRICS & GYNECOLOGY

## 2021-08-23 PROCEDURE — 88305 TISSUE EXAM BY PATHOLOGIST: CPT | Performed by: PATHOLOGY

## 2021-08-23 PROCEDURE — 85610 PROTHROMBIN TIME: CPT | Performed by: ANESTHESIOLOGY

## 2021-08-23 PROCEDURE — 84132 ASSAY OF SERUM POTASSIUM: CPT

## 2021-08-23 PROCEDURE — 59400 OBSTETRICAL CARE: CPT | Performed by: OBSTETRICS & GYNECOLOGY

## 2021-08-23 PROCEDURE — 0W3R7ZZ CONTROL BLEEDING IN GENITOURINARY TRACT, VIA NATURAL OR ARTIFICIAL OPENING: ICD-10-PCS | Performed by: OBSTETRICS & GYNECOLOGY

## 2021-08-23 PROCEDURE — 0UQMXZZ REPAIR VULVA, EXTERNAL APPROACH: ICD-10-PCS | Performed by: OBSTETRICS & GYNECOLOGY

## 2021-08-23 PROCEDURE — 85014 HEMATOCRIT: CPT

## 2021-08-23 PROCEDURE — 10H07YZ INSERTION OF OTHER DEVICE INTO PRODUCTS OF CONCEPTION, VIA NATURAL OR ARTIFICIAL OPENING: ICD-10-PCS | Performed by: OBSTETRICS & GYNECOLOGY

## 2021-08-23 PROCEDURE — 85384 FIBRINOGEN ACTIVITY: CPT | Performed by: ANESTHESIOLOGY

## 2021-08-23 RX ORDER — METHYLERGONOVINE MALEATE 0.2 MG/ML
0.2 INJECTION INTRAVENOUS ONCE
Status: COMPLETED | OUTPATIENT
Start: 2021-08-23 | End: 2021-08-23

## 2021-08-23 RX ORDER — METHYLERGONOVINE MALEATE 0.2 MG/ML
INJECTION INTRAVENOUS
Status: COMPLETED
Start: 2021-08-23 | End: 2021-08-23

## 2021-08-23 RX ORDER — HYDROMORPHONE HCL/PF 1 MG/ML
0.5 SYRINGE (ML) INJECTION ONCE
Status: COMPLETED | OUTPATIENT
Start: 2021-08-23 | End: 2021-08-23

## 2021-08-23 RX ORDER — DEXAMETHASONE SODIUM PHOSPHATE 10 MG/ML
INJECTION, SOLUTION INTRAMUSCULAR; INTRAVENOUS AS NEEDED
Status: DISCONTINUED | OUTPATIENT
Start: 2021-08-23 | End: 2021-08-23

## 2021-08-23 RX ORDER — CALCIUM CARBONATE 200(500)MG
1000 TABLET,CHEWABLE ORAL 3 TIMES DAILY PRN
Status: DISCONTINUED | OUTPATIENT
Start: 2021-08-23 | End: 2021-08-25 | Stop reason: HOSPADM

## 2021-08-23 RX ORDER — METHYLERGONOVINE MALEATE 0.2 MG/ML
INJECTION INTRAVENOUS AS NEEDED
Status: DISCONTINUED | OUTPATIENT
Start: 2021-08-23 | End: 2021-08-23

## 2021-08-23 RX ORDER — DOCUSATE SODIUM 100 MG/1
100 CAPSULE, LIQUID FILLED ORAL 2 TIMES DAILY
Status: DISCONTINUED | OUTPATIENT
Start: 2021-08-23 | End: 2021-08-25 | Stop reason: HOSPADM

## 2021-08-23 RX ORDER — FENTANYL CITRATE 50 UG/ML
INJECTION, SOLUTION INTRAMUSCULAR; INTRAVENOUS AS NEEDED
Status: DISCONTINUED | OUTPATIENT
Start: 2021-08-23 | End: 2021-08-23

## 2021-08-23 RX ORDER — DIAPER,BRIEF,INFANT-TODD,DISP
1 EACH MISCELLANEOUS 4 TIMES DAILY PRN
Status: DISCONTINUED | OUTPATIENT
Start: 2021-08-23 | End: 2021-08-25 | Stop reason: HOSPADM

## 2021-08-23 RX ORDER — LIDOCAINE HYDROCHLORIDE 10 MG/ML
INJECTION, SOLUTION EPIDURAL; INFILTRATION; INTRACAUDAL; PERINEURAL
Status: COMPLETED
Start: 2021-08-23 | End: 2021-08-23

## 2021-08-23 RX ORDER — BUPIVACAINE HYDROCHLORIDE 2.5 MG/ML
INJECTION, SOLUTION EPIDURAL; INFILTRATION; INTRACAUDAL AS NEEDED
Status: DISCONTINUED | OUTPATIENT
Start: 2021-08-23 | End: 2021-08-23 | Stop reason: HOSPADM

## 2021-08-23 RX ORDER — SODIUM CHLORIDE, SODIUM LACTATE, POTASSIUM CHLORIDE, CALCIUM CHLORIDE 600; 310; 30; 20 MG/100ML; MG/100ML; MG/100ML; MG/100ML
INJECTION, SOLUTION INTRAVENOUS CONTINUOUS PRN
Status: DISCONTINUED | OUTPATIENT
Start: 2021-08-23 | End: 2021-08-23

## 2021-08-23 RX ORDER — MISOPROSTOL 100 UG/1
TABLET ORAL AS NEEDED
Status: DISCONTINUED | OUTPATIENT
Start: 2021-08-23 | End: 2021-08-23 | Stop reason: HOSPADM

## 2021-08-23 RX ORDER — ONDANSETRON 2 MG/ML
INJECTION INTRAMUSCULAR; INTRAVENOUS AS NEEDED
Status: DISCONTINUED | OUTPATIENT
Start: 2021-08-23 | End: 2021-08-23

## 2021-08-23 RX ORDER — ACETAMINOPHEN 325 MG/1
650 TABLET ORAL EVERY 6 HOURS PRN
Status: DISCONTINUED | OUTPATIENT
Start: 2021-08-23 | End: 2021-08-25 | Stop reason: HOSPADM

## 2021-08-23 RX ORDER — IBUPROFEN 600 MG/1
600 TABLET ORAL EVERY 6 HOURS PRN
Status: DISCONTINUED | OUTPATIENT
Start: 2021-08-23 | End: 2021-08-25 | Stop reason: HOSPADM

## 2021-08-23 RX ORDER — PROPOFOL 10 MG/ML
INJECTION, EMULSION INTRAVENOUS AS NEEDED
Status: DISCONTINUED | OUTPATIENT
Start: 2021-08-23 | End: 2021-08-23

## 2021-08-23 RX ORDER — PROPOFOL 10 MG/ML
INJECTION, EMULSION INTRAVENOUS CONTINUOUS PRN
Status: DISCONTINUED | OUTPATIENT
Start: 2021-08-23 | End: 2021-08-23

## 2021-08-23 RX ORDER — SUCCINYLCHOLINE/SOD CL,ISO/PF 100 MG/5ML
SYRINGE (ML) INTRAVENOUS AS NEEDED
Status: DISCONTINUED | OUTPATIENT
Start: 2021-08-23 | End: 2021-08-23

## 2021-08-23 RX ORDER — OXYCODONE HYDROCHLORIDE AND ACETAMINOPHEN 5; 325 MG/1; MG/1
1 TABLET ORAL EVERY 4 HOURS PRN
Status: DISCONTINUED | OUTPATIENT
Start: 2021-08-23 | End: 2021-08-25 | Stop reason: HOSPADM

## 2021-08-23 RX ORDER — SODIUM CHLORIDE 9 MG/ML
INJECTION, SOLUTION INTRAVENOUS CONTINUOUS PRN
Status: DISCONTINUED | OUTPATIENT
Start: 2021-08-23 | End: 2021-08-23

## 2021-08-23 RX ORDER — ONDANSETRON 2 MG/ML
4 INJECTION INTRAMUSCULAR; INTRAVENOUS EVERY 8 HOURS PRN
Status: DISCONTINUED | OUTPATIENT
Start: 2021-08-23 | End: 2021-08-25 | Stop reason: HOSPADM

## 2021-08-23 RX ORDER — SIMETHICONE 80 MG
80 TABLET,CHEWABLE ORAL 4 TIMES DAILY PRN
Status: DISCONTINUED | OUTPATIENT
Start: 2021-08-23 | End: 2021-08-25 | Stop reason: HOSPADM

## 2021-08-23 RX ORDER — SODIUM CHLORIDE, SODIUM LACTATE, POTASSIUM CHLORIDE, CALCIUM CHLORIDE 600; 310; 30; 20 MG/100ML; MG/100ML; MG/100ML; MG/100ML
125 INJECTION, SOLUTION INTRAVENOUS CONTINUOUS
Status: DISCONTINUED | OUTPATIENT
Start: 2021-08-23 | End: 2021-08-24

## 2021-08-23 RX ORDER — CLONIDINE 100 UG/ML
INJECTION, SOLUTION EPIDURAL AS NEEDED
Status: DISCONTINUED | OUTPATIENT
Start: 2021-08-23 | End: 2021-08-23 | Stop reason: HOSPADM

## 2021-08-23 RX ORDER — HYDROMORPHONE HCL/PF 1 MG/ML
0.5 SYRINGE (ML) INJECTION EVERY 4 HOURS PRN
Status: DISCONTINUED | OUTPATIENT
Start: 2021-08-23 | End: 2021-08-25 | Stop reason: HOSPADM

## 2021-08-23 RX ORDER — ACETAMINOPHEN 325 MG/1
650 TABLET ORAL EVERY 6 HOURS PRN
Status: DISCONTINUED | OUTPATIENT
Start: 2021-08-23 | End: 2021-08-23

## 2021-08-23 RX ORDER — FENTANYL CITRATE/PF 50 MCG/ML
50 SYRINGE (ML) INJECTION
Status: COMPLETED | OUTPATIENT
Start: 2021-08-23 | End: 2021-08-23

## 2021-08-23 RX ORDER — DIPHENHYDRAMINE HYDROCHLORIDE 50 MG/ML
25 INJECTION INTRAMUSCULAR; INTRAVENOUS EVERY 6 HOURS PRN
Status: DISCONTINUED | OUTPATIENT
Start: 2021-08-23 | End: 2021-08-25 | Stop reason: HOSPADM

## 2021-08-23 RX ORDER — LIDOCAINE HYDROCHLORIDE 10 MG/ML
10 INJECTION, SOLUTION EPIDURAL; INFILTRATION; INTRACAUDAL; PERINEURAL ONCE
Status: COMPLETED | OUTPATIENT
Start: 2021-08-23 | End: 2021-08-23

## 2021-08-23 RX ORDER — OXYTOCIN/RINGER'S LACTATE 30/500 ML
PLASTIC BAG, INJECTION (ML) INTRAVENOUS AS NEEDED
Status: DISCONTINUED | OUTPATIENT
Start: 2021-08-23 | End: 2021-08-23

## 2021-08-23 RX ORDER — OXYTOCIN/RINGER'S LACTATE 30/500 ML
PLASTIC BAG, INJECTION (ML) INTRAVENOUS
Status: COMPLETED
Start: 2021-08-23 | End: 2021-08-23

## 2021-08-23 RX ORDER — ONDANSETRON 2 MG/ML
4 INJECTION INTRAMUSCULAR; INTRAVENOUS ONCE AS NEEDED
Status: DISCONTINUED | OUTPATIENT
Start: 2021-08-23 | End: 2021-08-25 | Stop reason: HOSPADM

## 2021-08-23 RX ADMIN — FENTANYL CITRATE 50 MCG: 50 INJECTION INTRAMUSCULAR; INTRAVENOUS at 20:05

## 2021-08-23 RX ADMIN — WITCH HAZEL 1 PAD: 500 SOLUTION RECTAL; TOPICAL at 22:15

## 2021-08-23 RX ADMIN — FENTANYL CITRATE 25 MCG: 50 INJECTION, SOLUTION INTRAMUSCULAR; INTRAVENOUS at 18:53

## 2021-08-23 RX ADMIN — SODIUM CHLORIDE, SODIUM LACTATE, POTASSIUM CHLORIDE, AND CALCIUM CHLORIDE: .6; .31; .03; .02 INJECTION, SOLUTION INTRAVENOUS at 18:30

## 2021-08-23 RX ADMIN — DEXAMETHASONE SODIUM PHOSPHATE 4 MG: 10 INJECTION, SOLUTION INTRAMUSCULAR; INTRAVENOUS at 18:51

## 2021-08-23 RX ADMIN — METHYLERGONOVINE MALEATE 0.2 MG: 0.2 INJECTION INTRAVENOUS at 17:13

## 2021-08-23 RX ADMIN — Medication 250 MILLI-UNITS/MIN: at 17:39

## 2021-08-23 RX ADMIN — OXYCODONE HYDROCHLORIDE AND ACETAMINOPHEN 1 TABLET: 5; 325 TABLET ORAL at 23:33

## 2021-08-23 RX ADMIN — CLONIDINE 100 MCG: 100 INJECTION, SOLUTION EPIDURAL at 12:40

## 2021-08-23 RX ADMIN — HYDROMORPHONE HYDROCHLORIDE 0.5 MG: 1 INJECTION, SOLUTION INTRAMUSCULAR; INTRAVENOUS; SUBCUTANEOUS at 22:09

## 2021-08-23 RX ADMIN — CLONIDINE 100 MCG: 100 INJECTION, SOLUTION EPIDURAL at 09:13

## 2021-08-23 RX ADMIN — TRANEXAMIC ACID 1000 MG: 1 INJECTION, SOLUTION INTRAVENOUS at 17:18

## 2021-08-23 RX ADMIN — FENTANYL CITRATE 50 MCG: 50 INJECTION, SOLUTION INTRAMUSCULAR; INTRAVENOUS at 18:38

## 2021-08-23 RX ADMIN — IBUPROFEN 600 MG: 600 TABLET ORAL at 17:13

## 2021-08-23 RX ADMIN — METHYLERGONOVINE MALEATE 0.2 MG: 0.2 INJECTION INTRAVENOUS at 19:05

## 2021-08-23 RX ADMIN — ROPIVACAINE HYDROCHLORIDE: 2 INJECTION, SOLUTION EPIDURAL; INFILTRATION at 12:54

## 2021-08-23 RX ADMIN — BUPIVACAINE HYDROCHLORIDE 5 ML: 2.5 INJECTION, SOLUTION EPIDURAL; INFILTRATION; INTRACAUDAL; PERINEURAL at 09:13

## 2021-08-23 RX ADMIN — BENZOCAINE AND LEVOMENTHOL: 200; 5 SPRAY TOPICAL at 22:15

## 2021-08-23 RX ADMIN — SODIUM CHLORIDE, SODIUM LACTATE, POTASSIUM CHLORIDE, AND CALCIUM CHLORIDE 125 ML/HR: .6; .31; .03; .02 INJECTION, SOLUTION INTRAVENOUS at 03:51

## 2021-08-23 RX ADMIN — Medication 250 MILLI-UNITS/MIN: at 18:33

## 2021-08-23 RX ADMIN — SODIUM CHLORIDE 3 G: 9 INJECTION, SOLUTION INTRAVENOUS at 15:44

## 2021-08-23 RX ADMIN — ACETAMINOPHEN 650 MG: 325 TABLET, FILM COATED ORAL at 15:32

## 2021-08-23 RX ADMIN — FENTANYL CITRATE 50 MCG: 50 INJECTION INTRAMUSCULAR; INTRAVENOUS at 20:46

## 2021-08-23 RX ADMIN — Medication 50 MG: at 18:39

## 2021-08-23 RX ADMIN — LIDOCAINE HYDROCHLORIDE 10 ML: 10 INJECTION, SOLUTION EPIDURAL; INFILTRATION; INTRACAUDAL; PERINEURAL at 17:35

## 2021-08-23 RX ADMIN — BUPIVACAINE HYDROCHLORIDE 5 ML: 2.5 INJECTION, SOLUTION EPIDURAL; INFILTRATION; INTRACAUDAL; PERINEURAL at 12:40

## 2021-08-23 RX ADMIN — PHENYLEPHRINE HYDROCHLORIDE 40 MCG/MIN: 10 INJECTION INTRAVENOUS at 19:10

## 2021-08-23 RX ADMIN — Medication 500 MILLI-UNITS/MIN: at 16:23

## 2021-08-23 RX ADMIN — ONDANSETRON 4 MG: 2 INJECTION INTRAMUSCULAR; INTRAVENOUS at 18:51

## 2021-08-23 RX ADMIN — SODIUM CHLORIDE 3 G: 9 INJECTION, SOLUTION INTRAVENOUS at 22:15

## 2021-08-23 RX ADMIN — ONDANSETRON 4 MG: 2 INJECTION INTRAMUSCULAR; INTRAVENOUS at 07:06

## 2021-08-23 RX ADMIN — SODIUM CHLORIDE, SODIUM LACTATE, POTASSIUM CHLORIDE, AND CALCIUM CHLORIDE 125 ML/HR: .6; .31; .03; .02 INJECTION, SOLUTION INTRAVENOUS at 11:32

## 2021-08-23 RX ADMIN — ROPIVACAINE HYDROCHLORIDE: 2 INJECTION, SOLUTION EPIDURAL; INFILTRATION at 07:53

## 2021-08-23 RX ADMIN — PROPOFOL 200 MG: 10 INJECTION, EMULSION INTRAVENOUS at 18:38

## 2021-08-23 RX ADMIN — HYDROMORPHONE HYDROCHLORIDE 0.5 MG: 1 INJECTION, SOLUTION INTRAMUSCULAR; INTRAVENOUS; SUBCUTANEOUS at 17:35

## 2021-08-23 RX ADMIN — SODIUM CHLORIDE, SODIUM LACTATE, POTASSIUM CHLORIDE, AND CALCIUM CHLORIDE 125 ML/HR: .6; .31; .03; .02 INJECTION, SOLUTION INTRAVENOUS at 00:11

## 2021-08-23 RX ADMIN — SODIUM CHLORIDE: 0.9 INJECTION, SOLUTION INTRAVENOUS at 19:06

## 2021-08-23 RX ADMIN — ROPIVACAINE HYDROCHLORIDE: 2 INJECTION, SOLUTION EPIDURAL; INFILTRATION at 13:48

## 2021-08-23 NOTE — L&D DELIVERY NOTE
Vaginal Delivery Summary - OB/GYN   Amanda Guzman 27 y o  female MRN: [id number]  Unit/Bed#: LD OR 2 Encounter: [id number]          Predelivery Diagnosis:  1  Pregnancy at 38w3d  2  Anxiety and Depresssion    Postdelivery Diagnosis:  1  Same as above  2  Delivery of term     Procedure: Spontaneous Vaginal Delivery, repair of left labial laceration    Attending: Erlinda Oates    Assistant: Linette    Anesthesia: Epidural    QBL: 895cc  Admission H 9  Admission platelets: 838    Complications: none apparent    Specimens: cord blood, arterial and venous cord blood gasses, placenta to pathology    Findings:   1  Viable female at [phone], with APGARS of 8 and 9 at 1 and 5 minutes respectively,  2  Spontaneous delivery of intact placenta   3  Left labial laceration repaired with 3-0 Vicryl Rapide  4  Blood gases:    Umbilical Cord Venous Blood Gas:  Results from last 7 days   Lab Units 21  1620   PH COV  7 390   PCO2 COV mm HG 32 2   HCO3 COV mmol/L 19 1   BASE EXC COV mmol/L -4 6*   O2 CT CD VB mL/dL 19 2   O2 HGB, VENOUS CORD % 17 9     Umbilical Cord Arterial Blood Gas:        Disposition:  Patient tolerated the procedure well and was recovering in labor and delivery room     Brief history and labor course:  Ms Amanda Guzman is a 27 y o   at 43wk2d  She was admitted to labor and delivery for IOL for category II tracing  She was ft/thick/high at admission and a may balloon was placed  She was started on low dose pitocin and titrated up  She received an epidural for pain control  She then progressed to 10/100/0 and began pushing  She developed a fever to 100 9 after and was started on Unasyn IV for Intrapartum Intramniotic Infection  She delivered a female  via   Description of procedure    Patient delivered a viable female , weighing 7 lbs 15 9 oz apgars of 8 (1 min) and 9 (5 min)  The fetal vertex delivered spontaneously  Baby was checked for nuchal and cord was noted  The anterior shoulder delivered atraumatically with maternal expulsive forces and the assistance of downward traction  The posterior shoulder delivered with maternal expulsive forces and the assistance of upward traction  The remainder of the fetus delivered spontaneously  Upon delivery, the infant was placed on the mothers abdomen and the cord was clamped and cut  Delayed cord clamping was performed  The infant was noted to cry spontaneously and was moving all extremities appropriately  There was no evidence for injury  Awaiting nurse resuscitators evaluated the   Venous cord blood gases and cord blood was collected for analysis  These were promptly sent to the lab  In the immediate post-partum, there was juan blood noted and 30 units of IV pitocin was administered  The uterus was noted to contract down well with massage and pitocin  The placenta delivered spontaneously at 1613 and was noted to have a centrally inserted 3 vessel cord  The vagina, cervix, perineum, and rectum were inspected and there was noted to be a left labial laceration repaired with 3-0 Vicryl Rapide  At the conclusion of the procedure, all needle, sponge, and instrument counts were noted to be correct  Patient tolerated the procedure well and was allowed to recover in labor and delivery room with family and  before being transferred to the post-partum floor  Dr Dana Fuller was present and participated in all key portions of the case      Iman Ojeda MD  2021  6:41 PM

## 2021-08-23 NOTE — OB LABOR/OXYTOCIN SAFETY PROGRESS
Oxytocin Safety Progress Check Note - Giovany Oglesby 27 y o  female MRN: [id number]    Unit/Bed#: -01 Encounter: [id number]    Dose (hoa-units/min) Oxytocin: 28 hoa-units/min  Contraction Frequency (minutes): 2-3  Contraction Quality: Moderate  Tachysystole: No   Cervical Dilation: 4-5        Cervical Effacement: 80  Fetal Station: -2  Baseline Rate: 140 bpm  Fetal Heart Rate: 125 BPM  FHR Category: Category I               Vital Signs:   Vitals:    08/23/21 1058   BP:    Pulse:    Resp:    Temp: 99 3 °F (37 4 °C)   SpO2:            Notes/comments:    SVE unchanged, 4 5/80/-2  IUPC placed to help guide pitocin in usage  Pit at 28mU/min  FHT category 1  Continue pitocin titration as tolerated  Dr Berny Sheridan aware      Dominick Mckeon MD 8/23/2021 11:10 AM

## 2021-08-23 NOTE — ANESTHESIA PREPROCEDURE EVALUATION
Procedure:  DILATATION AND CURETTAGE (D&C) (N/A Uterus)    Relevant Problems   GYN   (+) 38 weeks gestation of pregnancy   (+) Encounter for supervision of normal first pregnancy in third trimester      HEMATOLOGY   (+) Anemia during pregnancy in third trimester      MUSCULOSKELETAL   (+) Back strain   (+) Scoliosis deformity of spine      NEURO/PSYCH   (+) Anxiety and depression             Anesthesia Plan  ASA Score- 2 Emergent    Anesthesia Type- general with ASA Monitors  Additional Monitors:   Airway Plan: ETT  Comment: General anesthesia, endotracheal tube; standard ASA monitors  Risks and benefits discussed with patient; patient consented and agrees to proceed  I saw and evaluated the patient  If seen with CRNA, we have discussed the anesthetic plan and I am in agreement that the plan is appropriate for the patient  Surgeon requests GETA; pt with post-patum bleeding  Plan Factors-    Chart reviewed  Imaging results reviewed  Existing labs reviewed  Induction- intravenous  Postoperative Plan- Plan for postoperative opioid use  Planned trial extubation    Informed Consent- Anesthetic plan and risks discussed with patient  I personally reviewed this patient with the CRNA  Discussed and agreed on the Anesthesia Plan with the CRNA  Zakia Patiño

## 2021-08-23 NOTE — DISCHARGE SUMMARY
Discharge Summary - Lisa Lino 27 y o  female MRN: [id number]    Unit/Bed#: -01 Encounter: [id number]    Admission Date: 2021     Discharge Date: 21    Admitting Attending: Jacobo Sanchez   Delivering Attending: Tatum Mandujano  Discharge Attending: Joya Sy    Diagnosis:       Procedures: Spontaneous Vaginal Delivery    Complications: none apparent    Hospital Course: Ms Lisa Lino is a 27 y o   at 43wk2d  She was admitted to labor and delivery for IOL for category II tracing  She was ft/thick/high at admission and a may balloon was placed  She was started on low dose pitocin and titrated up  She received an epidural for pain control  She was AROM'd  She then progressed to 10/100/0 and began pushing  She developed a fever to 100 9 after and was started on Unasyn IV for Intrapartum Intramniotic Infection  She delivered a female  via   Her post-partum course was complicated by PPH and was given Methergine x1, TXA, cytotec, and D/C was performed  A Bakri balloon was placed and was removed on PPD 1  She was noted to have a Hgb as low as 6 7 for which she was given 2u pRBCs, after which Hgb increased to 9 0  Her bleeding was minimal and she had no symptoms on the day of discharge  She was continued on Unasyn for 24 hours after D/C  Condition at discharge: good     On day of discharge, patient was tolerating PO, passing flatus, urinating, and ambulating  Her pain was well controlled with oral analgesics  She was discharged home on postpartum day #2 with standard post partum instructions to follow up with her physician in 3-6 weeks for a postpartum appointment  Discharge instructions/Information to patient and family:   - Do not place anything (no partner, tampons or douche) in your vagina for 6 weeks    -You may walk for exercise for the first 6 weeks then gradually return to your usual activities    -Please do not drive for 1 week if you have no stitches and for 2 weeks if you have stitches or underwent a  delivery     -You may take baths or shower per your preference    -Please look at your bust (breasts) in the mirror daily and call for redness or tenderness or increased warmth    -Please call us for temperature > 100 4*F or 38* C, worsening pain or a foul discharge  Discharge Medications:   Prenatal vitamin daily for 6 months or the duration of nursing whichever is longer  Motrin 600 mg orally every 6 hours as needed for pain  Tylenol (over the counter) per bottle directions as needed for pain: do NOT use with percocet  Hydrocortisone cream 1% (over the counter) applied 1-2x daily to hemorrhoids as needed    Provisions for Follow-Up Care: Follow up with your doctor in 3 weeks for postpartum care       Planned Readmission: No

## 2021-08-23 NOTE — OB LABOR/OXYTOCIN SAFETY PROGRESS
Oxytocin Safety Progress Check Note - Get Grover 27 y o  female MRN: [id number]    Unit/Bed#: -01 Encounter: [id number]    Dose (hoa-units/min) Oxytocin: 26 hoa-units/min  Contraction Frequency (minutes): 2-3  Contraction Quality: Moderate  Tachysystole: No   Cervical Dilation: 5        Cervical Effacement: 70  Fetal Station: -2  Baseline Rate: 125 bpm  Fetal Heart Rate: 125 BPM  FHR Category: Category I               Vital Signs:   Vitals:    08/23/21 0730   BP: 98/55   Pulse: 88   Resp: 16   Temp: 98 1 °F (36 7 °C)   SpO2:            Notes/comments: AROM for clear fluid without complication, fetal head well applied to cervix, some cervical change noted, strip remains category 1 but contractions difficult to monitor with Katherine Alexis MD 8/23/2021 7:37 AM

## 2021-08-23 NOTE — PLAN OF CARE
Problem: BIRTH - VAGINAL/ SECTION  Goal: Fetal and maternal status remain reassuring during the birth process  Description: INTERVENTIONS:  - Monitor vital signs  - Monitor fetal heart rate  - Monitor uterine activity  - Monitor labor progression (vaginal delivery)  - DVT prophylaxis  - Antibiotic prophylaxis  Outcome: Progressing  Goal: Emotionally satisfying birthing experience for mother/fetus  Description: Interventions:  - Assess, plan, implement and evaluate the nursing care given to the patient in labor  - Advocate the philosophy that each childbirth experience is a unique experience and support the family's chosen level of involvement and control during the labor process   - Actively participate in both the patient's and family's teaching of the birth process  - Consider cultural, Mu-ism and age-specific factors and plan care for the patient in labor  Outcome: Progressing     Problem: Knowledge Deficit  Goal: Verbalizes understanding of labor plan  Description: Assess patient/family/caregiver's baseline knowledge level and ability to understand information  Provide education via patient/family/caregiver's preferred learning method at appropriate level of understanding  1  Provide teaching at level of understanding  2  Provide teaching via preferred learning method(s)  Outcome: Progressing  Goal: Patient/family/caregiver demonstrates understanding of disease process, treatment plan, medications, and discharge instructions  Description: Complete learning assessment and assess knowledge base  Interventions:  - Provide teaching at level of understanding  - Provide teaching via preferred learning methods  Outcome: Progressing     Problem: Labor & Delivery  Goal: Manages discomfort  Description: Assess and monitor for signs and symptoms of discomfort  Assess patient's pain level regularly and per hospital policy  Administer medications as ordered   Support use of nonpharmacological methods to help control pain such as distraction, imagery, relaxation, and application of heat and cold  Collaborate with interdisciplinary team and patient to determine appropriate pain management plan  1  Include patient in decisions related to comfort  2  Offer non-pharmacological pain management interventions  3  Report ineffective pain management to physician  Outcome: Progressing  Goal: Patient vital signs are stable  Description: 1  Assess vital signs - vaginal delivery    Outcome: Progressing     Problem: PAIN - ADULT  Goal: Verbalizes/displays adequate comfort level or baseline comfort level  Description: Interventions:  - Encourage patient to monitor pain and request assistance  - Assess pain using appropriate pain scale  - Administer analgesics based on type and severity of pain and evaluate response  - Implement non-pharmacological measures as appropriate and evaluate response  - Consider cultural and social influences on pain and pain management  - Notify physician/advanced practitioner if interventions unsuccessful or patient reports new pain  Outcome: Progressing     Problem: INFECTION - ADULT  Goal: Absence or prevention of progression during hospitalization  Description: INTERVENTIONS:  - Assess and monitor for signs and symptoms of infection  - Monitor lab/diagnostic results  - Monitor all insertion sites, i e  indwelling lines, tubes, and drains  - Monitor endotracheal if appropriate and nasal secretions for changes in amount and color  - [place] appropriate cooling/warming therapies per order  - Administer medications as ordered  - Instruct and encourage patient and family to use good hand hygiene technique  - Identify and instruct in appropriate isolation precautions for identified infection/condition  Outcome: Progressing  Goal: Absence of fever/infection during neutropenic period  Description: INTERVENTIONS:  - Monitor WBC    Outcome: Progressing     Problem: SAFETY ADULT  Goal: Patient will remain free of falls  Description: INTERVENTIONS:  - Educate patient/family on patient safety including physical limitations  - Instruct patient to call for assistance with activity   - Consult OT/PT to assist with strengthening/mobility   - Keep Call bell within reach  - Keep bed low and locked with side rails adjusted as appropriate  - Keep care items and personal belongings within reach  - Initiate and maintain comfort rounds  - Make Fall Risk Sign visible to staff  - Apply yellow socks and bracelet for high fall risk patients  - Consider moving patient to room near nurses station  Outcome: Progressing  Goal: Maintain or return to baseline ADL function  Description: INTERVENTIONS:  -  Assess patient's ability to carry out ADLs; assess patient's baseline for ADL function and identify physical deficits which impact ability to perform ADLs (bathing, care of mouth/teeth, toileting, grooming, dressing, etc )  - Assess/evaluate cause of self-care deficits   - Assess range of motion  - Assess patient's mobility; develop plan if impaired  - Assess patient's need for assistive devices and provide as appropriate  - Encourage maximum independence but intervene and supervise when necessary  - Involve family in performance of ADLs  - Assess for home care needs following discharge   - Consider OT consult to assist with ADL evaluation and planning for discharge  - Provide patient education as appropriate  Outcome: Progressing  Goal: Maintains/Returns to pre admission functional level  Description: INTERVENTIONS:  - Perform BMAT or MOVE assessment daily    - Set and communicate daily mobility goal to care team and patient/family/caregiver     - Collaborate with rehabilitation services on mobility goals if consulted  - Out of bed for toileting  - Record patient progress and toleration of activity level   Outcome: Progressing     Problem: DISCHARGE PLANNING  Goal: Discharge to home or other facility with appropriate resources  Description: INTERVENTIONS:  - Identify barriers to discharge w/patient and caregiver  - Arrange for needed discharge resources and transportation as appropriate  - Identify discharge learning needs (meds, wound care, etc )  - Arrange for interpretive services to assist at discharge as needed  - Refer to Case Management Department for coordinating discharge planning if the patient needs post-hospital services based on physician/advanced practitioner order or complex needs related to functional status, cognitive ability, or social support system  Outcome: Progressing

## 2021-08-23 NOTE — OB LABOR/OXYTOCIN SAFETY PROGRESS
Oxytocin Safety Progress Check Note - Damien Caul 27 y o  female MRN: [id number]    Unit/Bed#: -01 Encounter: [id number]    Dose (hoa-units/min) Oxytocin: 26 hoa-units/min  Contraction Frequency (minutes): 2-3  Contraction Quality: Strong  Tachysystole: No   Cervical Dilation: 4-5        Cervical Effacement: 80  Fetal Station: -2  Baseline Rate: 125 bpm  Fetal Heart Rate: 125 BPM  FHR Category: Category I               Vital Signs:   Vitals:    08/23/21 0830   BP: 96/54   Pulse: 81   Resp:    Temp:    SpO2:            Notes/comments: Feeling pressure; will have epidural redosed       Mony Parker MD 8/23/2021 8:42 AM

## 2021-08-23 NOTE — OB LABOR/OXYTOCIN SAFETY PROGRESS
Oxytocin Safety Progress Check Note - Get Grover 27 y o  female MRN: [id number]    Unit/Bed#: -01 Encounter: [id number]    Dose (hoa-units/min) Oxytocin: 10 hoa-units/min  Contraction Frequency (minutes): difficulty tracing on pt's side  Contraction Quality: Mild  Tachysystole: No   Cervical Dilation: 4        Cervical Effacement: 40  Fetal Station: -3  Baseline Rate: 130 bpm  Fetal Heart Rate: 128 BPM  FHR Category: Category I               Vital Signs:   Vitals:    08/22/21 2205   BP: 96/54   Pulse: 92   Resp:    Temp:    SpO2:            Notes/comments: SVE deferred, patient sleeping, strip cat 1, not tracing toco well       Divya Aguayo MD 8/22/2021 11:01 PM

## 2021-08-23 NOTE — ANESTHESIA POSTPROCEDURE EVALUATION
Post-Op Assessment Note    CV Status:  Stable    Pain management: adequate     Mental Status:  Alert and awake   Hydration Status:  Euvolemic   PONV Controlled:  Controlled   Airway Patency:  Patent      Post Op Vitals Reviewed: Yes      Staff: CRNA     Post-op block assessment: no complications and catheter intact      No complications documented      BP   134/70   Temp   99 1   Pulse  74   Resp   22   SpO2   99

## 2021-08-23 NOTE — OB LABOR/OXYTOCIN SAFETY PROGRESS
Oxytocin Safety Progress Check Note - John Cleveland 27 y o  female MRN: [id number]    Unit/Bed#: -01 Encounter: [id number]    Dose (hoa-units/min) Oxytocin: 4 hoa-units/min  Contraction Frequency (minutes): irregular  Contraction Quality: Mild  Tachysystole: No   Cervical Dilation: 4        Cervical Effacement: 40  Fetal Station: -3  Baseline Rate: 120 bpm  Fetal Heart Rate: 116 BPM  FHR Category: Category I               Vital Signs:   Vitals:    08/22/21 2020   BP: 97/51   Pulse: 95   Resp:    Temp:    SpO2:            Notes/comments: FB out, patient received stadol/phenergan for pain, will continue to increase pitocin       Solange Medina MD 8/22/2021 8:42 PM

## 2021-08-23 NOTE — OB LABOR/OXYTOCIN SAFETY PROGRESS
Oxytocin Safety Progress Check Note - Amanuel Perez 27 y o  female MRN: [id number]    Unit/Bed#: -01 Encounter: [id number]    Dose (hoa-units/min) Oxytocin: 18 hoa-units/min  Contraction Frequency (minutes): 2-4  Contraction Quality: Mild  Tachysystole: No   Cervical Dilation: 4        Cervical Effacement: 50  Fetal Station: -3  Baseline Rate: 120 bpm  Fetal Heart Rate: 144 BPM  FHR Category: Category I               Vital Signs:   Vitals:    08/23/21 0300   BP: 100/54   Pulse: 86   Resp:    Temp: 98 7 °F (37 1 °C)   SpO2:            Notes/comments: minimal cervical change, repositioned to high fowlers, continuing to increase pitocin, strip is category 1, estuardo q3-4       Denver Paganini, MD 8/23/2021 3:28 AM

## 2021-08-23 NOTE — ANESTHESIA PROCEDURE NOTES
Epidural Block    Patient location during procedure: OB  Start time: 8/22/2021 11:48 PM  Reason for block: procedure for pain and at surgeon's request  Staffing  Anesthesiologist: Alfred Strange MD  Resident/CRNA: Isaias Flanagan CRNA  Preanesthetic Checklist  Completed: patient identified, IV checked, site marked, risks and benefits discussed, surgical consent, monitors and equipment checked, pre-op evaluation and timeout performed  Epidural  Patient position: sitting  Prep: ChloraPrep  Patient monitoring: cardiac monitor and frequent blood pressure checks  Approach: midline  Location: lumbar  Injection technique: KIM air  Needle  Needle type: Tuohy   Needle gauge: 18 G  Catheter type: side hole  Catheter size: 20 G  Catheter at skin depth: 12 cm  Catheter securement method: stabilization device  Test dose: negativelidocaine 1 5% with epinephrine 1:200,000 test dose, 5 mL  Assessment  Sensory level: T10  Number of attempts: 1negative aspiration for CSF, negative aspiration for heme and no paresthesia on injection  patient tolerated the procedure well with no immediate complications

## 2021-08-23 NOTE — OB LABOR/OXYTOCIN SAFETY PROGRESS
Oxytocin Safety Progress Check Note - Divine Alonso 27 y o  female MRN: [id number]    Unit/Bed#: -01 Encounter: [id number]    Dose (hoa-units/min) Oxytocin: 28 hoa-units/min  Contraction Frequency (minutes): 2-3 5  Contraction Quality: Moderate  Tachysystole: No   Cervical Dilation: 6-7        Cervical Effacement: 90  Fetal Station: -1  Baseline Rate: 145 bpm  Fetal Heart Rate: 144 BPM  FHR Category: Category I             Vital Signs:   Vitals:    08/23/21 1301   BP: 123/84   Pulse: 99   Resp:    Temp:    SpO2:      Notes/comments:   Pt states she is uncomfortable and feels increased pressure  SVE as above  Will continue titrating pitocin   Will recheck in 2h or as clinically iindicated       Laura Arshad MD 8/23/2021 1:05 PM

## 2021-08-24 LAB
ALBUMIN SERPL BCP-MCNC: 1.5 G/DL (ref 3.5–5)
ALBUMIN SERPL BCP-MCNC: 1.6 G/DL (ref 3.5–5)
ALP SERPL-CCNC: 108 U/L (ref 46–116)
ALP SERPL-CCNC: 121 U/L (ref 46–116)
ALT SERPL W P-5'-P-CCNC: 9 U/L (ref 12–78)
ALT SERPL W P-5'-P-CCNC: 9 U/L (ref 12–78)
ANION GAP SERPL CALCULATED.3IONS-SCNC: 10 MMOL/L (ref 4–13)
ANION GAP SERPL CALCULATED.3IONS-SCNC: 12 MMOL/L (ref 4–13)
AST SERPL W P-5'-P-CCNC: 16 U/L (ref 5–45)
AST SERPL W P-5'-P-CCNC: 19 U/L (ref 5–45)
BASOPHILS # BLD AUTO: 0.03 THOUSANDS/ΜL (ref 0–0.1)
BASOPHILS # BLD AUTO: 0.03 THOUSANDS/ΜL (ref 0–0.1)
BASOPHILS NFR BLD AUTO: 0 % (ref 0–1)
BASOPHILS NFR BLD AUTO: 0 % (ref 0–1)
BILIRUB SERPL-MCNC: 0.18 MG/DL (ref 0.2–1)
BILIRUB SERPL-MCNC: 0.3 MG/DL (ref 0.2–1)
BUN SERPL-MCNC: 6 MG/DL (ref 5–25)
BUN SERPL-MCNC: 7 MG/DL (ref 5–25)
CALCIUM ALBUM COR SERPL-MCNC: 9.6 MG/DL (ref 8.3–10.1)
CALCIUM ALBUM COR SERPL-MCNC: 9.7 MG/DL (ref 8.3–10.1)
CALCIUM SERPL-MCNC: 7.6 MG/DL (ref 8.3–10.1)
CALCIUM SERPL-MCNC: 7.8 MG/DL (ref 8.3–10.1)
CHLORIDE SERPL-SCNC: 108 MMOL/L (ref 100–108)
CHLORIDE SERPL-SCNC: 108 MMOL/L (ref 100–108)
CO2 SERPL-SCNC: 19 MMOL/L (ref 21–32)
CO2 SERPL-SCNC: 21 MMOL/L (ref 21–32)
CREAT SERPL-MCNC: 0.65 MG/DL (ref 0.6–1.3)
CREAT SERPL-MCNC: 0.77 MG/DL (ref 0.6–1.3)
EOSINOPHIL # BLD AUTO: 0 THOUSAND/ΜL (ref 0–0.61)
EOSINOPHIL # BLD AUTO: 0.03 THOUSAND/ΜL (ref 0–0.61)
EOSINOPHIL NFR BLD AUTO: 0 % (ref 0–6)
EOSINOPHIL NFR BLD AUTO: 0 % (ref 0–6)
ERYTHROCYTE [DISTWIDTH] IN BLOOD BY AUTOMATED COUNT: 14.6 % (ref 11.6–15.1)
ERYTHROCYTE [DISTWIDTH] IN BLOOD BY AUTOMATED COUNT: 14.8 % (ref 11.6–15.1)
ERYTHROCYTE [DISTWIDTH] IN BLOOD BY AUTOMATED COUNT: 14.8 % (ref 11.6–15.1)
GFR SERPL CREATININE-BSD FRML MDRD: 108 ML/MIN/1.73SQ M
GFR SERPL CREATININE-BSD FRML MDRD: 125 ML/MIN/1.73SQ M
GLUCOSE SERPL-MCNC: 103 MG/DL (ref 65–140)
GLUCOSE SERPL-MCNC: 72 MG/DL (ref 65–140)
HCT VFR BLD AUTO: 21.7 % (ref 34.8–46.1)
HCT VFR BLD AUTO: 25.1 % (ref 34.8–46.1)
HCT VFR BLD AUTO: 28.4 % (ref 34.8–46.1)
HGB BLD-MCNC: 6.7 G/DL (ref 11.5–15.4)
HGB BLD-MCNC: 7.7 G/DL (ref 11.5–15.4)
HGB BLD-MCNC: 9 G/DL (ref 11.5–15.4)
IMM GRANULOCYTES # BLD AUTO: 0.11 THOUSAND/UL (ref 0–0.2)
IMM GRANULOCYTES # BLD AUTO: 0.12 THOUSAND/UL (ref 0–0.2)
IMM GRANULOCYTES NFR BLD AUTO: 1 % (ref 0–2)
IMM GRANULOCYTES NFR BLD AUTO: 1 % (ref 0–2)
LYMPHOCYTES # BLD AUTO: 1.61 THOUSANDS/ΜL (ref 0.6–4.47)
LYMPHOCYTES # BLD AUTO: 2.56 THOUSANDS/ΜL (ref 0.6–4.47)
LYMPHOCYTES NFR BLD AUTO: 19 % (ref 14–44)
LYMPHOCYTES NFR BLD AUTO: 9 % (ref 14–44)
MCH RBC QN AUTO: 24.1 PG (ref 26.8–34.3)
MCH RBC QN AUTO: 24.2 PG (ref 26.8–34.3)
MCH RBC QN AUTO: 25.6 PG (ref 26.8–34.3)
MCHC RBC AUTO-ENTMCNC: 30.7 G/DL (ref 31.4–37.4)
MCHC RBC AUTO-ENTMCNC: 30.9 G/DL (ref 31.4–37.4)
MCHC RBC AUTO-ENTMCNC: 31.7 G/DL (ref 31.4–37.4)
MCV RBC AUTO: 78 FL (ref 82–98)
MCV RBC AUTO: 78 FL (ref 82–98)
MCV RBC AUTO: 81 FL (ref 82–98)
MONOCYTES # BLD AUTO: 1.05 THOUSAND/ΜL (ref 0.17–1.22)
MONOCYTES # BLD AUTO: 1.12 THOUSAND/ΜL (ref 0.17–1.22)
MONOCYTES NFR BLD AUTO: 6 % (ref 4–12)
MONOCYTES NFR BLD AUTO: 8 % (ref 4–12)
NEUTROPHILS # BLD AUTO: 15.24 THOUSANDS/ΜL (ref 1.85–7.62)
NEUTROPHILS # BLD AUTO: 9.87 THOUSANDS/ΜL (ref 1.85–7.62)
NEUTS SEG NFR BLD AUTO: 72 % (ref 43–75)
NEUTS SEG NFR BLD AUTO: 84 % (ref 43–75)
NRBC BLD AUTO-RTO: 0 /100 WBCS
NRBC BLD AUTO-RTO: 0 /100 WBCS
PLATELET # BLD AUTO: 155 THOUSANDS/UL (ref 149–390)
PLATELET # BLD AUTO: 165 THOUSANDS/UL (ref 149–390)
PLATELET # BLD AUTO: 175 THOUSANDS/UL (ref 149–390)
PMV BLD AUTO: 12.2 FL (ref 8.9–12.7)
PMV BLD AUTO: 12.9 FL (ref 8.9–12.7)
PMV BLD AUTO: 13 FL (ref 8.9–12.7)
POTASSIUM SERPL-SCNC: 3.6 MMOL/L (ref 3.5–5.3)
POTASSIUM SERPL-SCNC: 4 MMOL/L (ref 3.5–5.3)
PROT SERPL-MCNC: 4.6 G/DL (ref 6.4–8.2)
PROT SERPL-MCNC: 4.9 G/DL (ref 6.4–8.2)
RBC # BLD AUTO: 2.77 MILLION/UL (ref 3.81–5.12)
RBC # BLD AUTO: 3.2 MILLION/UL (ref 3.81–5.12)
RBC # BLD AUTO: 3.52 MILLION/UL (ref 3.81–5.12)
SODIUM SERPL-SCNC: 139 MMOL/L (ref 136–145)
SODIUM SERPL-SCNC: 139 MMOL/L (ref 136–145)
WBC # BLD AUTO: 11.35 THOUSAND/UL (ref 4.31–10.16)
WBC # BLD AUTO: 13.65 THOUSAND/UL (ref 4.31–10.16)
WBC # BLD AUTO: 18.12 THOUSAND/UL (ref 4.31–10.16)

## 2021-08-24 PROCEDURE — 80053 COMPREHEN METABOLIC PANEL: CPT

## 2021-08-24 PROCEDURE — 30233N1 TRANSFUSION OF NONAUTOLOGOUS RED BLOOD CELLS INTO PERIPHERAL VEIN, PERCUTANEOUS APPROACH: ICD-10-PCS | Performed by: OBSTETRICS & GYNECOLOGY

## 2021-08-24 PROCEDURE — P9016 RBC LEUKOCYTES REDUCED: HCPCS

## 2021-08-24 PROCEDURE — 99024 POSTOP FOLLOW-UP VISIT: CPT | Performed by: OBSTETRICS & GYNECOLOGY

## 2021-08-24 PROCEDURE — 94760 N-INVAS EAR/PLS OXIMETRY 1: CPT

## 2021-08-24 PROCEDURE — 85025 COMPLETE CBC W/AUTO DIFF WBC: CPT

## 2021-08-24 PROCEDURE — 85027 COMPLETE CBC AUTOMATED: CPT

## 2021-08-24 RX ADMIN — SODIUM CHLORIDE, SODIUM LACTATE, POTASSIUM CHLORIDE, AND CALCIUM CHLORIDE 125 ML/HR: .6; .31; .03; .02 INJECTION, SOLUTION INTRAVENOUS at 01:21

## 2021-08-24 RX ADMIN — DOCUSATE SODIUM 100 MG: 100 CAPSULE ORAL at 10:21

## 2021-08-24 RX ADMIN — SODIUM CHLORIDE 3 G: 9 INJECTION, SOLUTION INTRAVENOUS at 10:53

## 2021-08-24 RX ADMIN — OXYCODONE HYDROCHLORIDE AND ACETAMINOPHEN 1 TABLET: 5; 325 TABLET ORAL at 15:26

## 2021-08-24 RX ADMIN — SODIUM CHLORIDE 3 G: 9 INJECTION, SOLUTION INTRAVENOUS at 03:59

## 2021-08-24 RX ADMIN — SODIUM CHLORIDE 3 G: 9 INJECTION, SOLUTION INTRAVENOUS at 17:45

## 2021-08-24 RX ADMIN — OXYCODONE HYDROCHLORIDE AND ACETAMINOPHEN 1 TABLET: 5; 325 TABLET ORAL at 10:27

## 2021-08-24 RX ADMIN — SODIUM CHLORIDE, SODIUM LACTATE, POTASSIUM CHLORIDE, AND CALCIUM CHLORIDE 125 ML/HR: .6; .31; .03; .02 INJECTION, SOLUTION INTRAVENOUS at 06:02

## 2021-08-24 RX ADMIN — OXYCODONE HYDROCHLORIDE AND ACETAMINOPHEN 1 TABLET: 5; 325 TABLET ORAL at 06:02

## 2021-08-24 RX ADMIN — SERTRALINE HYDROCHLORIDE 50 MG: 50 TABLET ORAL at 10:21

## 2021-08-24 RX ADMIN — IBUPROFEN 600 MG: 600 TABLET ORAL at 20:55

## 2021-08-24 RX ADMIN — HYDROMORPHONE HYDROCHLORIDE 0.5 MG: 1 INJECTION, SOLUTION INTRAMUSCULAR; INTRAVENOUS; SUBCUTANEOUS at 02:18

## 2021-08-24 RX ADMIN — IBUPROFEN 600 MG: 600 TABLET ORAL at 01:19

## 2021-08-24 RX ADMIN — DOCUSATE SODIUM 100 MG: 100 CAPSULE ORAL at 18:52

## 2021-08-24 RX ADMIN — IBUPROFEN 600 MG: 600 TABLET ORAL at 08:51

## 2021-08-24 NOTE — QUICK NOTE
100 cc removed from Bakri at bedside  No further bleeding noted thereafter from the Bakri  Patient feels well at this time  Will return in a few hours to remove another 100 cc       D/w Dr Mario Alberto Benoit MD  PGY-2 OB/GYN   8/24/2021 8:21 AM

## 2021-08-24 NOTE — ANESTHESIA PROCEDURE NOTES
Arterial Line Insertion  Performed by: Suzi Lew MD  Authorized by: Suzi Lew MD   Consent: The procedure was performed in an emergent situation  Patient identity confirmed: arm band  Preparation: Patient was prepped and draped in the usual sterile fashion  Indications: hemodynamic monitoring  Orientation:  Left  Location: radial arteryMedication group details: ga    Procedure Details:  Needle gauge: 20  Seldinger technique: Seldinger technique used  Number of attempts: 1    Post-procedure:  Post-procedure: dressing applied  Waveform: good waveform and waveform confirmed  Patient tolerance: patient tolerated the procedure well with no immediate complications  Comments: Post-induction  Surgeon concerned about EBL with procedure - arterial line placed for close HD monitoring, labs intra-op

## 2021-08-24 NOTE — LACTATION NOTE
This note was copied from a baby's chart  CONSULT - LACTATION  Baby Girl Kin Ormond) [address] 1 days female MRN: [id number]    [address] Room / Bed:  302(N)/ 302(N) Encounter: [id number]    Maternal Information     MOTHER:  Christine Warren  Maternal Age: 27 y o    OB History: # 1 - Date: 21, Sex: Female, Weight: 3625 g (7 lb 15 9 oz), GA: 38w3d, Delivery: Vaginal, Spontaneous, Apgar1: 8, Apgar5: 9, Living: Living, Birth Comments: None   Previouse breast reduction surgery? No    Lactation history:   Has patient previously breast fed: No   How long had patient previously breast fed:     Previous breast feeding complications:       Past Surgical History:   Procedure Laterality Date    TONSILLECTOMY  2009    WISDOM TOOTH EXTRACTION          Birth information:  YOB: 2019   Time of birth: 2:1 PM   Sex: female   Delivery type: Vaginal, Spontaneous   Birth Weight: 3625 g (7 lb 15 9 oz)   Percent of Weight Change: -2%     Gestational Age: 36w4d   [unfilled]    Assessment     Breast and nipple assessment: large symmetrical breasts with nipples facing downward  Darker areolas     Assessment: baby sounds stuffy; mom states baby has been spitty    Feeding assessment: latch difficulty (as per mom; baby will not latch)  LATCH:  Latch: Repeated attempts, hold nipple in mouth, stimulate to suck   Audible Swallowing: Spontaneous and intermittent (24 hours old)   Type of Nipple: Everted (After stimulation)   Comfort (Breast/Nipple): Soft/non-tender   Hold (Positioning): Partial assist, teach one side, mother does other, staff holds   [facility] Score: 8          Feeding recommendations:  breast feed on demand  Assisted mom with positioning and latch  Due to large breasts with nipples facing downward, Ardith Asp has a difficult time latching  Assisted mom in changing position in the bed to a more upright position   Pillows were used to lift the breast tissue and allow mom to see nipple  Baby brought to the breast in a football hold  Alignment of nipple to nose, drag down to chin to achieve a wide, deep latch  Active suckling bursts occurred  Education on signs of satiation, feeding log, monitor output and offering both breasts at every feeding session  Mom has a pump at home from this pregnancy  Met with mother  Provided mother with Ready, Set, Baby booklet  Discussed Skin to Skin contact an benefits to mom and baby  Talked about the delay of the first bath until baby has adjusted  Spoke about the benefits of rooming in  Feeding on cue and what that means for recognizing infant's hunger  Avoidance of pacifiers for the first month discussed  Talked about exclusive breastfeeding for the first 6 months  Positioning and latch reviewed as well as showing images of other feeding positions  Discussed the properties of a good latch in any position  Reviewed hand/manual expression  Discussed s/s that baby is getting enough milk and some s/s that breastfeeding dyad may need further help  Gave information on common concerns, what to expect the first few weeks after delivery, preparing for other caregivers, and how partners can help  Resources for support also provided  Information on hand expression given  Discussed benefits of knowing how to manually express breast including stimulating milk supply, softening nipple for latch and evacuating breast in the event of engorgement  Provided education on alignment of nose to breast; bring baby to breast and not breast to baby; support head with opp  Hand in cross cradle; use pillows to lift baby to be belly to belly; ear, shoulder, hip alignment; Support mother's back and place self in comfortable position to support bringing baby to the breast  Shoulders should be down and away from ears  Worked on positioning infant up at chest level and starting to feed infant with nose arriving at the nipple   Then, using areolar compression to achieve a deep latch that is comfortable and exchanges optimum amounts of milk  - Start feedings on breast that last feeding ended   - allow no more than 3 hours between breast feeding sessions   - time between feedings is counted from the beginning of the first feed to the beginning of the next feeding session    Mom is encouraged to place baby skin to skin for feedings  Skin to skin education provided for baby placement on mother's chest, baby only in diaper, blankets below shoulders on baby's back  Skin to skin is encouraged to continue at home for feedings and between feedings  Encouraged parents to call for assistance, questions, and concerns about breastfeeding  Extension provided      Xiang Harding 8/24/2021 11:22 AM

## 2021-08-24 NOTE — CASE MANAGEMENT
Consult: Used before pregnancy per note; Per review of chart, baby UDS + barbiturates on admission; Confirmed with Dr Randall May that + UDS for MOB and baby likely due to MOB being given fioricet  No additional concerns for discharge when medically cleared

## 2021-08-24 NOTE — PLAN OF CARE
Problem: PAIN - ADULT  Goal: Verbalizes/displays adequate comfort level or baseline comfort level  Description: Interventions:  - Encourage patient to monitor pain and request assistance  - Assess pain using appropriate pain scale  - Administer analgesics based on type and severity of pain and evaluate response  - Implement non-pharmacological measures as appropriate and evaluate response  - Consider cultural and social influences on pain and pain management  - Notify physician/advanced practitioner if interventions unsuccessful or patient reports new pain  Outcome: Progressing     Problem: INFECTION - ADULT  Goal: Absence or prevention of progression during hospitalization  Description: INTERVENTIONS:  - Assess and monitor for signs and symptoms of infection  - Monitor lab/diagnostic results  - Monitor all insertion sites, i e  indwelling lines, tubes, and drains  - Monitor endotracheal if appropriate and nasal secretions for changes in amount and color  - [place] appropriate cooling/warming therapies per order  - Administer medications as ordered  - Instruct and encourage patient and family to use good hand hygiene technique  - Identify and instruct in appropriate isolation precautions for identified infection/condition  Outcome: Progressing  Goal: Absence of fever/infection during neutropenic period  Description: INTERVENTIONS:  - Monitor WBC    Outcome: Progressing     Problem: SAFETY ADULT  Goal: Patient will remain free of falls  Description: INTERVENTIONS:  - Educate patient/family on patient safety including physical limitations  - Instruct patient to call for assistance with activity   - Consult OT/PT to assist with strengthening/mobility   - Keep Call bell within reach  - Keep bed low and locked with side rails adjusted as appropriate  - Keep care items and personal belongings within reach  - Initiate and maintain comfort rounds  - Make Fall Risk Sign visible to staff  - Offer Toileting every  Hours, in advance of need  - Initiate/Maintain alarm  - Obtain necessary fall risk management equipment:   - Apply yellow socks and bracelet for high fall risk patients  - Consider moving patient to room near nurses station  Outcome: Progressing  Goal: Maintain or return to baseline ADL function  Description: INTERVENTIONS:  -  Assess patient's ability to carry out ADLs; assess patient's baseline for ADL function and identify physical deficits which impact ability to perform ADLs (bathing, care of mouth/teeth, toileting, grooming, dressing, etc )  - Assess/evaluate cause of self-care deficits   - Assess range of motion  - Assess patient's mobility; develop plan if impaired  - Assess patient's need for assistive devices and provide as appropriate  - Encourage maximum independence but intervene and supervise when necessary  - Involve family in performance of ADLs  - Assess for home care needs following discharge   - Consider OT consult to assist with ADL evaluation and planning for discharge  - Provide patient education as appropriate  Outcome: Progressing  Goal: Maintains/Returns to pre admission functional level  Description: INTERVENTIONS:  - Perform BMAT or MOVE assessment daily    - Set and communicate daily mobility goal to care team and patient/family/caregiver  - Collaborate with rehabilitation services on mobility goals if consulted  - Perform Range of Motion  times a day  - Reposition patient every  hours    - Dangle patient  times a day  - Stand patient  times a day  - Ambulate patient  times a day  - Out of bed to chair  times a day   - Out of bed for meals  times a day  - Out of bed for toileting  - Record patient progress and toleration of activity level   Outcome: Progressing     Problem: DISCHARGE PLANNING  Goal: Discharge to home or other facility with appropriate resources  Description: INTERVENTIONS:  - Identify barriers to discharge w/patient and caregiver  - Arrange for needed discharge resources and transportation as appropriate  - Identify discharge learning needs (meds, wound care, etc )  - Arrange for interpretive services to assist at discharge as needed  - Refer to Case Management Department for coordinating discharge planning if the patient needs post-hospital services based on physician/advanced practitioner order or complex needs related to functional status, cognitive ability, or social support system  Outcome: Progressing     Problem: Potential for Falls  Goal: Patient will remain free of falls  Description: INTERVENTIONS:  - Educate patient/family on patient safety including physical limitations  - Instruct patient to call for assistance with activity   - Consult OT/PT to assist with strengthening/mobility   - Keep Call bell within reach  - Keep bed low and locked with side rails adjusted as appropriate  - Keep care items and personal belongings within reach  - Initiate and maintain comfort rounds  - Make Fall Risk Sign visible to staff  - Offer Toileting every  Hours, in advance of need  - Initiate/Maintain alarm  - Obtain necessary fall risk management equipment:   - Apply yellow socks and bracelet for high fall risk patients  - Consider moving patient to room near nurses station  Outcome: Progressing     Problem: POSTPARTUM  Goal: Experiences normal postpartum course  Description: INTERVENTIONS:  - Monitor maternal vital signs  - Assess uterine involution and lochia  Outcome: Progressing  Goal: Appropriate maternal -  bonding  Description: INTERVENTIONS:  - Identify family support  - Assess for appropriate maternal/infant bonding   -Encourage maternal/infant bonding opportunities  - Referral to  or  as needed  Outcome: Progressing  Goal: Establishment of infant feeding pattern  Description: INTERVENTIONS:  - Assess breast/bottle feeding  - Refer to lactation as needed  Outcome: Progressing  Goal: Incision(s), wounds(s) or drain site(s) healing without S/S of infection  Description: INTERVENTIONS  - Assess and document dressing, incision, wound bed, drain sites and surrounding tissue  - Provide patient and family education  - Perform skin care/dressing changes every  Outcome: Progressing

## 2021-08-24 NOTE — PLAN OF CARE
Problem: BIRTH - VAGINAL/ SECTION  Goal: Fetal and maternal status remain reassuring during the birth process  Description: INTERVENTIONS:  - Monitor vital signs  - Monitor fetal heart rate  - Monitor uterine activity  - Monitor labor progression (vaginal delivery)  - DVT prophylaxis  - Antibiotic prophylaxis  Outcome: Completed  Goal: Emotionally satisfying birthing experience for mother/fetus  Description: Interventions:  - Assess, plan, implement and evaluate the nursing care given to the patient in labor  - Advocate the philosophy that each childbirth experience is a unique experience and support the family's chosen level of involvement and control during the labor process   - Actively participate in both the patient's and family's teaching of the birth process  - Consider cultural, Voodoo and age-specific factors and plan care for the patient in labor  Outcome: Completed     Problem: Knowledge Deficit  Goal: Verbalizes understanding of labor plan  Description: Assess patient/family/caregiver's baseline knowledge level and ability to understand information  Provide education via patient/family/caregiver's preferred learning method at appropriate level of understanding  1  Provide teaching at level of understanding  2  Provide teaching via preferred learning method(s)  Outcome: Completed  Goal: Patient/family/caregiver demonstrates understanding of disease process, treatment plan, medications, and discharge instructions  Description: Complete learning assessment and assess knowledge base  Interventions:  - Provide teaching at level of understanding  - Provide teaching via preferred learning methods  Outcome: Completed     Problem: Labor & Delivery  Goal: Manages discomfort  Description: Assess and monitor for signs and symptoms of discomfort  Assess patient's pain level regularly and per hospital policy  Administer medications as ordered   Support use of nonpharmacological methods to help control pain such as distraction, imagery, relaxation, and application of heat and cold  Collaborate with interdisciplinary team and patient to determine appropriate pain management plan  1  Include patient in decisions related to comfort  2  Offer non-pharmacological pain management interventions  3  Report ineffective pain management to physician  Outcome: Completed  Goal: Patient vital signs are stable  Description: 1  Assess vital signs - vaginal delivery    Outcome: Completed     Problem: PAIN - ADULT  Goal: Verbalizes/displays adequate comfort level or baseline comfort level  Description: Interventions:  - Encourage patient to monitor pain and request assistance  - Assess pain using appropriate pain scale  - Administer analgesics based on type and severity of pain and evaluate response  - Implement non-pharmacological measures as appropriate and evaluate response  - Consider cultural and social influences on pain and pain management  - Notify physician/advanced practitioner if interventions unsuccessful or patient reports new pain  Outcome: Progressing     Problem: INFECTION - ADULT  Goal: Absence or prevention of progression during hospitalization  Description: INTERVENTIONS:  - Assess and monitor for signs and symptoms of infection  - Monitor lab/diagnostic results  - Monitor all insertion sites, i e  indwelling lines, tubes, and drains  - Monitor endotracheal if appropriate and nasal secretions for changes in amount and color  - [place] appropriate cooling/warming therapies per order  - Administer medications as ordered  - Instruct and encourage patient and family to use good hand hygiene technique  - Identify and instruct in appropriate isolation precautions for identified infection/condition  Outcome: Progressing  Goal: Absence of fever/infection during neutropenic period  Description: INTERVENTIONS:  - Monitor WBC    Outcome: Progressing     Problem: SAFETY ADULT  Goal: Patient will remain free of falls  Description: INTERVENTIONS:  - Educate patient/family on patient safety including physical limitations  - Instruct patient to call for assistance with activity   - Consult OT/PT to assist with strengthening/mobility   - Keep Call bell within reach  - Keep bed low and locked with side rails adjusted as appropriate  - Keep care items and personal belongings within reach  - Initiate and maintain comfort rounds  - Make Fall Risk Sign visible to staff  - Offer Toileting every  Hours, in advance of need  - Initiate/Maintainalarm  - Obtain necessary fall risk management equipment:   - Apply yellow socks and bracelet for high fall risk patients  - Consider moving patient to room near nurses station  Outcome: Progressing  Goal: Maintain or return to baseline ADL function  Description: INTERVENTIONS:  -  Assess patient's ability to carry out ADLs; assess patient's baseline for ADL function and identify physical deficits which impact ability to perform ADLs (bathing, care of mouth/teeth, toileting, grooming, dressing, etc )  - Assess/evaluate cause of self-care deficits   - Assess range of motion  - Assess patient's mobility; develop plan if impaired  - Assess patient's need for assistive devices and provide as appropriate  - Encourage maximum independence but intervene and supervise when necessary  - Involve family in performance of ADLs  - Assess for home care needs following discharge   - Consider OT consult to assist with ADL evaluation and planning for discharge  - Provide patient education as appropriate  Outcome: Progressing

## 2021-08-24 NOTE — UTILIZATION REVIEW
Inpatient Admission Authorization Request   Notification of Maternity/Delivery &  Birth Information for Admission   SERVICING FACILITY:   [address]  Tax ID: [id number]  NPI: [id number]  Place of Service: Inpatient [facility]  [address]  Place of Service Code: 24     ATTENDING PROVIDER:  Attending Name and NPI#: Alyse Urszula, [address] [[id number]]  Address: Jimy Sales  [address]  Phone: [phone]     UTILIZATION REVIEW CONTACT:  Delmi Solorio Utilization   Network Utilization Review Department  Phone: [phone]  Fax [phone]  Email: [email or web address]     PHYSICIAN ADVISORY SERVICES:  FOR UOUU-NS-QFKB REVIEW - MEDICAL NECESSITY DENIAL  Phone: [phone]  Fax: [phone]  Email: [email or web address]     TYPE OF REQUEST:  Inpatient Status     ADMISSION INFORMATION:  ADMISSION DATE/TIME: 21  9:33 AM  PATIENT DIAGNOSIS CODE/DESCRIPTION:  Pregnancy [Z34 90]  38 weeks gestation of pregnancy [Z3A 38]  Encounter for full-term uncomplicated delivery [A58] The primary encounter diagnosis was 38 weeks gestation of pregnancy  A diagnosis of Bleeding of unknown origin was also pertinent to this visit  1  38 weeks gestation of pregnancy    2  Bleeding of unknown origin      DISCHARGE DATE/TIME: No discharge date for patient encounter    DISCHARGE DISPOSITION (IF DISCHARGED): Home/Self Care     MOTHER AND  INFORMATION:  Mother: Divine Alonso 1997   Delivering clinician: Tima INIGUEZ History        1    Para   1    Term   1            AB        Living   1       SAB        TAB        Ectopic        Multiple   0    Live Births   1               [place] Name & MRN:   Information for the patient's :  Merrill Lowe Girl Emmanuelerica Meckel) [[id number]]     [place] Delivery Information:  Sex: female  Delivered 2021 4:08 PM by Vaginal, Spontaneous; Gestational Age: 36w4d    [place] Measurements:  Weight: 7 lb 15 9 oz (3625 g); Height: 20"    APGAR 1 minute 5 minutes 10 minutes   Totals: 8 9       Birth Information: 27 y o  female MRN: [id number] Unit/Bed#: -01 Estimated Date of Delivery: 9/3/21  Birthweight: No birth weight on file  Gestational Age: <None> Delivery Type: Vaginal, Spontaneous          APGARS  One minute Five minutes Ten minutes   Totals:               IMPORTANT INFORMATION:  Please contact the [facility] directly with any questions or concerns regarding this request  Department voicemails are confidential     Send requests for admission clinical reviews, concurrent reviews, approvals, and administrative denials due to lack of clinical to fax [phone]
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
## 2021-08-24 NOTE — OP NOTE
OPERATIVE REPORT  PATIENT NAME: Raf Rae    :  1995  MRN: [id number]  Pt Location: [facility] L&D OR ROOM 02    SURGERY DATE: 2021    Surgeon(s) and Role:     * Lamberto Vick MD - Primary     * Desire Santiago MD - Assisting    Preop Diagnosis:  Bleeding of unknown origin [R58]    Post-Op Diagnosis Codes: * Bleeding of unknown origin [R58]    Procedure(s) (LRB):  DILATATION AND CURETTAGE (D&C) (N/A)  EXAM UNDER ANESTHESIA (EUA) WITH INSERTION OF BAKRI BALLOON (N/A)    Specimen(s):  ID Type Source Tests Collected by Time Destination   1 : retained poducts of conception  Tissue Products of Conception TISSUE EXAM Lamberto Vick MD 2021 191        Drains:  Urethral Catheter Non-latex 16 Fr  (Active)   Site Assessment Clean;Skin intact 21 2230   Collection Container Standard drainage bag 21 2230   Securement Method Securing device (Describe) 21 2230   Number of days: 1       Intrauterine postpartum balloon 21 1903 400 mL (Active)   Measured Blood Loss (mL) 125 mL 21 2230   Number of days: 1       [REMOVED] Urethral Catheter 16 Fr  (Removed)   Site Assessment Clean;Skin intact 21 0730   Collection Container Standard drainage bag 21 0730   Securement Method Securing device (Describe) 21 0730   Output (mL) 250 mL 21 1600   Number of days: 0       Anesthesia Type:   General    Operative Indications:  Bleeding of unknown origin [R58]    Operative Findings:   Retained placental membranes   Intact cervix   ALEJANDRO atony   Repaired vaginal lacerations      Total QBL: 1 6 L   Pre-op: 1115 mL   Intra-op: 599 mL    Complications:   None    Procedure and Technique:  Patient was taken to the operating room where she was properly identified and General endotracheal anesthesia (GET) was administered without difficulty  The patient was prepped and draped in the usual sterile fashion in the dorsal lithotomy position using the stirrups   Care was taken to avoid excessive flexion or extension of the hips and knees and pressure on the extremities  A time out was performed, and all were in agreement to proceed  Bimanual exam was performed with lower uterine segment atony noted  Visualization of the cervix was obtained using S retractors  The cervix was noted to be intact and was grasped with ring forceps  The endometrial cavity was cleared of all retained products via gentle sharp curettage using a banjo curette  Bimanual exam was again performed due to continued lower uterine segment atony and additional clots were removed with some improvement  The uterine fundus remained firm  Bleeding was minimal but persistent so a Bakri balloon was placed into the endometrial cavity  The balloon was insufflated with 400 cc of fluid  Bilateral vaginal lacerations previously repaired at time of delivery were noted to need additional repair  Figure of 8 stitches using 3-0 Vicryl were used to reinforce the previous repair  Excellent hemostasis was obtained  All instruments were removed from the patient's vagina  A may balloon was placed into the bladder in sterile fashion  The patient tolerated the procedure well  She was cleansed, awakened from anesthesia and taken to the recovery room in stable condition  Sponge, instrument and needle counts were correct x 2  Dr Merline Simmonds was present for the entire procedure       Patient Disposition:  PACU     SIGNATURE: Polly Bazzi MD  DATE: August 24, 2021  TIME: 12:58 AM

## 2021-08-24 NOTE — QUICK NOTE
Another 100 cc removed from Bakri balloon  Patient feels well at this time  No additional bleeding from Bakri since this morning  Will return in a few hours to remove more fluid       Vitals:    08/24/21 1234   BP: 106/55   Pulse: 95   Resp: (!) 24   Temp: 98 8 °F (37 1 °C)   SpO2:      D/w Dr Flavia Edgar MD  PGY-2 OB/GYN   8/24/2021 12:42 PM

## 2021-08-24 NOTE — PROGRESS NOTES
Progress Note - OB/GYN   Lisa Lino 27 y o  female MRN: [id number]  Unit/Bed#:  302-01 Encounter: [id number]    Assessment:  Post partum Day #1 s/p , stable, baby in room    Plan:  1  PPH   S/p pitocin, Methergine x1, TXA, cytotec, Bakri balloon with 400 cc   Hgb: 7 8-->7 7--> pending am CBC   BP: 103-129/53-74, HR: 85   Minimal bleeding and no current symptoms   Start draining Bakri balloon today  2  Continue routine post partum care   Amy in: 0 5 cc/kg/hr   Encourage ambulation   Encourage breastfeeding   Anticipate discharge PPD2     Subjective/Objective   Chief Complaint:     Post delivery  Patient is doing well  Lochia WNL  Pain well controlled       Subjective:     Pain: yes, cramping, improved with meds  Tolerating PO: yes  Voiding: may in place  Flatus: no  BM: no  Ambulating: yes  Chest pain: no  Shortness of breath: no  Leg pain: no  Lochia: minimal    Objective:     Vitals: /53 (BP Location: Left arm)   Pulse 89   Temp 98 9 °F (37 2 °C) (Oral)   Resp 16   Ht 5' 6" (1 676 m)   Wt 109 kg (240 lb)   LMP 2020 (Approximate)   SpO2 94%   Breastfeeding Unknown   BMI 38 74 kg/m²       Intake/Output Summary (Last 24 hours) at 2021 0700  Last data filed at 2021 0600  Gross per 24 hour   Intake 2856 25 ml   Output 2556 ml   Net 300 25 ml       Lab Results   Component Value Date    WBC 18 12 (H) 2021    HGB 7 7 (L) 2021    HCT 25 1 (L) 2021    MCV 78 (L) 2021     2021       Physical Exam:     Gen: AAOx3, NAD  CV: RRR  Lungs: CTA b/l  Abd: Soft, non-tender, non-distended, no rebound or guarding  Uterine fundus firm and non-tender, Bakri balloon in place   Ext: Non tender    Ketan Singletary MD  2021  7:00 AM

## 2021-08-24 NOTE — ANESTHESIA POSTPROCEDURE EVALUATION
Post-Op Assessment Note             Comments: see D&C post-op note; CNRA remoed epidural, intact  No complications documented      /73 (08/23/21 2015)    Temp      Pulse 69 (08/23/21 2015)   Resp 16 (08/23/21 2015)    SpO2 97 % (08/23/21 2015)

## 2021-08-24 NOTE — QUICK NOTE
The patient's Bakri was deflated with 20 more cc drained  Then the Bakri was removed from the patient  Her fundus is firm, bleeding under control  She remains asymptomatic  Currently receiving her blood transfusion  Will f/u labs this evening       Vitals:    08/24/21 1440   BP: 113/63   Pulse: 92   Resp: 20   Temp: 99 °F (37 2 °C)   SpO2:      D/w Dr David Dang MD  PGY-2 OB/GYN   8/24/2021 3:36 PM

## 2021-08-25 VITALS
OXYGEN SATURATION: 94 % | WEIGHT: 240 LBS | TEMPERATURE: 98.2 F | DIASTOLIC BLOOD PRESSURE: 58 MMHG | RESPIRATION RATE: 18 BRPM | HEART RATE: 67 BPM | SYSTOLIC BLOOD PRESSURE: 116 MMHG | HEIGHT: 66 IN | BODY MASS INDEX: 38.57 KG/M2

## 2021-08-25 PROBLEM — Z34.03 ENCOUNTER FOR SUPERVISION OF NORMAL FIRST PREGNANCY IN THIRD TRIMESTER: Status: RESOLVED | Noted: 2021-04-01 | Resolved: 2021-08-25

## 2021-08-25 LAB
ABO GROUP BLD BPU: NORMAL
ABO GROUP BLD BPU: NORMAL
BPU ID: NORMAL
BPU ID: NORMAL
CROSSMATCH: NORMAL
CROSSMATCH: NORMAL
UNIT DISPENSE STATUS: NORMAL
UNIT DISPENSE STATUS: NORMAL
UNIT PRODUCT CODE: NORMAL
UNIT PRODUCT CODE: NORMAL
UNIT PRODUCT VOLUME: 350 ML
UNIT PRODUCT VOLUME: 350 ML
UNIT RH: NORMAL
UNIT RH: NORMAL

## 2021-08-25 PROCEDURE — 99024 POSTOP FOLLOW-UP VISIT: CPT | Performed by: OBSTETRICS & GYNECOLOGY

## 2021-08-25 RX ORDER — CALCIUM CARBONATE 200(500)MG
1000 TABLET,CHEWABLE ORAL 3 TIMES DAILY PRN
Refills: 0
Start: 2021-08-25 | End: 2021-09-29 | Stop reason: ALTCHOICE

## 2021-08-25 RX ORDER — IBUPROFEN 600 MG/1
600 TABLET ORAL EVERY 6 HOURS PRN
Qty: 30 TABLET | Refills: 0
Start: 2021-08-25 | End: 2021-09-29 | Stop reason: ALTCHOICE

## 2021-08-25 RX ORDER — ACETAMINOPHEN 325 MG/1
650 TABLET ORAL EVERY 6 HOURS PRN
Refills: 0
Start: 2021-08-25 | End: 2022-07-09

## 2021-08-25 RX ADMIN — IBUPROFEN 600 MG: 600 TABLET ORAL at 08:09

## 2021-08-25 RX ADMIN — SERTRALINE HYDROCHLORIDE 50 MG: 50 TABLET ORAL at 09:21

## 2021-08-25 NOTE — DISCHARGE INSTRUCTIONS
Self Care After Delivery   AMBULATORY CARE:   The postpartum period  is the period of time from delivery to about 6 weeks  During this time you may experience many physical and emotional changes  It is important to understand what is normal and when you need to call your healthcare provider  It is also important to know how to care for yourself during this time  Call your local emergency number (911 in the [address] [facility]) for any of the following:   · You see or hear things that are not there, or have thoughts of harming yourself or your baby  · You soak through 1 pad in 15 minutes, have blurry vision, clammy or pale skin, and feel faint  · You faint or lose consciousness  · You have trouble breathing  · You cough up blood  · Your  incision comes apart  Seek care immediately if:   · Your heart is beating faster than usual     · You have a bad headache or changes in your vision  · Your episiotomy or  incision is red, swollen, bleeding, or draining pus  · You have severe abdominal pain  Call your doctor or obstetrician if:   · Your leg is painful, red, and larger than usual     · You soak through 1 or more pads in an hour, or pass blood clots larger than a quarter from your vagina  · You have a fever  · You have new or worsening pain in your abdomen or vagina  · You continue to have depression 1 to 2 weeks after you deliver  · You have trouble sleeping  · You have foul-smelling discharge from your vagina  · You have pain or burning when you urinate  · You do not have a bowel movement for 3 days or more  · You have nausea or are vomiting  · You have hard lumps or red streaks over your breasts  · You have cracked nipples or bleed from your nipples  · You have questions or concerns about your condition or care  Physical changes:   The following are normal changes after you give birth:  · Pain in the area between your anus and vagina    · Breast pain    · Constipation or hemorrhoids    · Hot or cold flashes    · Vaginal bleeding or discharge    · Mild to moderate abdominal cramping    · Difficulty controlling bowel movements or urine    Emotional changes:  A drop in hormone levels after you deliver may cause changes in your emotions  You may feel irritable, sad, or anxious  You may cry easily or for no reason  You may also feel depressed  Depression that continues can be a sign of postpartum depression, a condition that can be treated  Treatment may include talk therapy, medicines, or both  Healthcare providers will ask how you are feeling and if you have any depression  These talks can happen during appointments for your medical care and for your baby's care, such as well child visits  Providers can help you find ways to care for yourself and your baby  Talk to your providers about the following:  · When emotional changes or depression started, and if it is getting worse over time    · Problems you are having with daily activities, sleep, or caring for your baby    · If anything makes you feel worse, or makes you feel better    · Feeling that you are not bonding with your baby the way you want    · Any problems your baby has with sleeping or feeding    · Your baby is fussy or cries a lot    · Support you have from friends, family, or others    Breast care for breastfeeding mothers: You may have sore breasts for 3 to 6 days after you give birth  This happens as your milk begins to fill your breasts  You may also have sore breasts if you do not breastfeed frequently  Do the following to care for your breasts:  · Apply a moist, warm, compress to your breast as directed  This may help soothe your breasts  Make sure the washcloth is not too hot before you apply it to your breast     · Nurse your baby or pump your milk frequently  This may prevent clogged milk ducts  Ask your healthcare provider how often to nurse or pump      · Massage your breasts as directed  This may help increase your milk flow  Gently rub your breasts in a circular motion before you breastfeed  You may need to gently squeeze your breast or nipple to help release milk  You can also use a breast pump to help release milk from your breast     · Wash your breasts with warm water only  Do not put soap on your nipples  Soap may cause your nipples to become dry  · Apply lanolin cream to your nipples as directed  Lanolin cream may add moisture to your skin and prevent nipple dryness  Always  wash off lanolin cream with warm water before you breastfeed  · Place pads in your bra  Your nipples may leak milk when you are not breastfeeding  You can place pads inside of your bra to help prevent leaking onto your clothing  Ask your healthcare provider where to purchase bra pads  · Get breastfeeding support if needed  Healthcare providers can answer questions about breastfeeding and provide you with support  Ask your healthcare provider who you can contact if you need breastfeeding support  Breast care for non-breastfeeding mothers:  Milk will fill your breasts even if you bottle feed your baby  Do the following to help stop your milk from filling your breasts and causing pain:  · Wear a bra with support at all times  A sports bra or a tight-fitting bra will help stop your milk from coming in  · Apply ice on each breast for 15 to 20 minutes every hour or as directed  Use an ice pack, or put crushed ice in a plastic bag  Cover it with a towel before you apply it to your breast  Ice helps your milk ducts shrink  · Keep your breasts away from warm water  Warm water will make it easier for milk to fill your breasts  Stand with your breasts away from warm water in the shower  · Limit how much you touch your breasts  This will prevent them from filling with milk  Perineum care: Your perineum is the area between your rectum and vagina   It is normal to have swelling and pain in this area after you give birth  If you had an episiotomy, your healthcare provider may give you special instructions  · Clean your perineum after you use the bathroom  This may prevent infection and help with healing  Use a spray bottle with warm water to clean your perineum  You may also gently spray warm water against your perineum when you urinate  Always wipe front to back  · Take a sitz bath as directed  A sitz bath may help relieve swelling and pain  Fill your bath tub or bucket with water up to your hips and sit in the water  Use cold water for 2 days after you deliver  Then use warm water  Ask your healthcare provider for more information about a sitz bath  · Apply ice packs for the first 24 hours or as directed  Use a plastic glove filled with ice or buy an ice pack  Wrap the ice pack or plastic glove in a small towel or wash cloth  Place the ice pack on your perineum for 20 minutes at a time  · Sit on a donut-shaped pillow  This may relieve pressure on your perineum when you sit  · Use wipes that contain medicine or take pills as directed  Your healthcare provider may tell you to use witch hazel pads  You can place witch hazel pads in the refrigerator before you apply them to your perineum  Your provider may also tell you to take NSAIDs  Ask him or her how often to take pills or use the wipes  · Do not go swimming or take tub baths for 4 to 6 weeks or as directed  This will help prevent an infection in your vagina or uterus  Bowel and bladder care: It may take 3 to 5 days to have a bowel movement after you deliver your baby  You can do the following to prevent or manage constipation, and get control of your bowel or bladder:  · Take stool softeners as directed  A stool softener is medicine that will make your bowel movements softer  This may prevent or relieve constipation  A stool softener may also make bowel movements less painful  · Drink plenty of liquids    Ask how much liquid to drink each day and which liquids are best for you  Liquids may help prevent constipation  · Eat foods high in fiber  Examples include fruits, vegetables, grains, beans, and lentils  Ask your healthcare provider how much fiber you need each day  Fiber may prevent constipation  · Do Kegel exercises as directed  Kegel exercises will help strengthen the muscles that control bowel movements and urination  Ask your healthcare provider for more information on Kegel exercises  · Apply cold compresses or medicine to hemorrhoids as directed  This may relieve swelling and pain  Your healthcare provider may tell you to apply ice or wipes that contain medicine to your hemorrhoids  He or she may also tell you to use a sitz bath  Ask your provider for more information on how to manage hemorrhoids  Nutrition:  Good nutrition is important in the postpartum period  It will help you return to a healthy weight, increase your energy levels, and prevent constipation  It will also help you get enough nutrients and calories if you are going to breastfeed your baby  · Eat a variety of healthy foods  Healthy foods include fruits, vegetables, whole-grain breads, low-fat dairy products, beans, lean meats, and fish  You may need 500 to 700 extra calories each day if you breastfeed your baby  You may also need extra protein  · Limit foods with added sugar and high amounts of fat  These foods are high in calories and low in healthy nutrients  Read food labels so you know how much sugar and fat is in the food you want to eat  · Drink 8 to 10 glasses of water per day  Water will help you make plenty of milk for your baby  It will also help prevent constipation  Drink a glass of water every time you breastfeed your baby  · Take vitamins as directed  Ask your healthcare provider what vitamins you need  · Limit caffeine and alcohol if you are breastfeeding    Caffeine and alcohol can get into your breast milk  Caffeine and alcohol can make your baby fussy  They can also interfere with your baby's sleep  Ask your healthcare provider if you can drink alcohol or caffeine  Rest and sleep: You may feel very tired in the postpartum period  Enough sleep will help you heal and give you energy to care for your baby  The following may help you get sleep and rest:  · Nap when your baby naps  Your baby may nap several times during the day  Get rest during this time  · Limit visitors  Many people may want to see you and your baby  Ask friends or family to visit on different days  This will give you time to rest     · Do not plan too much for one day  Put off household chores so that you have time to rest  Gradually do more each day  · Ask for help from family, friends, or neighbors  Ask them to help you with laundry, cleaning, or errands  Also ask someone to watch the baby while you take a nap or relax  Ask your partner to help with the care of your baby  Pump some of your breast milk so your partner can feed your baby during the night  Exercise after delivery:  Wait until your healthcare provider says it is okay to exercise  Exercise can help you lose weight, increase your energy levels, and manage your mood  It can also prevent constipation and blood clots  Start with gentle exercises such as walking  Do more as you have more energy  You may need to avoid abdominal exercises for 1 to 2 weeks after you deliver  Talk to your healthcare provider about an exercise plan that is right for you  Sexual activity after delivery:   · Do not have sex until your healthcare provider says it is okay  You may need to wait 4 to 6 weeks before you have sex  This may prevent infection and allow time to heal     · Your menstrual cycle may begin as soon as 3 weeks after you deliver  Your period may be delayed if you breastfeed your baby  You can become pregnant before you get your first postpartum period   Talk to your healthcare provider about birth control that is right for you  Some types of birth control are not safe during breastfeeding  For support and more information:  Join a support group for new mothers  Ask for help from family and friends with chores, errands, and care of your baby  · Office of Women's Health,  Department of Health and Human Services  [address], [address] [facility] [place] [address]  [address], [address] [facility] [place] [address]  Phone: 7- [phone]  Web Address: www womenshealth gov  · [facility] Postpartum [address] [facility] , [address]  [address] [facility] , [address] [facility]  Web Address: [employer] be  [employer]/pregnancy/postpartum-care  aspx  Follow up with your doctor or obstetrician as directed: You will need to follow up within 2 to 6 weeks of delivery  Write down your questions so you remember to ask them at your visits  © Copyright [employer] 2021 Information is for End User's use only and may not be sold, redistributed or otherwise used for commercial purposes  All illustrations and images included in CareNotes® are the copyrighted property of [employer]  or [facility] Agueda Valdez   The above information is an  only  It is not intended as medical advice for individual conditions or treatments  Talk to your doctor, nurse or pharmacist before following any medical regimen to see if it is safe and effective for you

## 2021-08-25 NOTE — PLAN OF CARE
Problem: PAIN - ADULT  Goal: Verbalizes/displays adequate comfort level or baseline comfort level  Description: Interventions:  - Encourage patient to monitor pain and request assistance  - Assess pain using appropriate pain scale  - Administer analgesics based on type and severity of pain and evaluate response  - Implement non-pharmacological measures as appropriate and evaluate response  - Consider cultural and social influences on pain and pain management  - Notify physician/advanced practitioner if interventions unsuccessful or patient reports new pain  Outcome: Progressing     Problem: INFECTION - ADULT  Goal: Absence or prevention of progression during hospitalization  Description: INTERVENTIONS:  - Assess and monitor for signs and symptoms of infection  - Monitor lab/diagnostic results  - Monitor all insertion sites, i e  indwelling lines, tubes, and drains  - Monitor endotracheal if appropriate and nasal secretions for changes in amount and color  - [place] appropriate cooling/warming therapies per order  - Administer medications as ordered  - Instruct and encourage patient and family to use good hand hygiene technique  - Identify and instruct in appropriate isolation precautions for identified infection/condition  Outcome: Progressing  Goal: Absence of fever/infection during neutropenic period  Description: INTERVENTIONS:  - Monitor WBC    Outcome: Progressing     Problem: SAFETY ADULT  Goal: Patient will remain free of falls  Description: INTERVENTIONS:  - Educate patient/family on patient safety including physical limitations  - Instruct patient to call for assistance with activity   - Consult OT/PT to assist with strengthening/mobility   - Keep Call bell within reach  - Keep bed low and locked with side rails adjusted as appropriate  - Keep care items and personal belongings within reach  - Initiate and maintain comfort rounds  - Make Fall Risk Sign visible to staff  - Offer Toileting every  Hours, in advance of need  - Initiate/Maintain alarm  - Obtain necessary fall risk management equipment:   - Apply yellow socks and bracelet for high fall risk patients  - Consider moving patient to room near nurses station  Outcome: Progressing  Goal: Maintain or return to baseline ADL function  Description: INTERVENTIONS:  -  Assess patient's ability to carry out ADLs; assess patient's baseline for ADL function and identify physical deficits which impact ability to perform ADLs (bathing, care of mouth/teeth, toileting, grooming, dressing, etc )  - Assess/evaluate cause of self-care deficits   - Assess range of motion  - Assess patient's mobility; develop plan if impaired  - Assess patient's need for assistive devices and provide as appropriate  - Encourage maximum independence but intervene and supervise when necessary  - Involve family in performance of ADLs  - Assess for home care needs following discharge   - Consider OT consult to assist with ADL evaluation and planning for discharge  - Provide patient education as appropriate  Outcome: Progressing  Goal: Maintains/Returns to pre admission functional level  Description: INTERVENTIONS:  - Perform BMAT or MOVE assessment daily    - Set and communicate daily mobility goal to care team and patient/family/caregiver  - Collaborate with rehabilitation services on mobility goals if consulted  - Perform Range of Motion  times a day  - Reposition patient every  hours    - Dangle patient  times a day  - Stand patient  times a day  - Ambulate patient  times a day  - Out of bed to chair  times a day   - Out of bed for meals  times a day  - Out of bed for toileting  - Record patient progress and toleration of activity level   Outcome: Progressing     Problem: DISCHARGE PLANNING  Goal: Discharge to home or other facility with appropriate resources  Description: INTERVENTIONS:  - Identify barriers to discharge w/patient and caregiver  - Arrange for needed discharge resources and transportation as appropriate  - Identify discharge learning needs (meds, wound care, etc )  - Arrange for interpretive services to assist at discharge as needed  - Refer to Case Management Department for coordinating discharge planning if the patient needs post-hospital services based on physician/advanced practitioner order or complex needs related to functional status, cognitive ability, or social support system  Outcome: Progressing     Problem: Potential for Falls  Goal: Patient will remain free of falls  Description: INTERVENTIONS:  - Educate patient/family on patient safety including physical limitations  - Instruct patient to call for assistance with activity   - Consult OT/PT to assist with strengthening/mobility   - Keep Call bell within reach  - Keep bed low and locked with side rails adjusted as appropriate  - Keep care items and personal belongings within reach  - Initiate and maintain comfort rounds  - Make Fall Risk Sign visible to staff  - Offer Toileting every  Hours, in advance of need  - Initiate/Maintain alarm  - Obtain necessary fall risk management equipment:   - Apply yellow socks and bracelet for high fall risk patients  - Consider moving patient to room near nurses station  Outcome: Progressing     Problem: POSTPARTUM  Goal: Experiences normal postpartum course  Description: INTERVENTIONS:  - Monitor maternal vital signs  - Assess uterine involution and lochia  Outcome: Progressing  Goal: Appropriate maternal -  bonding  Description: INTERVENTIONS:  - Identify family support  - Assess for appropriate maternal/infant bonding   -Encourage maternal/infant bonding opportunities  - Referral to  or  as needed  Outcome: Progressing  Goal: Establishment of infant feeding pattern  Description: INTERVENTIONS:  - Assess breast/bottle feeding  - Refer to lactation as needed  Outcome: Progressing  Goal: Incision(s), wounds(s) or drain site(s) healing without S/S of infection  Description: INTERVENTIONS  - Assess and document dressing, incision, wound bed, drain sites and surrounding tissue  - Provide patient and family education  - Perform skin care/dressing changes every   Outcome: Progressing

## 2021-08-25 NOTE — PROGRESS NOTES
Progress Note - OB/GYN   Kina Capone 27 y o  female MRN: [id number]  Unit/Bed#:  302-01 Encounter: [id number]    Assessment:  Post partum Day #2 s/p , stable, baby in room    Plan:  1  PPH   S/p pitocin, Methergine x1, TXA, cytotec, D/C and Bakri balloon   Hgb: 7 8-->7 7--> 6 7--> 2u pRBCs--> 9 0   S/p Bakri balloon   BP: 102-1115/58-74, HR: 85   Minimal bleeding and no current symptoms  2  III   S/p Unasyn for 24h post D/C     2  Continue routine post partum care   Passed VT   Encourage ambulation   Encourage breastfeeding   Discharge home today    Subjective/Objective   Chief Complaint:     Post delivery  Patient is doing well  Lochia WNL  Pain well controlled       Subjective:     Pain: yes, cramping, improved with meds  Tolerating PO: yes  Voiding: yes  Flatus: yes  BM: yes  Ambulating: yes  Chest pain: no  Shortness of breath: no  Leg pain: no  Lochia: minimal    Objective:     Vitals: /60   Pulse 85   Temp 98 6 °F (37 °C) (Oral)   Resp 16   Ht 5' 6" (1 676 m)   Wt 109 kg (240 lb)   LMP 2020 (Approximate)   SpO2 94%   Breastfeeding Yes   BMI 38 74 kg/m²       Intake/Output Summary (Last 24 hours) at 2021 0551  Last data filed at 2021 0341  Gross per 24 hour   Intake 700 ml   Output 3600 ml   Net -2900 ml       Lab Results   Component Value Date    WBC 11 35 (H) 2021    HGB 9 0 (L) 2021    HCT 28 4 (L) 2021    MCV 81 (L) 2021     2021       Physical Exam:     Gen: AAOx3, NAD  CV: RRR  Lungs: CTA b/l  Abd: Soft, non-tender, non-distended, no rebound or guarding  Uterine fundus firm and non-tender, fundus at the umbilicus  Ext: Non tender    Brook Oakes MD  2021  5:51 AM

## 2021-08-25 NOTE — ASSESSMENT & PLAN NOTE
PPD #2    Routine postpartum care - bleeding minimal  No evidence of infection, encourage ambulation, breastfeeding support  Discharge home today with follow up visit 2-3 weeks

## 2021-08-25 NOTE — ASSESSMENT & PLAN NOTE
Hemoglobin now 9 0  Recommend continue outpatient vitamin supplementation, hydration and rest  Reviewed signs and symptoms of anemia    S/p Bakri balloon, pitocin, methergine, TXA, cytotec  D&C

## 2021-08-25 NOTE — LACTATION NOTE
This note was copied from a baby's chart  Discharge Lactation: mom states breastfeeding is going well  Discharge education provided  Paced bottle feeding, milk storage and cluster feeding also discussed  Met with mother to go over discharge breastfeeding booklet including the feeding log  Emphasized 8 or more (12) feedings in a 24 hour period, what to expect for the number of diapers per day of life and the progression of properties of the  stooling pattern  Reviewed breastfeeding and your lifestyle, storage and preparation of breast milk, how to keep you breast pump clean, the employed breastfeeding mother and paced bottle feeding handouts  Booklet included Breastfeeding Resources for after discharge including access to the number for the [address]  Discussed risks for early supplementation: over feeding, longer digestion times, engorgement for mom, lower milk supply for mom, and nipple confusion  Benefits of breast feeding for infant's intestinal tract, less engorgement for mom, protection from multiple disease processes as infant develops, avoidance of over feeding for infant, less nipple confusion, and increased health benefits for mom  Feed expressed milk or formula as needed/desired  Paced bottle feeding technique is less stressful for your baby, prevents overfeeding and protects the breastfeeding relationship  You can find a video about paced bottle feeding at www lacted  org    Provided education on growth spurts, when to introduce bottles; paced bottle feeding, and non-nutritive suck at the breast  Provided education on Signs of satiation  Encouraged to call lactation to observe a latch prior to discharge for reassurance  Encouraged to call baby and me with any questions and closely monitor output

## 2021-08-25 NOTE — PLAN OF CARE
Problem: PAIN - ADULT  Goal: Verbalizes/displays adequate comfort level or baseline comfort level  Description: Interventions:  - Encourage patient to monitor pain and request assistance  - Assess pain using appropriate pain scale  - Administer analgesics based on type and severity of pain and evaluate response  - Implement non-pharmacological measures as appropriate and evaluate response  - Consider cultural and social influences on pain and pain management  - Notify physician/advanced practitioner if interventions unsuccessful or patient reports new pain  8/25/2021 1240 by Levi Peacock RN  Outcome: Adequate for Discharge  8/25/2021 1240 by Levi Peacock RN  Outcome: Adequate for Discharge  8/25/2021 0941 by Levi Peacock RN  Outcome: Progressing     Problem: INFECTION - ADULT  Goal: Absence or prevention of progression during hospitalization  Description: INTERVENTIONS:  - Assess and monitor for signs and symptoms of infection  - Monitor lab/diagnostic results  - Monitor all insertion sites, i e  indwelling lines, tubes, and drains  - Monitor endotracheal if appropriate and nasal secretions for changes in amount and color  - [place] appropriate cooling/warming therapies per order  - Administer medications as ordered  - Instruct and encourage patient and family to use good hand hygiene technique  - Identify and instruct in appropriate isolation precautions for identified infection/condition  8/25/2021 1240 by Levi Peacock RN  Outcome: Adequate for Discharge  8/25/2021 1240 by Levi Peacock RN  Outcome: Adequate for Discharge  8/25/2021 0941 by Levi Peacock RN  Outcome: Progressing  Goal: Absence of fever/infection during neutropenic period  Description: INTERVENTIONS:  - Monitor WBC    8/25/2021 1240 by Levi Peacock RN  Outcome: Adequate for Discharge  8/25/2021 1240 by Levi Peacock RN  Outcome: Adequate for Discharge  8/25/2021 0941 by Levi Peacock RN  Outcome: Progressing Problem: SAFETY ADULT  Goal: Patient will remain free of falls  Description: INTERVENTIONS:  - Educate patient/family on patient safety including physical limitations  - Instruct patient to call for assistance with activity   - Consult OT/PT to assist with strengthening/mobility   - Keep Call bell within reach  - Keep bed low and locked with side rails adjusted as appropriate  - Keep care items and personal belongings within reach  - Initiate and maintain comfort rounds  - Make Fall Risk Sign visible to staff  - Offer Toileting every  Hours, in advance of need  - Initiate/Maintain alarm  - Obtain necessary fall risk management equipment:   - Apply yellow socks and bracelet for high fall risk patients  - Consider moving patient to room near nurses station  8/25/2021 1240 by Jada Lira RN  Outcome: Adequate for Discharge  8/25/2021 1240 by Jada Lira RN  Outcome: Adequate for Discharge  8/25/2021 0941 by Jada Lira RN  Outcome: Progressing  Goal: Maintain or return to baseline ADL function  Description: INTERVENTIONS:  -  Assess patient's ability to carry out ADLs; assess patient's baseline for ADL function and identify physical deficits which impact ability to perform ADLs (bathing, care of mouth/teeth, toileting, grooming, dressing, etc )  - Assess/evaluate cause of self-care deficits   - Assess range of motion  - Assess patient's mobility; develop plan if impaired  - Assess patient's need for assistive devices and provide as appropriate  - Encourage maximum independence but intervene and supervise when necessary  - Involve family in performance of ADLs  - Assess for home care needs following discharge   - Consider OT consult to assist with ADL evaluation and planning for discharge  - Provide patient education as appropriate  8/25/2021 1240 by Jada Lira RN  Outcome: Adequate for Discharge  8/25/2021 1240 by Jada Lira RN  Outcome: Adequate for Discharge  8/25/2021 0941 by Dixie Funk Reynolds, RN  Outcome: Progressing  Goal: Maintains/Returns to pre admission functional level  Description: INTERVENTIONS:  - Perform BMAT or MOVE assessment daily    - Set and communicate daily mobility goal to care team and patient/family/caregiver  - Collaborate with rehabilitation services on mobility goals if consulted  - Perform Range of Motion  times a day  - Reposition patient every  hours    - Dangle patient  times a day  - Stand patient  times a day  - Ambulate patient  times a day  - Out of bed to chair  times a day   - Out of bed for meals  times a day  - Out of bed for toileting  - Record patient progress and toleration of activity level   8/25/2021 1240 by Evelyn Raphael RN  Outcome: Adequate for Discharge  8/25/2021 1240 by Evelyn Raphael RN  Outcome: Adequate for Discharge  8/25/2021 0941 by Evelyn Raphael RN  Outcome: Progressing     Problem: DISCHARGE PLANNING  Goal: Discharge to home or other facility with appropriate resources  Description: INTERVENTIONS:  - Identify barriers to discharge w/patient and caregiver  - Arrange for needed discharge resources and transportation as appropriate  - Identify discharge learning needs (meds, wound care, etc )  - Arrange for interpretive services to assist at discharge as needed  - Refer to Case Management Department for coordinating discharge planning if the patient needs post-hospital services based on physician/advanced practitioner order or complex needs related to functional status, cognitive ability, or social support system  8/25/2021 1240 by Evelyn Raphael RN  Outcome: Adequate for Discharge  8/25/2021 1240 by Evelyn Raphael RN  Outcome: Adequate for Discharge  8/25/2021 0941 by Evelyn Raphael RN  Outcome: Progressing     Problem: Potential for Falls  Goal: Patient will remain free of falls  Description: INTERVENTIONS:  - Educate patient/family on patient safety including physical limitations  - Instruct patient to call for assistance with activity   - Consult OT/PT to assist with strengthening/mobility   - Keep Call bell within reach  - Keep bed low and locked with side rails adjusted as appropriate  - Keep care items and personal belongings within reach  - Initiate and maintain comfort rounds  - Make Fall Risk Sign visible to staff  - Offer Toileting every  Hours, in advance of need  - Initiate/Maintain alarm  - Obtain necessary fall risk management equipment:   - Apply yellow socks and bracelet for high fall risk patients  - Consider moving patient to room near nurses station  2021 1240 by Della Mojica RN  Outcome: Adequate for Discharge  2021 1240 by Della Mojica RN  Outcome: Adequate for Discharge  2021 0941 by Della Mojica RN  Outcome: Progressing     Problem: POSTPARTUM  Goal: Experiences normal postpartum course  Description: INTERVENTIONS:  - Monitor maternal vital signs  - Assess uterine involution and lochia  2021 1240 by Della Mojica RN  Outcome: Adequate for Discharge  2021 1240 by Della Mojica RN  Outcome: Adequate for Discharge  2021 0941 by Della Mojica RN  Outcome: Progressing  Goal: Appropriate maternal -  bonding  Description: INTERVENTIONS:  - Identify family support  - Assess for appropriate maternal/infant bonding   -Encourage maternal/infant bonding opportunities  - Referral to  or  as needed  2021 1240 by Della Mojica RN  Outcome: Adequate for Discharge  2021 1240 by Della Mojica RN  Outcome: Adequate for Discharge  2021 0941 by Della Mojica RN  Outcome: Progressing  Goal: Establishment of infant feeding pattern  Description: INTERVENTIONS:  - Assess breast/bottle feeding  - Refer to lactation as needed  2021 1240 by Della Mojica RN  Outcome: Adequate for Discharge  2021 1240 by Della Mojica RN  Outcome: Adequate for Discharge  2021 0941 by Della Mojica RN  Outcome: Progressing  Goal: Incision(s), wounds(s) or drain site(s) healing without S/S of infection  Description: INTERVENTIONS  - Assess and document dressing, incision, wound bed, drain sites and surrounding tissue  - Provide patient and family education  - Perform skin care/dressing changes every   8/25/2021 1240 by Della Mojica RN  Outcome: Adequate for Discharge  8/25/2021 1240 by Della Mojica RN  Outcome: Adequate for Discharge  8/25/2021 0941 by Della Mojica RN  Outcome: Progressing

## 2021-08-26 NOTE — UTILIZATION REVIEW
Notification of Discharge   This is a Notification of Discharge from our facility [address]  Please be advised that this patient has been discharge from our facility  Below you will find the admission and discharge date and time including the patients disposition  UTILIZATION REVIEW CONTACT:  Driss Yepez  Utilization   Network Utilization Review Department  Phone: [phone] x carefully listen to the prompts  All voicemails are confidential   Email: [email or web address]     PHYSICIAN ADVISORY SERVICES:  FOR [id number] REVIEW - MEDICAL NECESSITY DENIAL  Phone: [phone]  Fax: [phone]  Email: [email or web address]     PRESENTATION DATE: 8/21/2021 11:23 PM  OBERVATION ADMISSION DATE:   INPATIENT ADMISSION DATE: 8/22/21  9:33 AM   DISCHARGE DATE: 8/25/2021  1:50 PM  DISPOSITION: Home/Self Care Home/Self Care      IMPORTANT INFORMATION:  Send all requests for admission clinical reviews, approved or denied determinations and any other requests to dedicated fax number below belonging to the campus where the patient is receiving treatment   List of dedicated fax numbers:  [address] DENIALS (Administrative/Medical Necessity) [phone]   [address] (Maternity/NICU/Pediatrics) [phone]   Alba Sterling [phone]   Amrita Green [phone]   Moshe Flores [phone]   [address] [phone]   [facility]  [phone]   [address]  [address] [facility] [address] [facility] [phone]

## 2021-08-26 NOTE — UTILIZATION REVIEW
Inpatient Admission Authorization Request   Notification of Maternity/Delivery &  Birth Information for Admission   SERVICING FACILITY:   [address]  Tax ID: [id number]  NPI: [id number]  Place of Service: Inpatient [facility]  [facility]  Place of Service Code: 24     ATTENDING PROVIDER:  Attending Name and NPI#: Aniyah Horta [[id number]]  Address: Da Evans  [address]  Phone: [phone]     UTILIZATION REVIEW CONTACT:  Judge Grajeda Utilization   Network Utilization Review Department  Phone: [phone]  Fax [phone]  Email: [email or web address]     PHYSICIAN ADVISORY SERVICES:  FOR [id number] REVIEW - MEDICAL NECESSITY DENIAL  Phone: [phone]  Fax: [phone]  Email: [email or web address]     TYPE OF REQUEST:  Inpatient Status     ADMISSION INFORMATION:  ADMISSION DATE/TIME: 21  9:33 AM  PATIENT DIAGNOSIS CODE/DESCRIPTION:  Pregnancy [Z34 90]  38 weeks gestation of pregnancy [Z3A 38]  Encounter for full-term uncomplicated delivery [E07] The primary encounter diagnosis was 38 weeks gestation of pregnancy  Diagnoses of Bleeding of unknown origin and Spontaneous vaginal delivery were also pertinent to this visit  1  38 weeks gestation of pregnancy    2  Bleeding of unknown origin    3   Spontaneous vaginal delivery      DISCHARGE DATE/TIME: 2021  1:50 PM  DISCHARGE DISPOSITION (IF DISCHARGED): Home/Self Care     MOTHER AND  INFORMATION:  Mother: Connie Godoy 1997   Delivering clinician: Tima INIGUEZ History        1    Para   1    Term   1            AB        Living   1       SAB        TAB        Ectopic        Multiple   0    Live Births   1                Name & MRN:   Information for the patient's :  Page Destin [[id number]]      Delivery Information:  Sex: female  Delivered 2021 4:08 PM by Vaginal, Spontaneous; Gestational Age: 36w4d    Chandler Measurements:  Weight: 7 lb 15 9 oz (3625 g); Height: 20"    APGAR 1 minute 5 minutes 10 minutes   Totals: 8 9      Chandler Birth Information: 27 y o  female MRN: [id number] Unit/Bed#: -01 Estimated Date of Delivery: 9/3/21  Birthweight: No birth weight on file  Gestational Age: <None> Delivery Type: Vaginal, Spontaneous          APGARS  One minute Five minutes Ten minutes   Totals:               IMPORTANT INFORMATION:  Please contact the [facility] directly with any questions or concerns regarding this request  Department voicemails are confidential     Send requests for admission clinical reviews, concurrent reviews, approvals, and administrative denials due to lack of clinical to fax [phone]

## 2021-08-27 ENCOUNTER — TELEPHONE (OUTPATIENT)
Dept: OBGYN CLINIC | Facility: CLINIC | Age: 26
End: 2021-08-27

## 2021-08-27 NOTE — TELEPHONE ENCOUNTER
Pt called with c/o excessive diarrhea   Pt is 4 days PP vag  Delivery & PO 4 days D & C , related to excessive blding PP, &  retained placenta  Pt states she was given colace in hospital & has a hx of colitis  She has had excessive diarrhea for 2 days  Call sent to Dr Amado Keller, on- call physician  Per Dr Amado Keller, pt to try immodium for now & to f/u with GI Dr Stacie Pryor pt & informed  Pt states "she does not have a GI dr & was just told she has colitis a few years ago " advised pt take immodium as directed, increase water, gatorade to replace electrolytes  If not effective for treating diarrhea, advised pt to call tomorrow & talk with on-call Dr Kang Chapa verbalized understanding

## 2021-09-15 ENCOUNTER — TELEPHONE (OUTPATIENT)
Dept: OBGYN CLINIC | Facility: CLINIC | Age: 26
End: 2021-09-15

## 2021-09-15 ENCOUNTER — POSTPARTUM VISIT (OUTPATIENT)
Dept: OBGYN CLINIC | Facility: CLINIC | Age: 26
End: 2021-09-15

## 2021-09-15 VITALS
BODY MASS INDEX: 33.78 KG/M2 | DIASTOLIC BLOOD PRESSURE: 62 MMHG | WEIGHT: 210.2 LBS | HEIGHT: 66 IN | SYSTOLIC BLOOD PRESSURE: 106 MMHG

## 2021-09-15 PROCEDURE — 99024 POSTOP FOLLOW-UP VISIT: CPT | Performed by: OBSTETRICS & GYNECOLOGY

## 2021-09-15 NOTE — TELEPHONE ENCOUNTER
Patient is s/p vaginal delivery 8/23/21 with D& C shortly after delivery due to  excessive blding , &  retained placenta  Received 2 units of PRBC's  H/H upon discharge was 9/28  4  Patient reports that bleeding has become heavier, red with large clots today, size of a plum  Changing pad every 3-4 hours  Denies c/p and sob  Feels slightly lightheaded  She has also had excessive diarrhea since delivery as well and was advised by  Dr Dylan Coyne initially to try Immodium  Has history of colitis  Will route to on call provider to advise  Patient has not had post partum visit as of yet, has not had time to schedule  Routing to on call provider

## 2021-09-15 NOTE — PROGRESS NOTES
OB POSTPARTUM VISIT PROGRESS NOTE  Date of Encounter: 9/15/2021    Anastacio Kirkpatrick    : 1995  (27 y o )  MR: [id number]    Subjective   Haylee Moreno is in for her postpartum visit  She is now   She delivered by  complicated by PPH secondary to retained POC    She  x 4 days  She's generally doing well but has had diarrhea since delivery (h/o colitis)  Her lochia was beginning to lighten but then picked up again the last few days  + mucousy  Passed plum-sized clot x 1  Mild cramps  Objective   EXAM:    VITALS: Blood pressure 106/62, height 5' 6" (1 676 m), weight 95 3 kg (210 lb 3 2 oz), last menstrual period 2020, not currently breastfeeding  BMI: Body mass index is 33 93 kg/m²  Physical Exam  Genitourinary:      Vulva and uterus normal       Vaginal bleeding present  Cervix is parous  Uterus is not tender  HENT:      Head: Normocephalic  Cardiovascular:      Rate and Rhythm: Normal rate and regular rhythm  Pulmonary:      Effort: Pulmonary effort is normal    Abdominal:      General: There is no distension  Palpations: Abdomen is soft  Tenderness: There is no abdominal tenderness  Musculoskeletal:         General: No swelling  Neurological:      General: No focal deficit present  Mental Status: She is alert and oriented to person, place, and time  Skin:     General: Skin is warm and dry  Coloration: Skin is not pale  Psychiatric:         Mood and Affect: Mood normal          Behavior: Behavior normal    Vitals reviewed         Assessment/Plan   H/o PPH  Probable menses    Bleeding precautions reviewed  Cervix closed and uterus appropriate size; doubt retained POC or subinvolution  CBC ordered  RTO 1-2 weeks    Shawna Hwang MD

## 2021-09-15 NOTE — TELEPHONE ENCOUNTER
Can we get her in for a visit with MD for US? If not ER may be best to expedite evaluation  If coming for visit and able to prior recommend CBC prior  Can start NSAIDs scheduled for bleeding while awaiting appt or ER visit

## 2021-09-29 ENCOUNTER — POSTPARTUM VISIT (OUTPATIENT)
Dept: OBGYN CLINIC | Facility: CLINIC | Age: 26
End: 2021-09-29

## 2021-09-29 VITALS — DIASTOLIC BLOOD PRESSURE: 62 MMHG | WEIGHT: 214 LBS | BODY MASS INDEX: 34.54 KG/M2 | SYSTOLIC BLOOD PRESSURE: 112 MMHG

## 2021-09-29 DIAGNOSIS — Z30.015 ENCOUNTER FOR INITIAL PRESCRIPTION OF VAGINAL RING HORMONAL CONTRACEPTIVE: ICD-10-CM

## 2021-09-29 PROCEDURE — 99024 POSTOP FOLLOW-UP VISIT: CPT | Performed by: OBSTETRICS & GYNECOLOGY

## 2021-09-29 RX ORDER — ETONOGESTREL AND ETHINYL ESTRADIOL 11.7; 2.7 MG/1; MG/1
INSERT, EXTENDED RELEASE VAGINAL
Qty: 3 EACH | Refills: 4 | Status: SHIPPED | OUTPATIENT
Start: 2021-09-29 | End: 2022-07-09

## 2021-09-30 NOTE — PROGRESS NOTES
OB POSTPARTUM VISIT PROGRESS NOTE  Date of Encounter: 2021    Maria Victoria Ocasio    : 1995  (27 y o )  MR: [id number]    Subjective   Simone Carmona is in for her postpartum visit  She is now   She delivered by normal spontaneous vaginal delivery  She's generally doing well, denies current pain or bleeding issues, and has no significant depression issues  She is bottle feeding  We discussed all appropriate contraceptive options and she chooses Nuvaring  Objective   EXAM:    VITALS: Blood pressure 112/62, weight 97 1 kg (214 lb), last menstrual period 2020, not currently breastfeeding  BMI: Body mass index is 34 54 kg/m²  Physical Exam  Constitutional:       Appearance: Normal appearance  HENT:      Head: Normocephalic  Cardiovascular:      Rate and Rhythm: Normal rate and regular rhythm  Pulmonary:      Effort: Pulmonary effort is normal    Musculoskeletal:         General: No swelling  Neurological:      General: No focal deficit present  Mental Status: She is alert and oriented to person, place, and time  Skin:     General: Skin is warm and dry  Psychiatric:         Mood and Affect: Mood normal          Behavior: Behavior normal    Vitals reviewed  Assessment/Plan   Diagnoses and all orders for this visit:    Encounter for postpartum visit    Encounter for initial prescription of vaginal ring hormonal contraceptive  -     etonogestrel-ethinyl estradiol (NuvaRing) 0 12-0 015 MG/24HR vaginal ring; Insert vaginally and leave in place for 3 consecutive weeks, then remove for 1 week      RTO 3 months for annual    Reza Melissa MD

## 2021-10-09 ENCOUNTER — NURSE TRIAGE (OUTPATIENT)
Dept: OTHER | Facility: OTHER | Age: 26
End: 2021-10-09

## 2022-07-09 ENCOUNTER — OFFICE VISIT (OUTPATIENT)
Dept: FAMILY MEDICINE CLINIC | Facility: CLINIC | Age: 27
End: 2022-07-09
Payer: COMMERCIAL

## 2022-07-09 VITALS
BODY MASS INDEX: 38.73 KG/M2 | WEIGHT: 241 LBS | HEIGHT: 66 IN | HEART RATE: 98 BPM | SYSTOLIC BLOOD PRESSURE: 114 MMHG | DIASTOLIC BLOOD PRESSURE: 70 MMHG

## 2022-07-09 DIAGNOSIS — J02.9 SORE THROAT: ICD-10-CM

## 2022-07-09 DIAGNOSIS — O99.013 ANEMIA DURING PREGNANCY IN THIRD TRIMESTER: Primary | ICD-10-CM

## 2022-07-09 DIAGNOSIS — K29.00 ACUTE GASTRITIS, PRESENCE OF BLEEDING UNSPECIFIED, UNSPECIFIED GASTRITIS TYPE: ICD-10-CM

## 2022-07-09 DIAGNOSIS — K62.5 BRIGHT RED BLOOD PER RECTUM: ICD-10-CM

## 2022-07-09 DIAGNOSIS — R07.89 OTHER CHEST PAIN: ICD-10-CM

## 2022-07-09 PROBLEM — M25.312 SHOULDER JOINT INSTABILITY, LEFT: Status: RESOLVED | Noted: 2018-11-15 | Resolved: 2022-07-09

## 2022-07-09 PROBLEM — O99.210 OBESITY AFFECTING PREGNANCY, ANTEPARTUM: Status: RESOLVED | Noted: 2021-04-21 | Resolved: 2022-07-09

## 2022-07-09 PROBLEM — M25.519 SHOULDER PAIN: Status: RESOLVED | Noted: 2018-02-27 | Resolved: 2022-07-09

## 2022-07-09 PROBLEM — R29.898 SHOULDER WEAKNESS: Status: RESOLVED | Noted: 2018-11-15 | Resolved: 2022-07-09

## 2022-07-09 PROBLEM — F17.200 TOBACCO DEPENDENCE SYNDROME: Status: RESOLVED | Noted: 2018-02-27 | Resolved: 2022-07-09

## 2022-07-09 PROBLEM — K29.70 GASTRITIS: Status: ACTIVE | Noted: 2022-07-09

## 2022-07-09 PROBLEM — R07.1 CHEST PAIN ON BREATHING: Status: ACTIVE | Noted: 2022-07-09

## 2022-07-09 PROCEDURE — 99214 OFFICE O/P EST MOD 30 MIN: CPT | Performed by: PHYSICIAN ASSISTANT

## 2022-07-09 RX ORDER — SUCRALFATE ORAL 1 G/10ML
1 SUSPENSION ORAL 3 TIMES DAILY
Qty: 414 ML | Refills: 1 | Status: SHIPPED | OUTPATIENT
Start: 2022-07-09 | End: 2022-07-11

## 2022-07-09 NOTE — ASSESSMENT & PLAN NOTE
ER workup negative including EKG chest x-ray blood work including enzymes cardiac  Most likely than not symptoms are related to gastritis treat accordingly as above

## 2022-07-09 NOTE — PROGRESS NOTES
Assessment and Plan:    Problem List Items Addressed This Visit        Digestive    Bright red blood per rectum     Declines exam today  Most likely has internal hemorrhoids  Needs refer to colorectal if continues and she does not wan us to do exam  Info for Jeri Leak given  Relevant Orders    Iron Panel (Includes Ferritin, Iron Sat%, Iron, and TIBC)    CBC and differential    Ambulatory Referral to Colorectal Surgery    Gastritis     Most likely with causing main symptoms today  Will have patient restart her Prilosec that she has at home twice daily  GERD information she and avoidance lifestyle change sheet given  Check 1 week  I also have given her magic mouthwash to use swish and gargle for sore throat and liquid sucralfate to use 1 dose 3 times a day  Relevant Medications    al mag oxide-diphenhydramine-lidocaine viscous (MAGIC MOUTHWASH) 1:1:1 suspension    sucralfate (CARAFATE) 1 g/10 mL suspension       Other    Anemia during pregnancy in third trimester - Primary     Hemoglobin in the hospital was 10  She did give birth August of last year and her hemoglobin was as low as 6 7 at discharge 9  Check repeat CBC and iron early next week to determine if patient needs iron daily  Relevant Orders    Iron Panel (Includes Ferritin, Iron Sat%, Iron, and TIBC)    CBC and differential    Other chest pain     ER workup negative including EKG chest x-ray blood work including enzymes cardiac  Most likely than not symptoms are related to gastritis treat accordingly as above  Sore throat     Magic mouthwash as needed                          Diagnoses and all orders for this visit:    Anemia during pregnancy in third trimester  -     Iron Panel (Includes Ferritin, Iron Sat%, Iron, and TIBC)  -     CBC and differential    Other chest pain    Bright red blood per rectum  -     Iron Panel (Includes Ferritin, Iron Sat%, Iron, and TIBC)  -     CBC and differential  -     Ambulatory Referral to Colorectal Surgery; Future    Acute gastritis, presence of bleeding unspecified, unspecified gastritis type  -     al mag oxide-diphenhydramine-lidocaine viscous (MAGIC MOUTHWASH) 1:1:1 suspension; Swish and spit 10 mL every 4 (four) hours as needed for mouth pain or discomfort  -     sucralfate (CARAFATE) 1 g/10 mL suspension; Take 10 mL (1 g total) by mouth 3 (three) times a day    Sore throat            Subjective:      Patient ID: Christy Pierce is a 24 y o  female  CC:    Chief Complaint   Patient presents with    Follow-up     PT is present today for ED follow up 7/7/22 for chest pain and sore throat       HPI:    Patient here today for follow-up to [facility] Emergency Room visit on 07/07  Patient states on Wednesday she started having burning in her chest in the front with some difficulty swallowing and then it started going into her back and then she started having pain with breathing she also mentioned to the emergency room that she was having some blood when she had a bowel movement  That started Thursday without bowel movement pain  She does have a 6month-old at home  Workup in the emergency room included chest x-ray EKG blood work including cardiac enzymes  Her CBC showed a hemoglobin of 10  Patient did have anemia during pregnancy has not been told she needs to be on iron her hemoglobin at discharge a year ago from delivery was 9 but did get as low as 6 7  Patient does not know that she has any hemorrhoids she states that she does or has been getting some blood with a bowel movement upon the tissue but now she is getting a little bit of blood on the tissue even with having urination with a wipe  Patient states that she was on Prilosec during pregnancy and shortly after pregnancy however she has not felt any heartburn type issues so she has been off this for quite some time  She is also off of her anxiety depression medications    She declined rectal exam in the emergency room and is doing so today  The following portions of the patient's history were reviewed and updated as appropriate: allergies, current medications, past family history, past medical history, past social history, past surgical history and problem list       Review of Systems   Constitutional: Negative  HENT: Positive for sore throat  Eyes: Negative  Respiratory: Positive for chest tightness  Cardiovascular: Negative  Gastrointestinal: Positive for anal bleeding  Endocrine: Negative  Genitourinary: Negative  Musculoskeletal: Negative  Skin: Negative  Allergic/Immunologic: Negative  Neurological: Negative  Hematological: Negative  Psychiatric/Behavioral: Negative  Data to review:       Objective:    Vitals:    07/09/22 0942   BP: 114/70   BP Location: Right arm   Patient Position: Sitting   Cuff Size: Standard   Pulse: 98   Weight: 109 kg (241 lb)   Height: 5' 6" (1 676 m)        Physical Exam  Vitals and nursing note reviewed  Constitutional:       Appearance: Normal appearance  She is well-developed  HENT:      Head: Normocephalic and atraumatic  Eyes:      General: Lids are normal       Conjunctiva/sclera: Conjunctivae normal       Pupils: Pupils are equal, round, and reactive to light  Cardiovascular:      Rate and Rhythm: Normal rate and regular rhythm  Heart sounds: No murmur heard  Pulmonary:      Effort: Pulmonary effort is normal       Breath sounds: Normal breath sounds  Genitourinary:     Comments: Rectal exam deferred today  Skin:     General: Skin is warm and dry  Neurological:      General: No focal deficit present  Mental Status: She is alert  Coordination: Coordination is intact  Psychiatric:         Mood and Affect: Mood normal          Behavior: Behavior normal  Behavior is cooperative  Thought Content: Thought content normal          Judgment: Judgment normal            BMI Counseling:  Body mass index is 38 9 kg/m²  The BMI is above normal  Nutrition recommendations include decreasing portion sizes, encouraging healthy choices of fruits and vegetables, decreasing fast food intake, consuming healthier snacks, limiting drinks that contain sugar, moderation in carbohydrate intake, increasing intake of lean protein, reducing intake of saturated and trans fat and reducing intake of cholesterol  Exercise recommendations include exercising 3-5 times per week  No pharmacotherapy was ordered  Rationale for BMI follow-up plan is due to patient being overweight or obese

## 2022-07-09 NOTE — ASSESSMENT & PLAN NOTE
Declines exam today  Most likely has internal hemorrhoids  Needs refer to colorectal if continues and she does not wan us to do exam  Info for Micah Pitch given

## 2022-07-09 NOTE — ASSESSMENT & PLAN NOTE
Most likely with causing main symptoms today  Will have patient restart her Prilosec that she has at home twice daily  GERD information she and avoidance lifestyle change sheet given  Check 1 week  I also have given her magic mouthwash to use swish and gargle for sore throat and liquid sucralfate to use 1 dose 3 times a day

## 2022-07-09 NOTE — ASSESSMENT & PLAN NOTE
Hemoglobin in the hospital was 10  She did give birth August of last year and her hemoglobin was as low as 6 7 at discharge 9  Check repeat CBC and iron early next week to determine if patient needs iron daily

## 2022-07-09 NOTE — PATIENT INSTRUCTIONS
Problem List Items Addressed This Visit          Digestive    Bright red blood per rectum    Relevant Orders    Iron Panel (Includes Ferritin, Iron Sat%, Iron, and TIBC)    CBC and differential    Gastritis     Most likely with causing main symptoms today  Will have patient restart her Prilosec that she has at home twice daily  GERD information she and avoidance lifestyle change sheet given  Check 1 week  I also have given her magic mouthwash to use swish and gargle for sore throat and liquid sucralfate to use 1 dose 3 times a day  Relevant Medications    al mag oxide-diphenhydramine-lidocaine viscous (MAGIC MOUTHWASH) 1:1:1 suspension    sucralfate (CARAFATE) 1 g/10 mL suspension       Other    Anemia during pregnancy in third trimester - Primary     Hemoglobin in the hospital was 10  She did give birth August of last year and her hemoglobin was as low as 6 7 at discharge 9  Check repeat CBC and iron early next week to determine if patient needs iron daily  Relevant Orders    Iron Panel (Includes Ferritin, Iron Sat%, Iron, and TIBC)    CBC and differential    Other chest pain     ER workup negative including EKG chest x-ray blood work including enzymes cardiac  Most likely than not symptoms are related to gastritis treat accordingly as above  Sore throat     Magic mouthwash as needed  Diet for Stomach Ulcers and Gastritis   WHAT YOU NEED TO KNOW:   What is a diet for stomach ulcers and gastritis? A diet for ulcers and gastritis is a meal plan that limits foods that irritate your stomach  Certain foods may worsen symptoms such as stomach pain, bloating, heartburn, or indigestion  Which foods should I limit or avoid? You may need to avoid acidic, spicy, or high-fat foods  Not all foods affect everyone the same way  You will need to learn which foods worsen your symptoms and limit those foods   The following are some foods that may worsen ulcer or gastritis symptoms:  Beverages:      Whole milk and chocolate milk    Hot cocoa and cola    Any beverage with caffeine    Regular and decaffeinated coffee    Peppermint and spearmint tea    Green and black tea, with or without caffeine    Orange and grapefruit juices    Drinks that contain alcohol    Spices and seasonings:      Black and red pepper    Chili powder    Mustard seed and nutmeg    Other foods:      Dairy foods made from whole milk or cream    Chocolate    Spicy or strongly flavored cheeses, such as jalapeno or black pepper    Highly seasoned, high-fat meats, such as sausage, salami, post, ham, and cold cuts    Hot chiles and peppers    Tomato products, such as tomato paste, tomato sauce, or tomato juice    Which foods can I eat and drink? Eat a variety of healthy foods from all the food groups  Eat fruits, vegetables, whole grains, and fat-free or low-fat dairy foods  Whole grains include whole-wheat breads, cereals, pasta, and brown rice  Choose lean meats, poultry (chicken and turkey), fish, beans, eggs, and nuts  A healthy meal plan is low in unhealthy fats, salt, and added sugar  Healthy fats include olive oil and canola oil  Ask your dietitian for more information about a healthy meal plan  What other guidelines may be helpful? Do not eat right before bedtime  Stop eating at least 2 hours before bedtime  Eat small, frequent meals  Your stomach may tolerate small, frequent meals better than large meals  CARE AGREEMENT:   You have the right to help plan your care  Discuss treatment options with your healthcare provider to decide what care you want to receive  You always have the right to refuse treatment  The above information is an  only  It is not intended as medical advice for individual conditions or treatments  Talk to your doctor, nurse or pharmacist before following any medical regimen to see if it is safe and effective for you    © Copyright [employer] 2022 Information is for End User's use only and may not be sold, redistributed or otherwise used for commercial purposes   All illustrations and images included in CareNotes® are the copyrighted property of A D A M , Inc  or [facility] Agueda Villarreal

## 2022-08-07 ENCOUNTER — HOSPITAL ENCOUNTER (EMERGENCY)
Facility: HOSPITAL | Age: 27
Discharge: HOME | End: 2022-08-07
Attending: EMERGENCY MEDICINE
Payer: COMMERCIAL

## 2022-08-07 VITALS
RESPIRATION RATE: 20 BRPM | OXYGEN SATURATION: 96 % | DIASTOLIC BLOOD PRESSURE: 71 MMHG | SYSTOLIC BLOOD PRESSURE: 122 MMHG | BODY MASS INDEX: 38.82 KG/M2 | TEMPERATURE: 99.8 F | WEIGHT: 240.52 LBS | HEART RATE: 105 BPM

## 2022-08-07 DIAGNOSIS — B34.9 VIRAL ILLNESS: Primary | ICD-10-CM

## 2022-08-07 LAB
S PYO DNA THROAT QL NAA+PROBE: NOT DETECTED
SARS-COV-2 RNA RESP QL NAA+PROBE: NEGATIVE

## 2022-08-07 PROCEDURE — U0003 INFECTIOUS AGENT DETECTION BY NUCLEIC ACID (DNA OR RNA); SEVERE ACUTE RESPIRATORY SYNDROME CORONAVIRUS 2 (SARS-COV-2) (CORONAVIRUS DISEASE [COVID-19]), AMPLIFIED PROBE TECHNIQUE, MAKING USE OF HIGH THROUGHPUT TECHNOLOGIES AS DESCRIBED BY CMS-2020-01-R: HCPCS | Performed by: EMERGENCY MEDICINE

## 2022-08-07 PROCEDURE — 87651 STREP A DNA AMP PROBE: CPT | Performed by: EMERGENCY MEDICINE

## 2022-08-07 PROCEDURE — 99284 EMERGENCY DEPT VISIT MOD MDM: CPT | Performed by: EMERGENCY MEDICINE

## 2022-08-07 PROCEDURE — 99283 EMERGENCY DEPT VISIT LOW MDM: CPT

## 2022-08-07 PROCEDURE — U0005 INFEC AGEN DETEC AMPLI PROBE: HCPCS | Performed by: EMERGENCY MEDICINE

## 2022-08-07 RX ORDER — IBUPROFEN 400 MG/1
400 TABLET ORAL ONCE
Status: COMPLETED | OUTPATIENT
Start: 2022-08-07 | End: 2022-08-07

## 2022-08-07 RX ORDER — BUTALBITAL, ACETAMINOPHEN AND CAFFEINE 50; 325; 40 MG/1; MG/1; MG/1
1 TABLET ORAL ONCE
Status: COMPLETED | OUTPATIENT
Start: 2022-08-07 | End: 2022-08-07

## 2022-08-07 RX ORDER — BUTALBITAL, ACETAMINOPHEN AND CAFFEINE 50; 325; 40 MG/1; MG/1; MG/1
1 TABLET ORAL EVERY 4 HOURS PRN
Qty: 10 TABLET | Refills: 0 | Status: SHIPPED | OUTPATIENT
Start: 2022-08-07 | End: 2022-08-17

## 2022-08-07 RX ORDER — IBUPROFEN 400 MG/1
400 TABLET ORAL EVERY 6 HOURS PRN
Qty: 20 TABLET | Refills: 0 | Status: SHIPPED | OUTPATIENT
Start: 2022-08-07

## 2022-08-07 RX ADMIN — IBUPROFEN 400 MG: 400 TABLET, FILM COATED ORAL at 16:46

## 2022-08-07 RX ADMIN — BUTALBITAL, ACETAMINOPHEN AND CAFFEINE 1 TABLET: 50; 325; 40 TABLET ORAL at 18:08

## 2022-08-07 NOTE — ED PROVIDER NOTES
History  Chief Complaint   Patient presents with    Fever - 9 weeks to 74 years     Patient reports fever and consistent headache that started yesterday along with sore throat  Highest 103 7, taking dayquil but no tylenol separate or motrin  82-year-old female with no past medical history presents to the ED with a 1 day history of headache and fever up to 102 along with sore throat  No URI symptoms  No neck stiffness, nausea, vomiting or diarrhea  No known ill contacts, recent travel  History provided by:  Patient   used: No    Fever - 9 weeks to 74 years  Max temp prior to arrival:  102  Temp source:  Oral  Onset quality:  Gradual  Duration:  1 day  Timing:  Intermittent  Progression:  Unchanged  Chronicity:  New  Ineffective treatments:  Acetaminophen  Associated symptoms: headaches, myalgias and sore throat    Associated symptoms: no chest pain, no chills, no confusion, no congestion, no cough, no diarrhea, no dysuria, no ear pain, no nausea, no rash, no rhinorrhea, no somnolence and no vomiting    Headaches:     Severity:  Moderate    Onset quality:  Gradual    Duration:  1 day    Timing:  Constant    Progression:  Unchanged    Chronicity:  New  Sore throat:     Severity:  Moderate    Onset quality:  Gradual    Duration:  1 day    Timing:  Constant    Progression:  Unchanged  Risk factors: no immunosuppression, no recent sickness, no recent travel and no sick contacts        Prior to Admission Medications   Prescriptions Last Dose Informant Patient Reported? Taking?    al mag oxide-diphenhydramine-lidocaine viscous (MAGIC MOUTHWASH) 1:1:1 suspension   No No   Sig: Swish and spit 10 mL every 4 (four) hours as needed for mouth pain or discomfort   omeprazole (PriLOSEC) 20 mg delayed release capsule  Self Yes No   Sig: Take 20 mg by mouth as needed PRN    Patient not taking: No sig reported   sucralfate (CARAFATE) 1 g tablet   No No   Sig: Take 1 tablet (1 g total) by mouth 4 (four) times a day      Facility-Administered Medications: None       Past Medical History:   Diagnosis Date    Chronic back pain     Colitis     Multiple abrasions     Proteinuria     Urinary tract infection     pylonephritis       Past Surgical History:   Procedure Laterality Date    DILATION AND CURETTAGE OF UTERUS N/A 2021    Procedure: DILATATION AND CURETTAGE (D&C); Surgeon: Judy Infante MD;  [facility];  Service: Obstetrics    EXAMINATION UNDER ANESTHESIA N/A 2021    Procedure: EXAM UNDER ANESTHESIA (EUA) WITH INSERTION OF Jurline Saroj;  Surgeon: Judy Infante MD;  [facility];  Service: Obstetrics    TONSILLECTOMY  2009    WISDOM TOOTH EXTRACTION         Family History   Problem Relation Age of Onset    Hypertension Mother    [employer] Arthritis Mother     Depression Mother     COPD Mother     Gestational diabetes Mother     Lung cancer Paternal Grandmother     Cancer Paternal Grandmother     COPD Father     Depression Sister     Anxiety disorder Sister     Depression Brother     Bipolar disorder Brother     Depression Brother     No Known Problems Maternal Grandmother     COPD Maternal Grandfather     Cancer Maternal Grandfather     Heart disease Paternal Grandfather     Depression Brother      I have reviewed and agree with the history as documented      E-Cigarette/Vaping    E-Cigarette Use Never User      E-Cigarette/Vaping Substances    Nicotine No     THC No     CBD No     Flavoring No      Social History     Tobacco Use    Smoking status: Former Smoker     Quit date: 2020     Years since quittin 6    Smokeless tobacco: Never Used    Tobacco comment: use Vape   Vaping Use    Vaping Use: Never used   Substance Use Topics    Alcohol use: Not Currently    Drug use: Not Currently     Types: Marijuana     Comment: medical marijuana card; used before pregnancy       Review of Systems   Constitutional: Positive for activity change and fever  Negative for chills, diaphoresis and fatigue  HENT: Positive for sore throat  Negative for congestion, drooling, ear pain, rhinorrhea and trouble swallowing  Eyes: Negative  Negative for discharge, redness and itching  Respiratory: Negative  Negative for apnea, cough, chest tightness, shortness of breath and wheezing  Cardiovascular: Negative for chest pain, palpitations and leg swelling  Gastrointestinal: Negative  Negative for abdominal pain, diarrhea, nausea and vomiting  Endocrine: Negative  Genitourinary: Negative  Negative for dysuria, flank pain, frequency and urgency  Musculoskeletal: Positive for myalgias  Negative for back pain  Skin: Negative  Negative for rash  Allergic/Immunologic: Negative  Neurological: Positive for headaches  Negative for dizziness, tremors, seizures, syncope, facial asymmetry, speech difficulty, weakness, light-headedness and numbness  Hematological: Negative  Psychiatric/Behavioral: Negative for confusion  All other systems reviewed and are negative  Physical Exam  Physical Exam  Vitals and nursing note reviewed  Constitutional:       General: She is awake  She is not in acute distress  Appearance: Normal appearance  She is well-developed and overweight  She is not ill-appearing, toxic-appearing or diaphoretic  HENT:      Head: Normocephalic and atraumatic  Right Ear: External ear normal       Left Ear: External ear normal       Nose: Nose normal       Mouth/Throat:      Pharynx: Posterior oropharyngeal erythema present  No oropharyngeal exudate  Eyes:      General: No scleral icterus  Right eye: No discharge  Left eye: No discharge  Conjunctiva/sclera: Conjunctivae normal       Pupils: Pupils are equal, round, and reactive to light  Neck:      Thyroid: No thyromegaly  Vascular: No JVD  Trachea: No tracheal deviation  Cardiovascular:      Rate and Rhythm: Regular rhythm   Tachycardia present  Heart sounds: Normal heart sounds  No murmur heard  No friction rub  No gallop  Pulmonary:      Effort: Pulmonary effort is normal  No respiratory distress  Breath sounds: Normal breath sounds  No stridor  No wheezing, rhonchi or rales  Chest:      Chest wall: No tenderness  Abdominal:      General: Bowel sounds are normal  There is no distension  Palpations: Abdomen is soft  There is no mass  Tenderness: There is no abdominal tenderness  Hernia: No hernia is present  Musculoskeletal:      Cervical back: Full passive range of motion without pain, normal range of motion and neck supple  No rigidity  Lymphadenopathy:      Cervical: No cervical adenopathy  Skin:     General: Skin is warm and dry  Coloration: Skin is not jaundiced or pale  Findings: No bruising, erythema, lesion or rash  Neurological:      General: No focal deficit present  Mental Status: She is alert and oriented to person, place, and time  Motor: No weakness or abnormal muscle tone  Deep Tendon Reflexes: Reflexes are normal and symmetric  Psychiatric:         Mood and Affect: Mood normal          Behavior: Behavior is cooperative  Vital Signs  ED Triage Vitals [08/07/22 1604]   Temperature Pulse Respirations Blood Pressure SpO2   (!) 102 2 °F (39 °C) (!) 126 20 123/78 98 %      Temp Source Heart Rate Source Patient Position - Orthostatic VS BP Location FiO2 (%)   Oral Monitor Sitting Left arm --      Pain Score       8           Vitals:    08/07/22 1604   BP: 123/78   Pulse: (!) 126   Patient Position - Orthostatic VS: Sitting         Visual Acuity      ED Medications  Medications   ibuprofen (MOTRIN) tablet 400 mg (400 mg Oral Given 8/7/22 1646)       Diagnostic Studies  Results Reviewed     Procedure Component Value Units Date/Time    Strep A PCR [[id number]] Collected: 08/07/22 1642    Lab Status:  In process Specimen: Throat Updated: 08/07/22 1607 S [address]argavi Avmarie, only [[id number]] Collected: 08/07/22 1642    Lab Status: In process Specimen: Nares from Nose Updated: 08/07/22 1647                 No orders to display              Procedures  Procedures         ED Course                                             MDM  Number of Diagnoses or Management Options  Diagnosis management comments: 72-year-old female presents the ED with a 1 day history of fever up to 102, sore throat, headache and body aches  On exam she is alert in no acute distress  No meningeal signs on exam   Pharynx is slightly erythematous without exudates  Lungs are clear  Suspect viral illness but will order rapid strep and also COVID       Amount and/or Complexity of Data Reviewed  Clinical lab tests: ordered and reviewed        Disposition  Final diagnoses:   None     ED Disposition     None      Follow-up Information    None         Patient's Medications   Discharge Prescriptions    No medications on file       No discharge procedures on file      PDMP Review     None          ED Provider  Electronically Signed by           Mike Allen DO  08/07/22 3948

## 2022-08-10 ENCOUNTER — OFFICE VISIT (OUTPATIENT)
Dept: FAMILY MEDICINE CLINIC | Facility: CLINIC | Age: 27
End: 2022-08-10
Payer: COMMERCIAL

## 2022-08-10 VITALS
HEART RATE: 92 BPM | HEIGHT: 66 IN | WEIGHT: 241 LBS | BODY MASS INDEX: 38.73 KG/M2 | TEMPERATURE: 97.3 F | OXYGEN SATURATION: 98 %

## 2022-08-10 DIAGNOSIS — R21 FACIAL RASH: ICD-10-CM

## 2022-08-10 DIAGNOSIS — G44.201 ACUTE INTRACTABLE TENSION-TYPE HEADACHE: ICD-10-CM

## 2022-08-10 DIAGNOSIS — J02.9 SORE THROAT: Primary | ICD-10-CM

## 2022-08-10 DIAGNOSIS — R50.9 FEVER, UNSPECIFIED FEVER CAUSE: ICD-10-CM

## 2022-08-10 PROBLEM — G44.209 TENSION TYPE HEADACHE: Status: ACTIVE | Noted: 2022-08-10

## 2022-08-10 PROCEDURE — 99213 OFFICE O/P EST LOW 20 MIN: CPT | Performed by: PHYSICIAN ASSISTANT

## 2022-08-10 NOTE — PATIENT INSTRUCTIONS
Problem List Items Addressed This Visit          Other    Sore throat - Primary    Relevant Orders    Mononucleosis screen    EBV acute panel    Comprehensive metabolic panel    CBC and differential    Tension type headache    Relevant Orders    Lyme Total Antibody Profile with reflex to WB    Mononucleosis screen    EBV acute panel    Comprehensive metabolic panel    CBC and differential    Antinuclear Antibodies (DEE), IFA    Lupus anticoagulant    Fever    Relevant Orders    Lyme Total Antibody Profile with reflex to WB    Mononucleosis screen    EBV acute panel    Comprehensive metabolic panel    CBC and differential    Antinuclear Antibodies (DEE), IFA    Lupus anticoagulant          Other Visit Diagnoses       Facial rash        Relevant Orders    Lupus anticoagulant

## 2022-08-10 NOTE — PROGRESS NOTES
Assessment and Plan:  Constellation of symptoms  Pt  already covid and strep negative  Declines antibiotic  Will check labs to rule out mono, lyme or other  Also could be a virus which will go away on its own and if this happens and all testing is negative no further workup is needed  Patient would benefit from numbing loss inches or sprays such as Cepacol Sucrets Chloraseptic  She is afraid of stomach irritation as she is not eating much because of severe sore throat with taking ibuprofen with her headache so I have encouraged her to take Tylenol instead  Problem List Items Addressed This Visit        Other    Sore throat - Primary    Relevant Orders    Mononucleosis screen    EBV acute panel    Comprehensive metabolic panel    CBC and differential    Tension type headache    Relevant Orders    Lyme Total Antibody Profile with reflex to WB    Mononucleosis screen    EBV acute panel    Comprehensive metabolic panel    CBC and differential    Antinuclear Antibodies (DEE), IFA    Lupus anticoagulant    Fever    Relevant Orders    Lyme Total Antibody Profile with reflex to WB    Mononucleosis screen    EBV acute panel    Comprehensive metabolic panel    CBC and differential    Antinuclear Antibodies (DEE), IFA    Lupus anticoagulant      Other Visit Diagnoses     Facial rash        Relevant Orders    Lupus anticoagulant                 Diagnoses and all orders for this visit:    Sore throat  -     Mononucleosis screen  -     EBV acute panel  -     Comprehensive metabolic panel  -     CBC and differential    Acute intractable tension-type headache  -     Lyme Total Antibody Profile with reflex to WB  -     Mononucleosis screen  -     EBV acute panel  -     Comprehensive metabolic panel  -     CBC and differential  -     Antinuclear Antibodies (DEE), IFA; Future  -     Lupus anticoagulant;  Future    Fever, unspecified fever cause  -     Lyme Total Antibody Profile with reflex to WB  -     Mononucleosis screen  - EBV acute panel  -     Comprehensive metabolic panel  -     CBC and differential  -     Antinuclear Antibodies (DEE), IFA; Future  -     Lupus anticoagulant; Future    Facial rash  -     Lupus anticoagulant; Future            Subjective:      Patient ID: Lele Moffett is a 24 y o  female  CC:    Chief Complaint   Patient presents with    Fever     Patient present today for sore throat,severe headaches, dizziness, neck pain, clogged ears and fever highest of 103 7 for the past 4 days  Pt did a homes test on Saturday and was negative, had another test at ER on Sunday and was negative as well  HPI:    Patient with symptoms since this weekend she did go to the emergency room they did strep and COVID which was negative  She has been having a lot of headache and neck and upper back discomfort postnasal drip all clear dizziness fatigue fever over 100  No one else is sick she does not remember any tick bites  No new rash she has had a rash on her bilateral cheeks and nose and top of her bridge of her nose on her forehead for a while now without help from over-the-counter  Headaches are located bilateral sides of her head  The following portions of the patient's history were reviewed and updated as appropriate: allergies, current medications, past family history, past medical history, past social history, past surgical history and problem list       Review of Systems   Constitutional: Positive for fever  HENT: Positive for ear pain and sore throat  Eyes: Negative  Respiratory: Negative  Cardiovascular: Negative  Gastrointestinal: Negative  Endocrine: Negative  Genitourinary: Negative  Musculoskeletal: Positive for neck pain  Skin: Negative  Allergic/Immunologic: Negative  Neurological: Positive for dizziness and headaches  Hematological: Negative  Psychiatric/Behavioral: Negative            Data to review:       Objective:    Vitals:    08/10/22 1509   Pulse: 92 Temp: (!) 97 3 °F (36 3 °C)   TempSrc: Tympanic   SpO2: 98%   Weight: 109 kg (241 lb)   Height: 5' 6" (1 676 m)        Physical Exam  Vitals and nursing note reviewed  Constitutional:       Appearance: Normal appearance  She is well-developed  HENT:      Head: Normocephalic and atraumatic  Right Ear: Hearing, tympanic membrane, ear canal and external ear normal       Left Ear: Hearing, tympanic membrane, ear canal and external ear normal       Nose: Nose normal       Mouth/Throat:      Pharynx: Pharyngeal swelling and posterior oropharyngeal erythema present  Tonsils: No tonsillar exudate  Eyes:      General: Lids are normal       Conjunctiva/sclera: Conjunctivae normal       Pupils: Pupils are equal, round, and reactive to light  Cardiovascular:      Rate and Rhythm: Normal rate and regular rhythm  Heart sounds: No murmur heard  Pulmonary:      Effort: Pulmonary effort is normal       Breath sounds: Normal breath sounds  Lymphadenopathy:      Cervical: Cervical adenopathy present  Right cervical: Superficial cervical adenopathy, deep cervical adenopathy and posterior cervical adenopathy present  Skin:     General: Skin is warm and dry  Neurological:      General: No focal deficit present  Mental Status: She is alert  Coordination: Coordination is intact  Psychiatric:         Mood and Affect: Mood normal          Behavior: Behavior normal  Behavior is cooperative  Thought Content:  Thought content normal          Judgment: Judgment normal

## 2022-08-13 ENCOUNTER — LAB (OUTPATIENT)
Dept: LAB | Facility: HOSPITAL | Age: 27
End: 2022-08-13
Payer: COMMERCIAL

## 2022-08-13 DIAGNOSIS — R21 FACIAL RASH: ICD-10-CM

## 2022-08-13 DIAGNOSIS — G44.201 ACUTE INTRACTABLE TENSION-TYPE HEADACHE: ICD-10-CM

## 2022-08-13 DIAGNOSIS — R50.9 FEVER, UNSPECIFIED FEVER CAUSE: ICD-10-CM

## 2022-08-13 LAB
ALBUMIN SERPL BCP-MCNC: 3.4 G/DL (ref 3.5–5)
ALP SERPL-CCNC: 114 U/L (ref 46–116)
ALT SERPL W P-5'-P-CCNC: 54 U/L (ref 12–78)
ANION GAP SERPL CALCULATED.3IONS-SCNC: 11 MMOL/L (ref 4–13)
AST SERPL W P-5'-P-CCNC: 26 U/L (ref 5–45)
BASOPHILS # BLD AUTO: 0.07 THOUSANDS/ΜL (ref 0–0.1)
BASOPHILS NFR BLD AUTO: 1 % (ref 0–1)
BILIRUB SERPL-MCNC: 0.14 MG/DL (ref 0.2–1)
BUN SERPL-MCNC: 9 MG/DL (ref 5–25)
CALCIUM ALBUM COR SERPL-MCNC: 9.7 MG/DL (ref 8.3–10.1)
CALCIUM SERPL-MCNC: 9.2 MG/DL (ref 8.3–10.1)
CHLORIDE SERPL-SCNC: 104 MMOL/L (ref 96–108)
CO2 SERPL-SCNC: 26 MMOL/L (ref 21–32)
CREAT SERPL-MCNC: 0.79 MG/DL (ref 0.6–1.3)
EOSINOPHIL # BLD AUTO: 0.09 THOUSAND/ΜL (ref 0–0.61)
EOSINOPHIL NFR BLD AUTO: 1 % (ref 0–6)
ERYTHROCYTE [DISTWIDTH] IN BLOOD BY AUTOMATED COUNT: 16.4 % (ref 11.6–15.1)
GFR SERPL CREATININE-BSD FRML MDRD: 104 ML/MIN/1.73SQ M
GLUCOSE P FAST SERPL-MCNC: 84 MG/DL (ref 65–99)
HCT VFR BLD AUTO: 40.2 % (ref 34.8–46.1)
HGB BLD-MCNC: 11.8 G/DL (ref 11.5–15.4)
IMM GRANULOCYTES # BLD AUTO: 0.04 THOUSAND/UL (ref 0–0.2)
IMM GRANULOCYTES NFR BLD AUTO: 1 % (ref 0–2)
LYMPHOCYTES # BLD AUTO: 3.01 THOUSANDS/ΜL (ref 0.6–4.47)
LYMPHOCYTES NFR BLD AUTO: 39 % (ref 14–44)
MCH RBC QN AUTO: 22.6 PG (ref 26.8–34.3)
MCHC RBC AUTO-ENTMCNC: 29.4 G/DL (ref 31.4–37.4)
MCV RBC AUTO: 77 FL (ref 82–98)
MONOCYTES # BLD AUTO: 0.44 THOUSAND/ΜL (ref 0.17–1.22)
MONOCYTES NFR BLD AUTO: 6 % (ref 4–12)
NEUTROPHILS # BLD AUTO: 4.11 THOUSANDS/ΜL (ref 1.85–7.62)
NEUTS SEG NFR BLD AUTO: 52 % (ref 43–75)
NRBC BLD AUTO-RTO: 0 /100 WBCS
PLATELET # BLD AUTO: 257 THOUSANDS/UL (ref 149–390)
PMV BLD AUTO: 12.6 FL (ref 8.9–12.7)
POTASSIUM SERPL-SCNC: 4.5 MMOL/L (ref 3.5–5.3)
PROT SERPL-MCNC: 8.1 G/DL (ref 6.4–8.4)
RBC # BLD AUTO: 5.23 MILLION/UL (ref 3.81–5.12)
SODIUM SERPL-SCNC: 141 MMOL/L (ref 135–147)
WBC # BLD AUTO: 7.76 THOUSAND/UL (ref 4.31–10.16)

## 2022-08-13 PROCEDURE — 86308 HETEROPHILE ANTIBODY SCREEN: CPT | Performed by: PHYSICIAN ASSISTANT

## 2022-08-13 PROCEDURE — 86665 EPSTEIN-BARR CAPSID VCA: CPT | Performed by: PHYSICIAN ASSISTANT

## 2022-08-13 PROCEDURE — 85613 RUSSELL VIPER VENOM DILUTED: CPT

## 2022-08-13 PROCEDURE — 80053 COMPREHEN METABOLIC PANEL: CPT | Performed by: PHYSICIAN ASSISTANT

## 2022-08-13 PROCEDURE — 86664 EPSTEIN-BARR NUCLEAR ANTIGEN: CPT | Performed by: PHYSICIAN ASSISTANT

## 2022-08-13 PROCEDURE — 85705 THROMBOPLASTIN INHIBITION: CPT

## 2022-08-13 PROCEDURE — 86038 ANTINUCLEAR ANTIBODIES: CPT

## 2022-08-13 PROCEDURE — 85732 THROMBOPLASTIN TIME PARTIAL: CPT

## 2022-08-13 PROCEDURE — 86618 LYME DISEASE ANTIBODY: CPT | Performed by: PHYSICIAN ASSISTANT

## 2022-08-13 PROCEDURE — 85670 THROMBIN TIME PLASMA: CPT

## 2022-08-13 PROCEDURE — 36415 COLL VENOUS BLD VENIPUNCTURE: CPT | Performed by: PHYSICIAN ASSISTANT

## 2022-08-13 PROCEDURE — 86663 EPSTEIN-BARR ANTIBODY: CPT | Performed by: PHYSICIAN ASSISTANT

## 2022-08-13 PROCEDURE — 85025 COMPLETE CBC W/AUTO DIFF WBC: CPT | Performed by: PHYSICIAN ASSISTANT

## 2022-08-14 LAB
B BURGDOR IGG+IGM SER-ACNC: 0.5 AI
HETEROPH AB SER QL: NEGATIVE

## 2022-08-15 LAB
ANA HOMOGEN TITR SER: NORMAL {TITER}
ANA TITR SER IF: POSITIVE {TITER}
EBV NA IGG SER IA-ACNC: 277 U/ML (ref 0–17.9)
EBV VCA IGG SER IA-ACNC: >600 U/ML (ref 0–17.9)
EBV VCA IGM SER IA-ACNC: <36 U/ML (ref 0–35.9)
INTERPRETATION: ABNORMAL
SL AMB NOTE:: NORMAL

## 2022-08-17 ENCOUNTER — TELEPHONE (OUTPATIENT)
Dept: FAMILY MEDICINE CLINIC | Facility: CLINIC | Age: 27
End: 2022-08-17

## 2022-08-17 LAB
APTT SCREEN TO CONFIRM RATIO: 1.08 RATIO (ref 0–1.34)
CONFIRM APTT/NORMAL: 38.3 SEC (ref 0–47.6)
LA PPP-IMP: NORMAL
SCREEN APTT: 42.8 SEC (ref 0–51.9)
SCREEN DRVVT: 46.5 SEC (ref 0–47)
THROMBIN TIME: 17.4 SEC (ref 0–23)

## 2022-08-17 NOTE — TELEPHONE ENCOUNTER
Pt is calling because she saw her most recent lab results on MyChart and is feeling nervous about the results  Can they please be reviewed so that pt can have the results explained to her?

## 2022-08-18 NOTE — TELEPHONE ENCOUNTER
KB, Pt aware of lab results, Pt states that she still is having drainage from the nose, Facial rash pt feels is getting worse and feeling very tired, What would be the next step?

## 2022-08-18 NOTE — TELEPHONE ENCOUNTER
All results are finally back  We were waiting on one for a while  Basically all labs are normal/negative with exception to a few:  EBV is elevated which means she has had mono in the past  Her values are very high so this may mean it was recently but there is no way to really tell  DEE is positive which is a general autoimmune marker  This is not specific  Lupus was negative which I checked due to her facial rash which is long standing  I would have her see Rheumatology in the future but non urgent  CBC is better than one year ago but still shows slight decrease in indices which may mean she is a little deficient in iron which we can check a level of but would not be causing her symptoms  Please see how she is feeling and we can decide what the next step is if she is still having any of her symptoms

## 2022-08-18 NOTE — RESULT ENCOUNTER NOTE
All results are finally back  We were waiting on one for a while  Basically all labs are normal/negative with exception to a few:  1  EBV is elevated which means she has had mono in the past  Her values are very high so this may mean it was recently but there is no way to really tell  2  DEE is positive which is a general autoimmune marker  This is not specific  Lupus was negative which I checked due to her facial rash which is long standing  I would have her see Rheumatology in the future but non urgent  3  CBC is better than one year ago but still shows slight decrease in indices which may mean she is a little deficient in iron which we can check a level of but would not be causing her symptoms  4  Please see how she is feeling and we can decide what the next step is if she is still having any of her symptoms

## 2022-08-19 DIAGNOSIS — R53.83 FATIGUE, UNSPECIFIED TYPE: ICD-10-CM

## 2022-08-19 DIAGNOSIS — R21 FACIAL RASH: ICD-10-CM

## 2022-08-19 DIAGNOSIS — R21 FACIAL RASH: Primary | ICD-10-CM

## 2022-08-19 DIAGNOSIS — R71.8 MICROCYTIC RED BLOOD CELLS: Primary | ICD-10-CM

## 2022-08-19 DIAGNOSIS — R76.8 POSITIVE ANA (ANTINUCLEAR ANTIBODY): ICD-10-CM

## 2022-08-19 NOTE — TELEPHONE ENCOUNTER
I have ordered pt a non fasting iron blood test, dermatology for the facial rash and Rheum for her positive DEE  Rheum will take a long while to get in with so she needs to be patient  Most likely what she had was a viral infection which will slowly improve

## 2022-10-11 PROBLEM — R50.9 FEVER: Status: RESOLVED | Noted: 2022-08-10 | Resolved: 2022-10-11

## 2023-08-10 ENCOUNTER — OFFICE VISIT (OUTPATIENT)
Dept: GASTROENTEROLOGY | Facility: CLINIC | Age: 28
End: 2023-08-10
Payer: COMMERCIAL

## 2023-08-10 VITALS
HEIGHT: 66 IN | DIASTOLIC BLOOD PRESSURE: 81 MMHG | SYSTOLIC BLOOD PRESSURE: 149 MMHG | HEART RATE: 88 BPM | WEIGHT: 242.8 LBS | BODY MASS INDEX: 39.02 KG/M2

## 2023-08-10 DIAGNOSIS — D50.9 IRON DEFICIENCY ANEMIA, UNSPECIFIED IRON DEFICIENCY ANEMIA TYPE: ICD-10-CM

## 2023-08-10 DIAGNOSIS — K29.00 ACUTE GASTRITIS, PRESENCE OF BLEEDING UNSPECIFIED, UNSPECIFIED GASTRITIS TYPE: Primary | ICD-10-CM

## 2023-08-10 DIAGNOSIS — K62.5 BRIGHT RED BLOOD PER RECTUM: ICD-10-CM

## 2023-08-10 DIAGNOSIS — R13.10 DYSPHAGIA, UNSPECIFIED TYPE: ICD-10-CM

## 2023-08-10 DIAGNOSIS — R19.5 LOOSE STOOLS: ICD-10-CM

## 2023-08-10 PROCEDURE — 99204 OFFICE O/P NEW MOD 45 MIN: CPT | Performed by: INTERNAL MEDICINE

## 2023-08-10 RX ORDER — KETOCONAZOLE 20 MG/ML
SHAMPOO TOPICAL AS NEEDED
COMMUNITY
Start: 2023-06-02

## 2023-08-10 NOTE — PROGRESS NOTES
Consultation - [address]  [facility]  Robert Nunes 34 y.o. female MRN: [id number]  Unit/Bed#:  Encounter: [id number]        Consults    ASSESSMENT/PLAN:       1. Loose stools/hematochezia/positive IBD panel and iron deficiency anemia-raising the possibility of undiagnosed inflammatory bowel disease given family history of ulcerative colitis in mother. Patient is currently being worked up by rheumatology as well. -Given iron deficiency anemia, hematochezia, loose stools, would recommend both EGD and colonoscopy for further evaluation of inflammatory bowel disease. If these are unremarkable, may need VCE.  -Discussed with patient regarding the importance of adequate bowel prep.  -Patient was explained about the risks and benefits of the procedure. Risks including but not limited to bleeding, infection, perforation were explained in detail. Also explained about less than 100% sensitivity with the exam and other alternatives. 2.  Chronic GERD with intermittent episodes of dysphagia-dysphagia symptoms seem to be oropharyngeal however Nataly-Indra syndrome is in differential, other possibility includes esophageal stricture or EOE. -Patient reports that symptoms are controlled with as needed omeprazole.  -Would recommend to follow antireflux measures and avoid the use of NSAIDs.  -Follow non ulcerogenic diet.      ______________________________________________________________________    Reason for Consult / Principal Problem: [unfilled]    HPI: Robert Nunes is a 34y.o. year old female with history of iron deficiency anemia has been referred for evaluation of anemia and positive IBD panel. I am unable to review the results however as per note from [facility] patient underwent testing for IBD panel which came back positive. She was subsequently referred to GI for further evaluation.   I have reviewed the results on her phone, she has noted to have positive ASCA and positive DEE. Also noted to have microcytic anemia with hemoglobin of 12 and MCV of 75. Ferritin was low, iron was low. Patient reports that she occasionally has rectal bleeding which she describes as bright red blood, also reports some blood covering the stool. She denies any black stools. She is on iron supplements at this time. She reports that her mother has what sounds like ulcerative colitis. She also reports frequent symptoms of acid reflux for which she takes over-the-counter omeprazole on as-needed basis. She reports occasional symptoms of dysphagia with feeling of food getting stuck in her oropharynx. She denies any hematemesis, coffee and emesis or melena. No unintentional weight loss. She has never had an EGD or colonoscopy. Review of Systems: The remainder of the review of systems was negative except for the pertinent positives noted in HPI. Historical Information   Past Medical History:   Diagnosis Date   • Chronic back pain    • Colitis    • Multiple abrasions 2013   • Proteinuria    • Urinary tract infection     pylonephritis     Past Surgical History:   Procedure Laterality Date   • DILATION AND CURETTAGE OF UTERUS N/A 8/23/2021    Procedure: DILATATION AND CURETTAGE (D&C);   Surgeon: Sonu Lozada MD;  [facility];  Service: Obstetrics   • EXAMINATION UNDER ANESTHESIA N/A 8/23/2021    Procedure: EXAM UNDER ANESTHESIA (EUA) WITH INSERTION OF Suhas Satchel;  Surgeon: Sonu Lozada MD;  [facility];  Service: Obstetrics   • TONSILLECTOMY  2009   • WISDOM TOOTH EXTRACTION       Social History   Social History     Substance and Sexual Activity   Alcohol Use Not Currently     Social History     Substance and Sexual Activity   Drug Use Not Currently   • Types: Marijuana    Comment: medical marijuana card; used before pregnancy     Social History     Tobacco Use   Smoking Status Former   • Types: Cigarettes   • Quit date: 12/1/2020   • Years since quittin.6   Smokeless Tobacco Never   Tobacco Comments    use Vape     Family History   Problem Relation Age of Onset   • Hypertension Mother    • Arthritis Mother    • Depression Mother    • COPD Mother    • Gestational diabetes Mother    • Lung cancer Paternal Grandmother    • Cancer Paternal Grandmother    • COPD Father    • Depression Sister    • Anxiety disorder Sister    • Depression Brother    • Bipolar disorder Brother    • Depression Brother    • No Known Problems Maternal Grandmother    • COPD Maternal Grandfather    • Cancer Maternal Grandfather    • Heart disease Paternal Grandfather    • Depression Brother        Meds/Allergies     (Not in a hospital admission)    No current facility-administered medications for this visit. No Known Allergies    Objective     not currently breastfeeding. [unfilled]    PHYSICAL EXAM     GEN: well nourished, well developed, no acute distress  HEENT: anicteric, MMM, no cervical or supraclavicular lymphadenopathy  CV: RRR, no m/r/g  CHEST: CTA b/l, no WRR  ABD: +BS, soft, NT/ND, no hepatosplenomegaly  EXT: no c/c/e  SKIN: no rashes,  NEURO: aaox3    Lab Results:   No visits with results within 1 Day(s) from this visit.    Latest known visit with results is:   Lab on 2022   Component Date Value   • Antinuclear Antibodies, * 2022 Positive (A)    • PTT Lupus Anticoagulant 2022 42.8    • Dilute Viper Venom Time 2022 46.5    • DILUTE PROTHROMBIN TIME(* 2022 38.3    • THROMBIN TIME (DRVW) 2022 17.4    • DPT CONFIRM RATIO 2022 1.08    • LUPUS REFLEX INTERPRETAT* 2022 Comment:    • Homogeneous Pattern 2022 1:80    • Note: 2022 Comment      Imaging Studies: I have personally reviewed pertinent films in PACS

## 2023-08-10 NOTE — PATIENT INSTRUCTIONS
Scheduled date of EGD/colonoscopy (as of today): 9/6/23  Physician performing EGD/colonoscopy: Dr. Massimo Stein  Location of EGD/colonoscopy: [facility]  Desired bowel prep reviewed with patient: [email or web address]  Instructions reviewed with patient by:Caryn MARTIN  Clearances: n.a
- - -

## 2023-08-10 NOTE — H&P (VIEW-ONLY)
Consultation - [address]  [facility]  Danielle Driscoll 34 y.o. female MRN: [id number]  Unit/Bed#:  Encounter: [id number]        Consults    ASSESSMENT/PLAN:       1. Loose stools/hematochezia/positive IBD panel and iron deficiency anemia-raising the possibility of undiagnosed inflammatory bowel disease given family history of ulcerative colitis in mother. Patient is currently being worked up by rheumatology as well. -Given iron deficiency anemia, hematochezia, loose stools, would recommend both EGD and colonoscopy for further evaluation of inflammatory bowel disease. If these are unremarkable, may need VCE.  -Discussed with patient regarding the importance of adequate bowel prep.  -Patient was explained about the risks and benefits of the procedure. Risks including but not limited to bleeding, infection, perforation were explained in detail. Also explained about less than 100% sensitivity with the exam and other alternatives. 2.  Chronic GERD with intermittent episodes of dysphagia-dysphagia symptoms seem to be oropharyngeal however Nataly-Indra syndrome is in differential, other possibility includes esophageal stricture or EOE. -Patient reports that symptoms are controlled with as needed omeprazole.  -Would recommend to follow antireflux measures and avoid the use of NSAIDs.  -Follow non ulcerogenic diet.      ______________________________________________________________________    Reason for Consult / Principal Problem: [unfilled]    HPI: Danielle Driscoll is a 34y.o. year old female with history of iron deficiency anemia has been referred for evaluation of anemia and positive IBD panel. I am unable to review the results however as per note from [facility] medical group patient underwent testing for IBD panel which came back positive. She was subsequently referred to GI for further evaluation.   I have reviewed the results on her phone, she has noted to have positive ASCA and positive DEE. Also noted to have microcytic anemia with hemoglobin of 12 and MCV of 75. Ferritin was low, iron was low. Patient reports that she occasionally has rectal bleeding which she describes as bright red blood, also reports some blood covering the stool. She denies any black stools. She is on iron supplements at this time. She reports that her mother has what sounds like ulcerative colitis. She also reports frequent symptoms of acid reflux for which she takes over-the-counter omeprazole on as-needed basis. She reports occasional symptoms of dysphagia with feeling of food getting stuck in her oropharynx. She denies any hematemesis, coffee and emesis or melena. No unintentional weight loss. She has never had an EGD or colonoscopy. Review of Systems: The remainder of the review of systems was negative except for the pertinent positives noted in HPI. Historical Information   Past Medical History:   Diagnosis Date   • Chronic back pain    • Colitis    • Multiple abrasions 2013   • Proteinuria    • Urinary tract infection     pylonephritis     Past Surgical History:   Procedure Laterality Date   • DILATION AND CURETTAGE OF UTERUS N/A 8/23/2021    Procedure: DILATATION AND CURETTAGE (D&C);   Surgeon: Shelton Morgan MD;  [facility];  Service: Obstetrics   • EXAMINATION UNDER ANESTHESIA N/A 8/23/2021    Procedure: EXAM UNDER ANESTHESIA (EUA) WITH INSERTION OF Enola [place];  Surgeon: Shelton Morgan MD;  [facility];  Service: Obstetrics   • TONSILLECTOMY  2009   • WISDOM TOOTH EXTRACTION       Social History   Social History     Substance and Sexual Activity   Alcohol Use Not Currently     Social History     Substance and Sexual Activity   Drug Use Not Currently   • Types: Marijuana    Comment: medical marijuana card; used before pregnancy     Social History     Tobacco Use   Smoking Status Former   • Types: Cigarettes   • Quit date: 12/1/2020   • Years since quittin.6   Smokeless Tobacco Never   Tobacco Comments    use Vape     Family History   Problem Relation Age of Onset   • Hypertension Mother    • Arthritis Mother    • Depression Mother    • COPD Mother    • Gestational diabetes Mother    • Lung cancer Paternal Grandmother    • Cancer Paternal Grandmother    • COPD Father    • Depression Sister    • Anxiety disorder Sister    • Depression Brother    • Bipolar disorder Brother    • Depression Brother    • No Known Problems Maternal Grandmother    • COPD Maternal Grandfather    • Cancer Maternal Grandfather    • Heart disease Paternal Grandfather    • Depression Brother        Meds/Allergies     (Not in a hospital admission)    No current facility-administered medications for this visit. No Known Allergies    Objective     not currently breastfeeding. [unfilled]    PHYSICAL EXAM     GEN: well nourished, well developed, no acute distress  HEENT: anicteric, MMM, no cervical or supraclavicular lymphadenopathy  CV: RRR, no m/r/g  CHEST: CTA b/l, no WRR  ABD: +BS, soft, NT/ND, no hepatosplenomegaly  EXT: no c/c/e  SKIN: no rashes,  NEURO: aaox3    Lab Results:   No visits with results within 1 Day(s) from this visit.    Latest known visit with results is:   Lab on 2022   Component Date Value   • Antinuclear Antibodies, * 2022 Positive (A)    • PTT Lupus Anticoagulant 2022 42.8    • Dilute Viper Venom Time 2022 46.5    • DILUTE PROTHROMBIN TIME(* 2022 38.3    • THROMBIN TIME (DRVW) 2022 17.4    • DPT CONFIRM RATIO 2022 1.08    • LUPUS REFLEX INTERPRETAT* 2022 Comment:    • Homogeneous Pattern 2022 1:80    • Note: 2022 Comment      Imaging Studies: I have personally reviewed pertinent films in PACS

## 2023-09-06 ENCOUNTER — HOSPITAL ENCOUNTER (OUTPATIENT)
Dept: GASTROENTEROLOGY | Facility: AMBULARY SURGERY CENTER | Age: 28
Setting detail: OUTPATIENT SURGERY
Discharge: HOME | End: 2023-09-06
Attending: INTERNAL MEDICINE
Payer: COMMERCIAL

## 2023-09-06 ENCOUNTER — ANESTHESIA EVENT (OUTPATIENT)
Dept: GASTROENTEROLOGY | Facility: AMBULARY SURGERY CENTER | Age: 28
End: 2023-09-06

## 2023-09-06 ENCOUNTER — ANESTHESIA (OUTPATIENT)
Dept: GASTROENTEROLOGY | Facility: AMBULARY SURGERY CENTER | Age: 28
End: 2023-09-06

## 2023-09-06 VITALS
WEIGHT: 242.51 LBS | HEIGHT: 66 IN | BODY MASS INDEX: 38.97 KG/M2 | SYSTOLIC BLOOD PRESSURE: 122 MMHG | RESPIRATION RATE: 22 BRPM | OXYGEN SATURATION: 100 % | DIASTOLIC BLOOD PRESSURE: 71 MMHG | TEMPERATURE: 97.3 F | HEART RATE: 80 BPM

## 2023-09-06 DIAGNOSIS — K62.5 BRIGHT RED BLOOD PER RECTUM: ICD-10-CM

## 2023-09-06 DIAGNOSIS — R13.10 DYSPHAGIA, UNSPECIFIED TYPE: ICD-10-CM

## 2023-09-06 LAB
EXT PREGNANCY TEST URINE: NEGATIVE
EXT. CONTROL: NORMAL

## 2023-09-06 PROCEDURE — 88360 TUMOR IMMUNOHISTOCHEM/MANUAL: CPT | Performed by: STUDENT IN AN ORGANIZED HEALTH CARE EDUCATION/TRAINING PROGRAM

## 2023-09-06 PROCEDURE — 88305 TISSUE EXAM BY PATHOLOGIST: CPT | Performed by: STUDENT IN AN ORGANIZED HEALTH CARE EDUCATION/TRAINING PROGRAM

## 2023-09-06 PROCEDURE — 81025 URINE PREGNANCY TEST: CPT | Performed by: INTERNAL MEDICINE

## 2023-09-06 PROCEDURE — 88341 IMHCHEM/IMCYTCHM EA ADD ANTB: CPT | Performed by: STUDENT IN AN ORGANIZED HEALTH CARE EDUCATION/TRAINING PROGRAM

## 2023-09-06 PROCEDURE — 88342 IMHCHEM/IMCYTCHM 1ST ANTB: CPT | Performed by: STUDENT IN AN ORGANIZED HEALTH CARE EDUCATION/TRAINING PROGRAM

## 2023-09-06 RX ORDER — PROPOFOL 10 MG/ML
INJECTION, EMULSION INTRAVENOUS CONTINUOUS PRN
Status: DISCONTINUED | OUTPATIENT
Start: 2023-09-06 | End: 2023-09-06

## 2023-09-06 RX ORDER — SODIUM CHLORIDE, SODIUM LACTATE, POTASSIUM CHLORIDE, CALCIUM CHLORIDE 600; 310; 30; 20 MG/100ML; MG/100ML; MG/100ML; MG/100ML
125 INJECTION, SOLUTION INTRAVENOUS CONTINUOUS
Status: DISCONTINUED | OUTPATIENT
Start: 2023-09-06 | End: 2023-09-10 | Stop reason: HOSPADM

## 2023-09-06 RX ORDER — PROPOFOL 10 MG/ML
INJECTION, EMULSION INTRAVENOUS AS NEEDED
Status: DISCONTINUED | OUTPATIENT
Start: 2023-09-06 | End: 2023-09-06

## 2023-09-06 RX ORDER — SODIUM CHLORIDE, SODIUM LACTATE, POTASSIUM CHLORIDE, CALCIUM CHLORIDE 600; 310; 30; 20 MG/100ML; MG/100ML; MG/100ML; MG/100ML
INJECTION, SOLUTION INTRAVENOUS CONTINUOUS PRN
Status: DISCONTINUED | OUTPATIENT
Start: 2023-09-06 | End: 2023-09-06

## 2023-09-06 RX ORDER — LIDOCAINE HYDROCHLORIDE 10 MG/ML
INJECTION, SOLUTION EPIDURAL; INFILTRATION; INTRACAUDAL; PERINEURAL AS NEEDED
Status: DISCONTINUED | OUTPATIENT
Start: 2023-09-06 | End: 2023-09-06

## 2023-09-06 RX ADMIN — SODIUM CHLORIDE, SODIUM LACTATE, POTASSIUM CHLORIDE, AND CALCIUM CHLORIDE 125 ML/HR: .6; .31; .03; .02 INJECTION, SOLUTION INTRAVENOUS at 10:40

## 2023-09-06 RX ADMIN — LIDOCAINE HYDROCHLORIDE 50 MG: 10 INJECTION, SOLUTION EPIDURAL; INFILTRATION; INTRACAUDAL; PERINEURAL at 11:01

## 2023-09-06 RX ADMIN — PROPOFOL 40 MG: 10 INJECTION, EMULSION INTRAVENOUS at 11:14

## 2023-09-06 RX ADMIN — SODIUM CHLORIDE, SODIUM LACTATE, POTASSIUM CHLORIDE, AND CALCIUM CHLORIDE: .6; .31; .03; .02 INJECTION, SOLUTION INTRAVENOUS at 10:57

## 2023-09-06 RX ADMIN — PROPOFOL 100 MCG/KG/MIN: 10 INJECTION, EMULSION INTRAVENOUS at 11:02

## 2023-09-06 RX ADMIN — PROPOFOL 150 MG: 10 INJECTION, EMULSION INTRAVENOUS at 11:01

## 2023-09-06 RX ADMIN — PROPOFOL 100 MG: 10 INJECTION, EMULSION INTRAVENOUS at 11:06

## 2023-09-06 NOTE — ANESTHESIA POSTPROCEDURE EVALUATION
Post-Op Assessment Note    CV Status:  Stable  Pain Score: 0    Pain management: adequate     Mental Status:  Sleepy and arousable   Hydration Status:  Stable   PONV Controlled:  None   Airway Patency:  Patent      Post Op Vitals Reviewed: Yes      Staff: CRNA   Comments: spontaneously breathing, protecting airway, vss, fully endorsed to recovery w/o AC        No notable events documented.     /58 (09/06/23 1138)    Temp     Pulse 81 (09/06/23 1138)   Resp 18 (09/06/23 1138)    SpO2 95 % (09/06/23 1138)

## 2023-09-06 NOTE — ANESTHESIA PREPROCEDURE EVALUATION
Procedure:  COLONOSCOPY  EGD    Relevant Problems   CARDIO   (+) Other chest pain      GI/HEPATIC   (+) Bright red blood per rectum      HEMATOLOGY   (+) Anemia during pregnancy in third trimester   (+) Iron deficiency anemia      MUSCULOSKELETAL   (+) Back strain   (+) Scoliosis deformity of spine      NEURO/PSYCH   (+) Anxiety and depression   (+) Tension type headache      Other   (+) Obesity             Anesthesia Plan  ASA Score- 2     Anesthesia Type- IV sedation with anesthesia with ASA Monitors. Additional Monitors:   Airway Plan:           Plan Factors-    Chart reviewed. Induction- intravenous. Postoperative Plan-     Informed Consent- Anesthetic plan and risks discussed with patient. I personally reviewed this patient with the CRNA. Discussed and agreed on the Anesthesia Plan with the CRNA. Clarisse Rome

## 2023-09-06 NOTE — INTERVAL H&P NOTE
H&P reviewed. After examining the patient I find no changes in the patients condition since the H&P had been written.     Vitals:    09/06/23 1036   BP: 133/76   Pulse: 89   Resp: 16   Temp: (!) 97.3 °F (36.3 °C)   SpO2: 97%

## 2023-09-11 PROCEDURE — 88341 IMHCHEM/IMCYTCHM EA ADD ANTB: CPT | Performed by: STUDENT IN AN ORGANIZED HEALTH CARE EDUCATION/TRAINING PROGRAM

## 2023-09-11 PROCEDURE — 88360 TUMOR IMMUNOHISTOCHEM/MANUAL: CPT | Performed by: STUDENT IN AN ORGANIZED HEALTH CARE EDUCATION/TRAINING PROGRAM

## 2023-09-11 PROCEDURE — 88342 IMHCHEM/IMCYTCHM 1ST ANTB: CPT | Performed by: STUDENT IN AN ORGANIZED HEALTH CARE EDUCATION/TRAINING PROGRAM

## 2023-09-11 PROCEDURE — 88305 TISSUE EXAM BY PATHOLOGIST: CPT | Performed by: STUDENT IN AN ORGANIZED HEALTH CARE EDUCATION/TRAINING PROGRAM

## 2023-09-13 DIAGNOSIS — K27.9 PUD (PEPTIC ULCER DISEASE): Primary | ICD-10-CM

## 2023-09-13 DIAGNOSIS — K63.3 EROSION OF TERMINAL ILEUM: ICD-10-CM

## 2023-09-13 RX ORDER — MESALAMINE 500 MG/1
1000 CAPSULE, EXTENDED RELEASE ORAL 4 TIMES DAILY
Qty: 240 CAPSULE | Refills: 1 | Status: SHIPPED | OUTPATIENT
Start: 2023-09-13 | End: 2023-11-12

## 2023-09-13 RX ORDER — PANTOPRAZOLE SODIUM 40 MG/1
40 TABLET, DELAYED RELEASE ORAL DAILY
Qty: 90 TABLET | Refills: 0 | Status: SHIPPED | OUTPATIENT
Start: 2023-09-13 | End: 2023-12-12

## 2023-10-17 ENCOUNTER — OFFICE VISIT (OUTPATIENT)
Dept: FAMILY MEDICINE CLINIC | Facility: CLINIC | Age: 28
End: 2023-10-17
Payer: COMMERCIAL

## 2023-10-17 ENCOUNTER — HOSPITAL ENCOUNTER (OUTPATIENT)
Dept: RADIOLOGY | Facility: HOSPITAL | Age: 28
Discharge: HOME | End: 2023-10-17
Payer: COMMERCIAL

## 2023-10-17 VITALS
OXYGEN SATURATION: 98 % | WEIGHT: 242 LBS | HEART RATE: 104 BPM | HEIGHT: 66 IN | BODY MASS INDEX: 38.89 KG/M2 | DIASTOLIC BLOOD PRESSURE: 80 MMHG | TEMPERATURE: 98 F | RESPIRATION RATE: 20 BRPM | SYSTOLIC BLOOD PRESSURE: 110 MMHG

## 2023-10-17 DIAGNOSIS — J20.9 ACUTE BRONCHITIS, UNSPECIFIED ORGANISM: Primary | ICD-10-CM

## 2023-10-17 DIAGNOSIS — H10.31 ACUTE CONJUNCTIVITIS OF RIGHT EYE, UNSPECIFIED ACUTE CONJUNCTIVITIS TYPE: ICD-10-CM

## 2023-10-17 DIAGNOSIS — J20.9 ACUTE BRONCHITIS, UNSPECIFIED ORGANISM: ICD-10-CM

## 2023-10-17 DIAGNOSIS — H53.8 BLURRED VISION, RIGHT EYE: ICD-10-CM

## 2023-10-17 LAB
SARS-COV-2 AG UPPER RESP QL IA: NEGATIVE
VALID CONTROL: NORMAL

## 2023-10-17 PROCEDURE — 99204 OFFICE O/P NEW MOD 45 MIN: CPT | Performed by: FAMILY MEDICINE

## 2023-10-17 PROCEDURE — 87811 SARS-COV-2 COVID19 W/OPTIC: CPT | Performed by: FAMILY MEDICINE

## 2023-10-17 PROCEDURE — 71046 X-RAY EXAM CHEST 2 VIEWS: CPT

## 2023-10-17 RX ORDER — AMOXICILLIN AND CLAVULANATE POTASSIUM 875; 125 MG/1; MG/1
1 TABLET, FILM COATED ORAL EVERY 12 HOURS SCHEDULED
Qty: 20 TABLET | Refills: 0 | Status: SHIPPED | OUTPATIENT
Start: 2023-10-17 | End: 2023-10-27

## 2023-10-17 RX ORDER — NEOMYCIN SULFATE, POLYMYXIN B SULFATE AND DEXAMETHASONE 3.5; 10000; 1 MG/ML; [USP'U]/ML; MG/ML
SUSPENSION/ DROPS OPHTHALMIC
COMMUNITY
Start: 2023-10-17

## 2023-10-17 RX ORDER — PREDNISONE 20 MG/1
20 TABLET ORAL 2 TIMES DAILY WITH MEALS
Qty: 10 TABLET | Refills: 0 | Status: SHIPPED | OUTPATIENT
Start: 2023-10-17 | End: 2023-10-22

## 2023-10-17 NOTE — PATIENT INSTRUCTIONS
schedule the MRI of the eye as ordered    Start taking Augmentin every 12 hours with food for 10 days. Follow-up with ophthalmology MD    Take probiotics or yogurt for at least 30 days. Florastor is a good brand of probiotics to be taken twice a day is available over-the-counter. For your costochondritis you are okay to take ibuprofen 800 mg every 8 hours or naproxen 500 mg twice a day for the next 7 days. All of this is going to increase your risk of gastritis and flareup your reflux. You can take omeprazole 40 mg oral daily while on this treatment and additional Pepcid 20 mg if necessary.   If you still have reflux symptoms despite it you can take some Tums

## 2023-10-17 NOTE — PROGRESS NOTES
Subjective:      Patient ID: Celeste Dukes is a 34 y.o. female. Has 3-4 days of cough, congestion, chest pain -right side-reproducible,   Also has right eye pain, redness, blurred vision for 2 days, saw optometrist Lamar Burton who said it was likely related to her viral illness but gave her eye drops    Shortness of Breath  Associated symptoms include chest pain and coughing. Pertinent negatives include no fatigue, leg swelling, palpitations, rhinorrhea, sore throat or wheezing. Cough  Associated symptoms include chest pain, eye redness and shortness of breath. Pertinent negatives include no fever, headaches, myalgias, rash, rhinorrhea, sore throat or wheezing. Chest Pain   Associated symptoms include a cough and shortness of breath. Pertinent negatives include no abdominal pain, back pain, fever, headaches, nausea or palpitations. Pertinent negatives for past medical history include no seizures. Past Medical History:   Diagnosis Date    Chronic back pain     Colitis     History of transfusion     Multiple abrasions 2013    Proteinuria     Urinary tract infection     pylonephritis       Family History   Problem Relation Age of Onset    Hypertension Mother     Arthritis Mother     Depression Mother     COPD Mother     Gestational diabetes Mother     Lung cancer Paternal Grandmother     Cancer Paternal Grandmother     COPD Father     Depression Sister     Anxiety disorder Sister     Depression Brother     Bipolar disorder Brother     Depression Brother     No Known Problems Maternal Grandmother     COPD Maternal Grandfather     Cancer Maternal Grandfather     Heart disease Paternal Grandfather     Depression Brother        Past Surgical History:   Procedure Laterality Date    DILATION AND CURETTAGE OF UTERUS N/A 8/23/2021    Procedure: DILATATION AND CURETTAGE (D&C);   Surgeon: Ilya Lawrence MD;  [facility]: [facility] ;  Service: Obstetrics    EXAMINATION UNDER ANESTHESIA N/A 8/23/2021 Procedure: EXAM UNDER ANESTHESIA (EUA) WITH INSERTION OF BAKRI BALLOON;  Surgeon: Brennan Mariano MD;  Location: [facility] ;  Service: Obstetrics    TONSILLECTOMY  2009    WISDOM TOOTH EXTRACTION          reports that she quit smoking about 2 years ago. Her smoking use included cigarettes. She has never used smokeless tobacco. She reports that she does not currently use alcohol. She reports that she does not currently use drugs after having used the following drugs: Marijuana. Current Outpatient Medications:     amoxicillin-clavulanate (AUGMENTIN) 875-125 mg per tablet, Take 1 tablet by mouth every 12 (twelve) hours for 10 days, Disp: 20 tablet, Rfl: 0    ketoconazole (NIZORAL) 2 % shampoo, as needed, Disp: , Rfl:     neomycin-polymyxin-dexamethasone (MAXITROL) ophthalmic suspension, , Disp: , Rfl:     omeprazole (PriLOSEC) 20 mg delayed release capsule, Take 20 mg by mouth as needed PRN, Disp: , Rfl:     predniSONE 20 mg tablet, Take 1 tablet (20 mg total) by mouth 2 (two) times a day with meals for 5 days, Disp: 10 tablet, Rfl: 0    mesalamine (PENTASA) 500 mg CR capsule, Take 2 capsules (1,000 mg total) by mouth 4 (four) times a day (Patient not taking: Reported on 10/17/2023), Disp: 240 capsule, Rfl: 1    pantoprazole (PROTONIX) 40 mg tablet, Take 1 tablet (40 mg total) by mouth daily (Patient not taking: Reported on 10/17/2023), Disp: 90 tablet, Rfl: 0    The following portions of the patient's history were reviewed and updated as appropriate: allergies, current medications, past family history, past medical history, past social history, past surgical history and problem list.    Review of Systems   Constitutional:  Negative for fatigue and fever. HENT:  Negative for congestion, facial swelling, mouth sores, rhinorrhea, sore throat and trouble swallowing. Eyes:  Positive for pain, discharge, redness and visual disturbance. Negative for photophobia and itching.    Respiratory:  Positive for cough and shortness of breath. Negative for wheezing. Cardiovascular:  Positive for chest pain. Negative for palpitations and leg swelling. Gastrointestinal:  Negative for abdominal pain, blood in stool, constipation, diarrhea and nausea. Genitourinary:  Negative for dysuria, hematuria and urgency. Musculoskeletal:  Negative for arthralgias, back pain and myalgias. Skin:  Negative for rash and wound. Neurological:  Negative for seizures, syncope and headaches. Hematological:  Negative for adenopathy. Psychiatric/Behavioral:  Negative for agitation and behavioral problems. PHQ-2/9 Depression Screening    Little interest or pleasure in doing things: 0 - not at all  Feeling down, depressed, or hopeless: 0 - not at all  Trouble falling or staying asleep, or sleeping too much: 0 - not at all  Feeling tired or having little energy: 0 - not at all  Poor appetite or overeatin - not at all  Feeling bad about yourself - or that you are a failure or have let yourself or your family down: 0 - not at all  Trouble concentrating on things, such as reading the newspaper or watching television: 0 - not at all  Moving or speaking so slowly that other people could have noticed. Or the opposite - being so fidgety or restless that you have been moving around a lot more than usual: 0 - not at all  Thoughts that you would be better off dead, or of hurting yourself in some way: 0 - not at all  PHQ-9 Score: 0   PHQ-9 Interpretation: No or Minimal depression                Objective:    /80 (BP Location: Left arm, Patient Position: Sitting, Cuff Size: Large)   Pulse 104   Temp 98 °F (36.7 °C) (Tympanic)   Resp 20   Ht 5' 6" (1.676 m)   Wt 110 kg (242 lb)   LMP 10/17/2023   SpO2 98%   Breastfeeding No   BMI 39.06 kg/m²      Physical Exam  Vitals and nursing note reviewed. Constitutional:       Appearance: Normal appearance. She is well-developed. She is not ill-appearing.    HENT:      Head: Normocephalic and atraumatic. Right Ear: Tympanic membrane, ear canal and external ear normal.      Left Ear: Tympanic membrane, ear canal and external ear normal.      Nose: Nose normal.      Mouth/Throat:      Mouth: Mucous membranes are moist.      Pharynx: No oropharyngeal exudate or posterior oropharyngeal erythema. Eyes:      General: No scleral icterus. Right eye: No discharge. Left eye: No discharge. Conjunctiva/sclera: Conjunctivae normal.   Cardiovascular:      Rate and Rhythm: Normal rate. Heart sounds: No murmur heard. No gallop. Pulmonary:      Effort: Pulmonary effort is normal. No respiratory distress. Breath sounds: No stridor. Rhonchi present. No wheezing or rales. Chest:      Chest wall: Tenderness present. No mass, lacerations, deformity, swelling, crepitus or edema. Abdominal:      Palpations: Abdomen is soft. Tenderness: There is no abdominal tenderness. Musculoskeletal:         General: No tenderness or deformity. Skin:     Findings: No erythema or rash. Neurological:      Mental Status: She is alert. Mental status is at baseline.    Psychiatric:         Behavior: Behavior normal.         Judgment: Judgment normal.           Recent Results (from the past 8736 hour(s))   POCT pregnancy, urine    Collection Time: 09/06/23 10:43 AM   Result Value Ref Range    EXT Preg Test, Ur Negative Negative    Control Valid Valid   Tissue Exam    Collection Time: 09/06/23 11:04 AM   Result Value Ref Range    Case Report       Surgical Pathology Report                         Case: [id number]                                   Authorizing Provider:  Nikia Snider MD       Collected:           09/06/2023 1104              Ordering Location:     [facility]   Received:            09/06/2023 [address] [place] [address]                                                                       Pathologist:           Julio César Jamil MD Specimens:   A) - Duodenum, duodenum bx r/o celiac                                                               B) - Stomach, gastric bx r/o h pylori                                                               C) - Esophagus, lower esophagus r/o eoe                                                             D) - Esophagus, mid esophagus r/o eoe                                                               E) - Large Intestine, Cecum, cecal bx                                                                F) - Large Intestine, Right/Ascending Colon, ascending colon bx                                     G) - Large Intestine, Transverse Colon, transverse colon bx                                         H) - Large Intestine, Left/Descending Colon, descending colon bx                                    I) - Large Intestine, Sigmoid Colon, sigmoid colon bx                                               J) - Rectum, rectum bx r/o crohns                                                          Final Diagnosis       A. Duodenum, biopsy:  -   Duodenal mucosa with normal villous architecture and no significant histopathologic change. -   No evidence of celiac disease. B. Stomach, biopsy:  -   Gastric antral and oxyntic mucosa with chronic inactive gastritis. -   Gastric  oxyntic mucosa with no significant histopathologic change. -   Negative for intestinal metaplasia and dysplasia. -   Helicobacter pylori immunostain is negative. C. Esophagus, lower, biopsy:  -   Squamous mucosa with basal layer hyperplasia, spongiosis, elongated papillae, and few intraepithelial eosinophils suggestive of reflux esophagitis. D. Esophagus, mid, biopsy:  -   Squamous mucosa with mild reactive changes and rare eosinophils.  -   No evidence of eosinophilic esophagitis.     E. Large Intestine, Cecum, biopsy:  -   Colonic mucosa with expanded expanded lymphoid aggregates, favor reactive, see comment.  -   No evidence of acute, chronic, or microscopic colitis. F. Large Intestine, ascending colon, biopsy:  -   Colonic mucosa with lamina propria expansion by a lymphoplasmacytic infiltrate with expanded lymphoid aggregates. -   No evidence of acute or microscopic colitis. G. Large Intestine, Transverse Colon, biopsy:  -   Colonic mucosa with no significant histopathologic change. -   No evidence of acute, chronic, or microscopic colitis. H. Large Intestine, descending colon, biopsy:  -   Colonic mucosa with no significant histopathologic change. -   No evidence of acute, chronic, or microscopic colitis. I. Large Intestine, Sigmoid Colon, biopsy:  -   Colonic mucosa with no significant histopathologic change. -   No evidence of acute, chronic, or microscopic colitis. J. Rectum, biopsy:  -   Colorectal mucosa with no significant histopathologic change. -   No evidence of acute, chronic, or microscopic colitis. Comment: The patient's presentation of hematochezia, loose stools, and abnormal serology testing are noted. The current biopsy series shows focal changes of expanded lymphoplasmacytic infiltrate and expanded lymphoid aggregates. Immunohistochemical stains for lymphoproliferative disorder are performed and within normal limits at this time. There is no evidence of acute colitis or well-developed chronic colitis in the reviewed material. Recommend continued clinical follow-up for the patient symptoms; if the patient's expanded lymphoid aggregates persists further work-up may be performed at a later date. Clinical and endoscopic correlation is advised. Note       Immunohistochemical stains performed on part E show:  CD20 highlights B cells, which are expanded but concentrated in follicles. CD3 and CD5 stains highlight increased T cells that are predominantly in the interfollicular areas.   BCL6 and CD10 label germinal centers which are appropriately negative for BCL2. BCL-1 is negative. Ki-67 proliferative index is appropriately high in germinal centers. Additional Information       All reported additional testing was performed with appropriately reactive controls. These tests were developed and their performance characteristics determined by [facility] or appropriate performing facility, though some tests may be performed on tissues which have not been validated for performance characteristics (such as staining performed on alcohol exposed cell blocks and decalcified tissues). Results should be interpreted with caution and in the context of the patients’ clinical condition. These tests may not be cleared or approved by the U.S. Food and Drug Administration, though the FDA has determined that such clearance or approval is not necessary. These tests are used for clinical purposes and they should not be regarded as investigational or for research. This laboratory has been approved by [place], designated as a high-complexity laboratory and is qualified to perform these tests. .Interpretation performed at [facility], [facility] [address] [address]        Gross Description       A. The specimen is received in formalin, labeled with the patient's name and hospital number, and is designated " duodenum biopsy rule out celiac". The specimen consists of multiple tan friable soft tissue fragments measuring 1.1 x 0.6 x 0.2 cm in aggregate. Entirely submitted. Screened cassette. B. The specimen is received in formalin, labeled with the patient's name and hospital number, and is designated " gastric biopsy rule out H. pylori". The specimen consists of 2 tan friable soft tissue fragments, each measuring 0.4 cm in greatest dimension. Entirely submitted. Screened cassette. C. The specimen is received in formalin, labeled with the patient's name and hospital number, and is designated " lower esophagus rule out EOE".   The specimen consists of 2 colorless to tan friable soft tissue fragments ranging from 0.3 to 0.5 cm in greatest dimension. Entirely submitted. Screened cassette. D. The specimen is received in formalin, labeled with the patient's name and hospital number, and is designated " mid esophagus rule out EOE". The specimen consists of 2 colorless to tan friable soft tissue fragments ranging from 0.4 to 0.5 cm in greatest dimension. Entirely submitted. Screened cassette. E. The specimen is received in formalin, labeled with the patient's name and hospital number, and is designated " cecal biopsy". The specimen consists of 3 tan friable soft tissue fragments ranging from 0.3 to 0.4 cm in greatest dimension. Entirely submitted. Screened cassette. F. The specimen is received in formalin, labeled with the patient's name and hospital number, and is designated " ascending colon biopsy". The specimen consists of multiple tan friable soft tissue fragments measuring 1.0 x 0.4 x 0.2 cm in aggregate. Entirely submitted. Screened cassette. G. The specimen is received in formalin, labeled with the patient's name and hospital number, and is designated " transverse colon biopsy". The specimen consists of 3 tan friable soft tissue fragments ranging from 0.3 to 0.5 cm in greatest dimension. Entirely submitted. Screened cassette. H. The specimen is received in formalin, labeled with the patient's name and hospital number, and is designated " descending colon biopsy". The specimen consists of 2 tan friable soft tissue fragments, each measuring 0.4 cm in greatest dimension. Entirely submitted. Screened cassette. I. The specimen is received in formalin, labeled with the patient's name and hospital number, and is designated " sigmoid colon biopsy". The specimen consists of 2 tan friable soft tissue fragments, each measuring 0.4 cm in greatest dimension. Entirely submitted. Screened cassette.   J. The specimen is received in formalin, labeled with the patient's name and hospital number, and is designated " rectum biopsy rule out Crohn's". The specimen consists of 3 tan friable soft tissue fragments, each measuring 0.3 cm in greatest dimension. Entirely submitted. Screened cassette. Note: The estimated total formalin fixation time based upon information provided by the submitting clinician and the standard processing schedule is under 72 hours. SSarginson      Clinical Information       History of loose stools, hematochezia, positive IBD panel and iron deficiency anemia-raising the possibility of undiagnosed inflammatory bowel disease given family history of ulcerative colitis in mother. POCT Rapid Covid Ag    Collection Time: 10/17/23  5:05 PM   Result Value Ref Range    POCT SARS-CoV-2 Ag Negative Negative    VALID CONTROL Valid        Laboratory Results: I have personally reviewed the pertinent laboratory results/reports       Assessment/Plan:  Problem List Items Addressed This Visit    None  Visit Diagnoses       Acute bronchitis, unspecified organism    -  Primary    Relevant Medications    amoxicillin-clavulanate (AUGMENTIN) 875-125 mg per tablet    predniSONE 20 mg tablet    Other Relevant Orders    POCT Rapid Covid Ag (Completed)    XR chest pa & lateral    Blurred vision, right eye        Relevant Orders    MRI orbits wo and w contrast    Acute conjunctivitis of right eye, unspecified acute conjunctivitis type        Relevant Medications    neomycin-polymyxin-dexamethasone (MAXITROL) ophthalmic suspension    Other Relevant Orders    MRI orbits wo and w contrast        She saw optometrist and is on antibiotic-steroid eye drop. Will also start oral prednisone if this uveitis. schedule the MRI of the eye as ordered    Start taking Augmentin every 12 hours with food for 10 days. Follow-up with ophthalmology MD  High risk of H.influenza bacterial infection, check xray  Take probiotics or yogurt for at least 30 days.   Florastor is a good brand of probiotics to be taken twice a day is available over-the-counter. For your costochondritis you are okay to take ibuprofen 800 mg every 8 hours or naproxen 500 mg twice a day for the next 7 days. All of this is going to increase your risk of gastritis and flareup your reflux. You can take omeprazole 40 mg oral daily while on this treatment and additional Pepcid 20 mg if necessary. If you still have reflux symptoms despite it you can take some Tums    F/u in 2 weeks, sooner if symptoms not improving  If vision is worse will need to go to ED. Read package inserts for all medications before starting a new medications, call me if you have any questions. Patient was given opportunity to ask questions and all questions were answered. Disclaimer: Portions of the record may have been created with voice recognition software. Occasional wrong word or "sound a like" substitutions may have occurred due to the inherent limitations of voice recognition software. Read the chart carefully and recognize, using context, where substitutions have occurred. I have used the Epic copy/forward function to compose this note. I have reviewed my current note to ensure it reflects the current patient status, exam, assessment and plan.

## 2023-11-08 ENCOUNTER — HOSPITAL ENCOUNTER (OUTPATIENT)
Dept: MRI IMAGING | Facility: HOSPITAL | Age: 28
Discharge: HOME | End: 2023-11-08
Payer: COMMERCIAL

## 2023-11-08 DIAGNOSIS — H53.8 BLURRED VISION, RIGHT EYE: ICD-10-CM

## 2023-11-08 DIAGNOSIS — H10.31 ACUTE CONJUNCTIVITIS OF RIGHT EYE, UNSPECIFIED ACUTE CONJUNCTIVITIS TYPE: ICD-10-CM

## 2023-11-08 PROCEDURE — G1004 CDSM NDSC: HCPCS

## 2023-11-08 PROCEDURE — A9585 GADOBUTROL INJECTION: HCPCS | Performed by: RADIOLOGY

## 2023-11-08 PROCEDURE — 70543 MRI ORBT/FAC/NCK W/O &W/DYE: CPT

## 2023-11-08 RX ORDER — GADOBUTROL 604.72 MG/ML
11 INJECTION INTRAVENOUS
Status: COMPLETED | OUTPATIENT
Start: 2023-11-08 | End: 2023-11-08

## 2023-11-08 RX ADMIN — GADOBUTROL 11 ML: 604.72 INJECTION INTRAVENOUS at 19:27

## 2023-12-06 ENCOUNTER — HOSPITAL ENCOUNTER (OUTPATIENT)
Dept: MRI IMAGING | Facility: HOSPITAL | Age: 28
Discharge: HOME | End: 2023-12-06
Attending: INTERNAL MEDICINE
Payer: COMMERCIAL

## 2023-12-06 DIAGNOSIS — K63.3 EROSION OF TERMINAL ILEUM: ICD-10-CM

## 2023-12-06 PROCEDURE — A9585 GADOBUTROL INJECTION: HCPCS | Performed by: INTERNAL MEDICINE

## 2023-12-06 PROCEDURE — G1004 CDSM NDSC: HCPCS

## 2023-12-06 PROCEDURE — 72197 MRI PELVIS W/O & W/DYE: CPT

## 2023-12-06 PROCEDURE — 74183 MRI ABD W/O CNTR FLWD CNTR: CPT

## 2023-12-06 RX ORDER — GADOBUTROL 604.72 MG/ML
11 INJECTION INTRAVENOUS
Status: COMPLETED | OUTPATIENT
Start: 2023-12-06 | End: 2023-12-06

## 2023-12-06 RX ADMIN — GADOBUTROL 11 ML: 604.72 INJECTION INTRAVENOUS at 09:23

## 2023-12-06 RX ADMIN — GLUCAGON 1 MG: KIT at 09:14

## 2023-12-14 ENCOUNTER — TELEPHONE (OUTPATIENT)
Dept: GASTROENTEROLOGY | Facility: CLINIC | Age: 28
End: 2023-12-14

## 2023-12-14 DIAGNOSIS — K52.9 IBD (INFLAMMATORY BOWEL DISEASE): Primary | ICD-10-CM

## 2023-12-14 NOTE — TELEPHONE ENCOUNTER
----- Message from Ileana Gee sent at 12/14/2023  8:32 AM EST -----    ----- Message -----  From: Jose Luis Rosas MD  Sent: 12/14/2023   8:06 AM EST  To: [facility]    Please schedule office visit with me in the next 1 to 2 weeks. I discussed the results of the MRI already with the patient.

## 2023-12-14 NOTE — TELEPHONE ENCOUNTER
Pt had appt with Dr Alpa Tabor in [address] [facility], rescheduled to 12/26.

## 2023-12-26 ENCOUNTER — APPOINTMENT (OUTPATIENT)
Dept: LAB | Facility: HOSPITAL | Age: 28
End: 2023-12-26
Payer: COMMERCIAL

## 2023-12-26 DIAGNOSIS — K52.9 IBD (INFLAMMATORY BOWEL DISEASE): ICD-10-CM

## 2023-12-26 DIAGNOSIS — K63.3 EROSION OF TERMINAL ILEUM: ICD-10-CM

## 2023-12-26 LAB
ALBUMIN SERPL BCP-MCNC: 4.4 G/DL (ref 3.5–5)
ALP SERPL-CCNC: 117 U/L (ref 34–104)
ALT SERPL W P-5'-P-CCNC: 19 U/L (ref 7–52)
ANION GAP SERPL CALCULATED.3IONS-SCNC: 8 MMOL/L
AST SERPL W P-5'-P-CCNC: 13 U/L (ref 13–39)
BILIRUB SERPL-MCNC: 0.2 MG/DL (ref 0.2–1)
BUN SERPL-MCNC: 14 MG/DL (ref 5–25)
CALCIUM SERPL-MCNC: 9.3 MG/DL (ref 8.4–10.2)
CHLORIDE SERPL-SCNC: 104 MMOL/L (ref 96–108)
CO2 SERPL-SCNC: 26 MMOL/L (ref 21–32)
CREAT SERPL-MCNC: 0.9 MG/DL (ref 0.6–1.3)
ERYTHROCYTE [DISTWIDTH] IN BLOOD BY AUTOMATED COUNT: 14.2 % (ref 11.6–15.1)
GFR SERPL CREATININE-BSD FRML MDRD: 88 ML/MIN/1.73SQ M
GLUCOSE SERPL-MCNC: 87 MG/DL (ref 65–140)
HCT VFR BLD AUTO: 41.5 % (ref 34.8–46.1)
HGB BLD-MCNC: 13.4 G/DL (ref 11.5–15.4)
MCH RBC QN AUTO: 26.4 PG (ref 26.8–34.3)
MCHC RBC AUTO-ENTMCNC: 32.3 G/DL (ref 31.4–37.4)
MCV RBC AUTO: 82 FL (ref 82–98)
PLATELET # BLD AUTO: 312 THOUSANDS/UL (ref 149–390)
PMV BLD AUTO: 11.8 FL (ref 8.9–12.7)
POTASSIUM SERPL-SCNC: 3.9 MMOL/L (ref 3.5–5.3)
PROT SERPL-MCNC: 7.5 G/DL (ref 6.4–8.4)
RBC # BLD AUTO: 5.07 MILLION/UL (ref 3.81–5.12)
SODIUM SERPL-SCNC: 138 MMOL/L (ref 135–147)
WBC # BLD AUTO: 12.34 THOUSAND/UL (ref 4.31–10.16)

## 2023-12-26 PROCEDURE — 86480 TB TEST CELL IMMUN MEASURE: CPT

## 2023-12-26 PROCEDURE — 36415 COLL VENOUS BLD VENIPUNCTURE: CPT

## 2023-12-26 PROCEDURE — 87340 HEPATITIS B SURFACE AG IA: CPT

## 2023-12-26 PROCEDURE — 86803 HEPATITIS C AB TEST: CPT

## 2023-12-26 PROCEDURE — 85027 COMPLETE CBC AUTOMATED: CPT

## 2023-12-26 PROCEDURE — 86706 HEP B SURFACE ANTIBODY: CPT

## 2023-12-26 PROCEDURE — 80053 COMPREHEN METABOLIC PANEL: CPT

## 2023-12-26 PROCEDURE — 86705 HEP B CORE ANTIBODY IGM: CPT

## 2023-12-26 PROCEDURE — 86704 HEP B CORE ANTIBODY TOTAL: CPT

## 2023-12-27 ENCOUNTER — OFFICE VISIT (OUTPATIENT)
Dept: GASTROENTEROLOGY | Facility: CLINIC | Age: 28
End: 2023-12-27
Payer: COMMERCIAL

## 2023-12-27 VITALS
HEART RATE: 98 BPM | WEIGHT: 240.6 LBS | SYSTOLIC BLOOD PRESSURE: 107 MMHG | BODY MASS INDEX: 38.67 KG/M2 | HEIGHT: 66 IN | DIASTOLIC BLOOD PRESSURE: 91 MMHG

## 2023-12-27 DIAGNOSIS — K27.9 PUD (PEPTIC ULCER DISEASE): ICD-10-CM

## 2023-12-27 DIAGNOSIS — D50.9 IRON DEFICIENCY ANEMIA, UNSPECIFIED IRON DEFICIENCY ANEMIA TYPE: ICD-10-CM

## 2023-12-27 DIAGNOSIS — K50.019 CROHN'S DISEASE OF SMALL INTESTINE WITH COMPLICATION (HCC): Primary | ICD-10-CM

## 2023-12-27 LAB
GAMMA INTERFERON BACKGROUND BLD IA-ACNC: 0.01 IU/ML
HBV CORE AB SER QL: NORMAL
HBV CORE IGM SER QL: NORMAL
HBV SURFACE AB SER-ACNC: 4.47 MIU/ML
HBV SURFACE AG SER QL: NORMAL
HCV AB SER QL: NORMAL
M TB IFN-G BLD-IMP: NEGATIVE
M TB IFN-G CD4+ BCKGRND COR BLD-ACNC: 0 IU/ML
M TB IFN-G CD4+ BCKGRND COR BLD-ACNC: 0 IU/ML
MITOGEN IGNF BCKGRD COR BLD-ACNC: 7.27 IU/ML

## 2023-12-27 PROCEDURE — 99215 OFFICE O/P EST HI 40 MIN: CPT | Performed by: INTERNAL MEDICINE

## 2023-12-27 RX ORDER — PANTOPRAZOLE SODIUM 40 MG/1
40 TABLET, DELAYED RELEASE ORAL DAILY
Qty: 90 TABLET | Refills: 0 | Status: SHIPPED | OUTPATIENT
Start: 2023-12-27 | End: 2024-03-26

## 2023-12-27 RX ORDER — PREDNISONE 5 MG/1
TABLET ORAL DAILY
Qty: 252 TABLET | Refills: 0 | Status: SHIPPED | OUTPATIENT
Start: 2023-12-27 | End: 2024-02-21

## 2023-12-27 NOTE — PROGRESS NOTES
[facility]  Progress Note - Vickie Stock 28 y.o. female MRN: [id number]    Unit/Bed#:  Encounter: [id number]    Assessment/Plan:    1.  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  Colonic biopsies were notable only for lymphoplasmacytic infiltrates without any granulomas or evidence of acute/chronic or microscopic colitis.  Given positive inflammatory bowel disease panel, stricturing inflammatory segment within the terminal ileum noted both endoscopically and on MR E- suspect likely Crohn's disease.  Patient was prescribed mesalamine at the time of the diagnosis in September however never picked up the prescription and has not been taking any mesalamine agents.      -Would recommend starting on prednisone taper while waiting for approval for infliximab.  Patient prefers infusion versus injection.  -Discussed risks/benefits/alternatives to infliximab/adalimumab versus vedolizumab.  Potential risks of medications including increased risk of infection, reactivation of hepatitis, tuberculosis, allergic reaction, rare risk of lymphoma, worsening liver enzymes, blood cell count abnormalities, discussed with patient.  -Discussed with patient that once we receive the results of gold QuantiFERON, we will work on getting approval for infliximab.  -Would recommend avoiding live vaccines once started on infliximab.  -Would recommend influenza vaccine.  -Recommend checking vitamin D levels.    2.  Duodenal ulcers: Patient reports that she is only been taking Prilosec on as-needed basis, has not been taking pantoprazole that was previously prescribed.  Would recommend to restart on pantoprazole.  Continue this for at least 3 months.  Avoid the use of NSAIDs.    3.  Follow-up in 2 months.         Subjective:     This is a 28-year-old female with history of iron deficiency anemia who underwent  "EGD and colonoscopy for positive IBD panel and for anemia workup presents for follow-up.    Patient reports that she has generally been feeling well however does report intermittent abdominal pain, reports that the diarrhea has slowed down.  Denies any rectal bleeding.    She underwent EGD and colonoscopy in September 2023 for workup of anemia and positive IBD panel.    EGD was notable for multiple duodenal ulcers and gastric erosions.  Colonoscopy in September 2023 was notable for stricturing of the ileocecal valve along with mild edema in the cecum, ascending colon and proximal transverse colon to mid transverse colon.  Biopsies from the colon did not show any acute or chronic changes of colitis.    MR BEKAH performed at this month did show moderately active inflammatory bowel disease involving 15 to 20 cm of the terminal ileum.  There was also evidence of inflammatory stricturing.  She underwent blood work yesterday which was notable for mild leukocytosis of her hemoglobin is now normal at 13.4, platelets are normal.  Alkaline phosphatase is mildly elevated however AST and ALT are normal, albumin is normal.  Chronic hepatitis panel was normal.  Hepatitis B surface antibody is normal.  Goal QuantiFERON is still in process.  Stool studies were not done.    She was prescribed pantoprazole 40 mg on daily basis for duodenal ulcers which she reports she has not picked up.  She was also prescribed Pentasa 4 g daily however patient reports that she did not start taking this.  He does not recall picking it up.  She reports intermittent abdominal pain but denies any diarrhea, rectal bleeding, nausea or vomiting.    Of note, patient's mother carries a diagnosis of ulcerative colitis.    Objective:     Vitals: Blood pressure 107/91, pulse 98, height 5' 6\" (1.676 m), weight 109 kg (240 lb 9.6 oz), not currently breastfeeding.,Body mass index is 38.83 kg/m².    [unfilled]    Physical Exam:    GEN: wn/wd, NAD  HEENT: MMM, no " cervical or supraclavicular LAD, anciteric  CV: RRR, no m/r/g  CHEST: CTA b/l, no w/r/r  ABD: +BS, soft, NT/ND, no hepatosplenomegaly  EXT: no c/c/e  SKIN: no rashes  NEURO: aaox3      Invasive Devices       Drain  Duration             Intrauterine postpartum balloon 08/23/21 1903 400 mL 855 days                            Lab, Imaging and other studies:     No visits with results within 1 Day(s) from this visit.   Latest known visit with results is:   Appointment on 12/26/2023   Component Date Value    WBC 12/26/2023 12.34 (H)     RBC 12/26/2023 5.07     Hemoglobin 12/26/2023 13.4     Hematocrit 12/26/2023 41.5     MCV 12/26/2023 82     MCH 12/26/2023 26.4 (L)     MCHC 12/26/2023 32.3     RDW 12/26/2023 14.2     Platelets 12/26/2023 312     MPV 12/26/2023 11.8     Sodium 12/26/2023 138     Potassium 12/26/2023 3.9     Chloride 12/26/2023 104     CO2 12/26/2023 26     ANION GAP 12/26/2023 8     BUN 12/26/2023 14     Creatinine 12/26/2023 0.90     Glucose 12/26/2023 87     Calcium 12/26/2023 9.3     AST 12/26/2023 13     ALT 12/26/2023 19     Alkaline Phosphatase 12/26/2023 117 (H)     Total Protein 12/26/2023 7.5     Albumin 12/26/2023 4.4     Total Bilirubin 12/26/2023 0.20     eGFR 12/26/2023 88     Hep B S Ab 12/26/2023 4.47     Hepatitis B Surface Ag 12/26/2023 Non-reactive     Hepatitis C Ab 12/26/2023 Non-reactive     Hep B C IgM 12/26/2023 Non-reactive     Hep B Core Total Ab 12/26/2023 Non-reactive          I have personally reviewed pertinent films in PACS    No current facility-administered medications for this visit.

## 2024-01-08 ENCOUNTER — NURSE TRIAGE (OUTPATIENT)
Age: 29
End: 2024-01-08

## 2024-01-08 ENCOUNTER — APPOINTMENT (OUTPATIENT)
Dept: LAB | Facility: CLINIC | Age: 29
End: 2024-01-08
Payer: COMMERCIAL

## 2024-01-08 ENCOUNTER — APPOINTMENT (OUTPATIENT)
Dept: RADIOLOGY | Facility: HOSPITAL | Age: 29
End: 2024-01-08
Payer: COMMERCIAL

## 2024-01-08 DIAGNOSIS — K27.9 PUD (PEPTIC ULCER DISEASE): ICD-10-CM

## 2024-01-08 DIAGNOSIS — K50.019 CROHN'S DISEASE OF SMALL INTESTINE WITH COMPLICATION (HCC): ICD-10-CM

## 2024-01-08 LAB
25(OH)D3 SERPL-MCNC: 12.4 NG/ML (ref 30–100)
ALBUMIN SERPL BCP-MCNC: 4.3 G/DL (ref 3.5–5)
ALP SERPL-CCNC: 106 U/L (ref 34–104)
ALT SERPL W P-5'-P-CCNC: 15 U/L (ref 7–52)
ANION GAP SERPL CALCULATED.3IONS-SCNC: 9 MMOL/L
AST SERPL W P-5'-P-CCNC: 10 U/L (ref 13–39)
BILIRUB SERPL-MCNC: 0.26 MG/DL (ref 0.2–1)
BUN SERPL-MCNC: 19 MG/DL (ref 5–25)
CALCIUM SERPL-MCNC: 9.1 MG/DL (ref 8.4–10.2)
CHLORIDE SERPL-SCNC: 101 MMOL/L (ref 96–108)
CO2 SERPL-SCNC: 23 MMOL/L (ref 21–32)
CREAT SERPL-MCNC: 0.81 MG/DL (ref 0.6–1.3)
CRP SERPL QL: 14 MG/L
ERYTHROCYTE [DISTWIDTH] IN BLOOD BY AUTOMATED COUNT: 14.6 % (ref 11.6–15.1)
GFR SERPL CREATININE-BSD FRML MDRD: 100 ML/MIN/1.73SQ M
GLUCOSE SERPL-MCNC: 98 MG/DL (ref 65–140)
HCT VFR BLD AUTO: 43.2 % (ref 34.8–46.1)
HGB BLD-MCNC: 13.7 G/DL (ref 11.5–15.4)
MCH RBC QN AUTO: 26 PG (ref 26.8–34.3)
MCHC RBC AUTO-ENTMCNC: 31.7 G/DL (ref 31.4–37.4)
MCV RBC AUTO: 82 FL (ref 82–98)
PLATELET # BLD AUTO: 292 THOUSANDS/UL (ref 149–390)
PMV BLD AUTO: 11.6 FL (ref 8.9–12.7)
POTASSIUM SERPL-SCNC: 4.2 MMOL/L (ref 3.5–5.3)
PROT SERPL-MCNC: 7.3 G/DL (ref 6.4–8.4)
RBC # BLD AUTO: 5.27 MILLION/UL (ref 3.81–5.12)
SODIUM SERPL-SCNC: 133 MMOL/L (ref 135–147)
WBC # BLD AUTO: 13.23 THOUSAND/UL (ref 4.31–10.16)

## 2024-01-08 PROCEDURE — 86140 C-REACTIVE PROTEIN: CPT

## 2024-01-08 PROCEDURE — 82306 VITAMIN D 25 HYDROXY: CPT

## 2024-01-08 PROCEDURE — 36415 COLL VENOUS BLD VENIPUNCTURE: CPT

## 2024-01-08 PROCEDURE — 85027 COMPLETE CBC AUTOMATED: CPT

## 2024-01-08 PROCEDURE — 80053 COMPREHEN METABOLIC PANEL: CPT

## 2024-01-08 NOTE — TELEPHONE ENCOUNTER
Called pt back and relayed further recommendations. I instructed pt to have stool testing done. She confirmed she received containers and will complete them.

## 2024-01-08 NOTE — TELEPHONE ENCOUNTER
Pt last seen 12/27/23 with hx of crohns. She called in with 6/10 constant lower abdominal pain. Nothing seems to worsen pain but passing gas alleviates pain. Pt is not having diarrhea or constipation. She is have bright blood mixed with stool. Yesterday the bleeding was moderate and changed the toilet bowl water to red but today the blood was limited to the surface of the stool. She has nausea but no vomiting. Pt also mentions feeling congestion but no fever or other symptoms. Pt is currently on prednisone taper. She took seven 5 mg pills today.     Pt advised to have labwork/stool testing complete per protocol recommendations. Pt is to also reach out to pcp regarding cold-like symptoms.   She is to continue prednisone and is aware of alarming symptoms that would prompt ED. I explained I would reach out to provider for further recommendations. Pt would also like update on approval for infliximab. I stated I would send msg to our team to get that started.   Reason for Disposition   The patient's abdominal pain is 5-8/10 AND has either 3-14 bms within a 24 hour period, or passing blood or mucus IA.    Answer Questions - Initial Assessment   Are you experiencing abdominal pain: Yes - When did the pain start: gradual  Where is the pain located: RLQ  Does it radiate and where: radiate to upper abdomen and lower back pain   Does the patient recall any inciting factors: unknown   Any aggrevating factors: nothing  Any alleviating factors: flatus  Is the pain: Constant  Pain scale: 6    What is the consistency of your stool: soft     When was your last bm today, are you passing gas: yes    How many bowel movements are you having within a 24 hour period: once daily     Do you see any blood or mucus in your stool and if so how much are you seeing: bright red blood mixed with stool and on surface. Yesterday bright red blood filled the toilet bowl. Today less blood and only on stool.     Do you have a history of hemorrhoids or  anal fissures: denies     Do you have increased urgency: Yes nighttime waking with urgency about one time    Do you have nausea or vomiting and if so how often and what consistency: nausea but no vomiting     What IBD medications are you taking and are you up to date with the past 3 doses: prednisone taper. 35 mg currently.     Have you had any recent diet changes: no     Have you had any recent travel, sick contacts, or recent hospitalizations: pts boyfriend has a cold. Pt mentions feeling congested and has cough.     Have you had any recent heightened stressors: yes     Do you have any fever, chills, sweats, joint pain, rashes: pt feeling hot but unsure if she has fever. Denies other symptoms     Do you have any other concerns: no    Protocols used: IBD

## 2024-01-08 NOTE — TELEPHONE ENCOUNTER
Agree with recommendations. Can also add gas-x. Recommend low fiber/low residue diet to avoid excess gas.  She got lab work today we will follow-up on results tomorrow.  She should also get the stool test that were ordered including infectious studies as well fecal hitesh. Continue steroid taper.

## 2024-01-09 DIAGNOSIS — K50.019 CROHN'S DISEASE OF SMALL INTESTINE WITH COMPLICATION (HCC): Primary | ICD-10-CM

## 2024-01-09 DIAGNOSIS — E55.9 VITAMIN D DEFICIENCY: Primary | ICD-10-CM

## 2024-01-09 RX ORDER — SODIUM CHLORIDE 9 MG/ML
20 INJECTION, SOLUTION INTRAVENOUS ONCE
OUTPATIENT
Start: 2024-01-12

## 2024-01-09 RX ORDER — DIPHENHYDRAMINE HCL 25 MG
25 TABLET ORAL ONCE
OUTPATIENT
Start: 2024-01-12

## 2024-01-09 RX ORDER — ACETAMINOPHEN 325 MG/1
650 TABLET ORAL ONCE
OUTPATIENT
Start: 2024-01-12

## 2024-01-09 RX ORDER — METHYLPREDNISOLONE SODIUM SUCCINATE 40 MG/ML
40 INJECTION, POWDER, LYOPHILIZED, FOR SOLUTION INTRAMUSCULAR; INTRAVENOUS ONCE
OUTPATIENT
Start: 2024-01-12

## 2024-01-09 RX ORDER — ERGOCALCIFEROL 1.25 MG/1
50000 CAPSULE ORAL WEEKLY
Qty: 8 CAPSULE | Refills: 0 | Status: SHIPPED | OUTPATIENT
Start: 2024-01-09

## 2024-01-18 ENCOUNTER — NURSE TRIAGE (OUTPATIENT)
Dept: OTHER | Facility: OTHER | Age: 29
End: 2024-01-18

## 2024-01-18 ENCOUNTER — HOSPITAL ENCOUNTER (EMERGENCY)
Facility: HOSPITAL | Age: 29
Discharge: HOME | End: 2024-01-18
Attending: EMERGENCY MEDICINE
Payer: COMMERCIAL

## 2024-01-18 VITALS
SYSTOLIC BLOOD PRESSURE: 136 MMHG | HEART RATE: 91 BPM | OXYGEN SATURATION: 96 % | DIASTOLIC BLOOD PRESSURE: 91 MMHG | TEMPERATURE: 97.6 F | RESPIRATION RATE: 18 BRPM

## 2024-01-18 DIAGNOSIS — J32.9 SINUSITIS: Primary | ICD-10-CM

## 2024-01-18 DIAGNOSIS — R51.9 ACUTE NONINTRACTABLE HEADACHE, UNSPECIFIED HEADACHE TYPE: ICD-10-CM

## 2024-01-18 DIAGNOSIS — K50.019 CROHN'S DISEASE OF SMALL INTESTINE WITH COMPLICATION (HCC): Primary | ICD-10-CM

## 2024-01-18 PROCEDURE — 99284 EMERGENCY DEPT VISIT MOD MDM: CPT | Performed by: EMERGENCY MEDICINE

## 2024-01-18 PROCEDURE — 99285 EMERGENCY DEPT VISIT HI MDM: CPT

## 2024-01-18 RX ORDER — IBUPROFEN 400 MG/1
400 TABLET ORAL ONCE
Status: COMPLETED | OUTPATIENT
Start: 2024-01-18 | End: 2024-01-18

## 2024-01-18 RX ORDER — AMOXICILLIN AND CLAVULANATE POTASSIUM 875; 125 MG/1; MG/1
1 TABLET, FILM COATED ORAL EVERY 12 HOURS
Qty: 10 TABLET | Refills: 0 | Status: SHIPPED | OUTPATIENT
Start: 2024-01-18 | End: 2024-01-23

## 2024-01-18 RX ORDER — AMOXICILLIN AND CLAVULANATE POTASSIUM 875; 125 MG/1; MG/1
1 TABLET, FILM COATED ORAL ONCE
Status: COMPLETED | OUTPATIENT
Start: 2024-01-18 | End: 2024-01-18

## 2024-01-18 RX ADMIN — AMOXICILLIN AND CLAVULANATE POTASSIUM 1 TABLET: 875; 125 TABLET, FILM COATED ORAL at 08:14

## 2024-01-18 RX ADMIN — IBUPROFEN 400 MG: 400 TABLET, FILM COATED ORAL at 08:13

## 2024-01-18 NOTE — TELEPHONE ENCOUNTER
"Reason for Disposition  • [1] Difficulty breathing AND [2] not from stuffy nose (e.g., not relieved by cleaning out the nose)    Answer Assessment - Initial Assessment Questions  1. LOCATION: \"Where does it hurt?\"       Throat, bridge of nose, behind eyes, forehead, cheeks, top teeth    2. ONSET: \"When did the sinus pain start?\"  (e.g., hours, days)       Friday     3. SEVERITY: \"How bad is the pain?\"   (Scale 1-10; mild, moderate or severe)    - MILD (1-3): doesn't interfere with normal activities     - MODERATE (4-7): interferes with normal activities (e.g., work or school) or awakens from sleep    - SEVERE (8-10): excruciating pain and patient unable to do any normal activities         Severe     4. RECURRENT SYMPTOM: \"Have you ever had sinus problems before?\" If Yes, ask: \"When was the last time?\" and \"What happened that time?\"       Sinus infection approx 1-2 years ago    5. NASAL CONGESTION: \"Is the nose blocked?\" If Yes, ask: \"Can you open it or must you breathe through the mouth?\"      Runny nose    6. NASAL DISCHARGE: \"Do you have discharge from your nose?\" If so ask, \"What color?\"      Yes, green and bloody at times    7. FEVER: \"Do you have a fever?\" If Yes, ask: \"What is it, how was it measured, and when did it start?\"       Doesn't think so at this time    8. OTHER SYMPTOMS: \"Do you have any other symptoms?\" (e.g., sore throat, cough, earache, difficulty breathing)      HA (posterior head down to back of neck), sore throat, moderate SOB even at rest    9. PREGNANCY: \"Is there any chance you are pregnant?\" \"When was your last menstrual period?\"      Denies    Protocol disposition discussed with pt (go to ED now).  Patient was agreeable and will be going to [facility] ER shortly.    Protocols used: Sinus Pain or Congestion-ADULT-[facility]    "

## 2024-01-18 NOTE — TELEPHONE ENCOUNTER
"Regarding: sinus infection  ----- Message from Vickie Moore sent at 1/18/2024  5:41 AM EST -----  \"I tested positive for COVID and my symptoms are still here. I have a lot of pressure and feel like I have a sinus infection.\"    "

## 2024-01-18 NOTE — Clinical Note
Vickie Stock was seen and treated in our emergency department on 1/18/2024.                Diagnosis:     Vickie  .    She may return on this date: 01/20/2024         If you have any questions or concerns, please don't hesitate to call.      Cruz Durant, DO    ______________________________           _______________          _______________  Hospital Representative                              Date                                Time

## 2024-01-18 NOTE — ED PROVIDER NOTES
History  Chief Complaint   Patient presents with    Shortness of Breath     Patient reports SOB that started when she was dx with covid 1/7 which has still not gone away. Patient also reports severe facial pressure and pain that travels up neck and into back of head.    Headache       Shortness of Breath  Associated symptoms: headaches    Associated symptoms: no abdominal pain, no chest pain, no cough, no ear pain, no fever, no rash, no sore throat and no vomiting    Headache  Associated symptoms: no abdominal pain, no back pain, no cough, no ear pain, no eye pain, no fever, no seizures, no sore throat and no vomiting        28-year-old female, past medical history below, presenting 2 weeks after symptom onset of COVID with subsequent resolution of most symptoms but continuation of facial pressure and generalized headache.  Patient denies fever, chills, or any other symptom per ROS.  In regards to the triage note of shortness of breath, patient notes that something that is intermittently occurred times years and is unchanged from her baseline.  She states this has been evaluated on previous occasions.    Prior to Admission Medications   Prescriptions Last Dose Informant Patient Reported? Taking?   Ferrous Sulfate (IRON PO)  Self Yes No   Sig: Take 1 tablet every day by oral route.   ergocalciferol (ERGOCALCIFEROL) 1.25 MG (76084 UT) capsule   No No   Sig: Take 1 capsule (50,000 Units total) by mouth once a week   ketoconazole (NIZORAL) 2 % shampoo  Self Yes No   Sig: as needed   mesalamine (PENTASA) 500 mg CR capsule   No No   Sig: Take 2 capsules (1,000 mg total) by mouth 4 (four) times a day   Patient not taking: Reported on 10/17/2023   neomycin-polymyxin-dexamethasone (MAXITROL) ophthalmic suspension  Self Yes No   Patient not taking: Reported on 12/27/2023   pantoprazole (PROTONIX) 40 mg tablet   No No   Sig: Take 1 tablet (40 mg total) by mouth daily   predniSONE 5 mg tablet   No No   Sig: Take 8 tablets (40  mg total) by mouth daily for 7 days, THEN 7 tablets (35 mg total) daily for 7 days, THEN 6 tablets (30 mg total) daily for 7 days, THEN 5 tablets (25 mg total) daily for 7 days, THEN 4 tablets (20 mg total) daily for 7 days, THEN 3 tablets (15 mg total) daily for 7 days, THEN 2 tablets (10 mg total) daily for 7 days, THEN 1 tablet (5 mg total) daily for 7 days.      Facility-Administered Medications: None       Past Medical History:   Diagnosis Date    Chronic back pain     Colitis     History of transfusion     Multiple abrasions 2013    Proteinuria     Urinary tract infection     pylonephritis       Past Surgical History:   Procedure Laterality Date    COLONOSCOPY      DILATION AND CURETTAGE OF UTERUS N/A 08/23/2021    Procedure: DILATATION AND CURETTAGE (D&C);  Surgeon: Rona Puga MD;  [facility];  Service: Obstetrics    EXAMINATION UNDER ANESTHESIA N/A 08/23/2021    Procedure: EXAM UNDER ANESTHESIA (EUA) WITH INSERTION OF BAKRI BALLOON;  Surgeon: Rona Puga MD;  [facility]: [facility];  Service: Obstetrics    TONSILLECTOMY  2009    UPPER GASTROINTESTINAL ENDOSCOPY      WISDOM TOOTH EXTRACTION         Family History   Problem Relation Age of Onset    Hypertension Mother     Arthritis Mother     Depression Mother     COPD Mother     Gestational diabetes Mother     Lung cancer Paternal Grandmother     Cancer Paternal Grandmother     COPD Father     Depression Sister     Anxiety disorder Sister     Depression Brother     Bipolar disorder Brother     Depression Brother     No Known Problems Maternal Grandmother     COPD Maternal Grandfather     Cancer Maternal Grandfather     Heart disease Paternal Grandfather     Depression Brother      I have reviewed and agree with the history as documented.    E-Cigarette/Vaping    E-Cigarette Use Current Every Day User      E-Cigarette/Vaping Substances    Nicotine No     THC No     CBD No     Flavoring No      Social History     Tobacco Use    Smoking  status: Former     Current packs/day: 0.00     Types: Cigarettes     Quit date: 12/1/2020     Years since quitting: 3.1    Smokeless tobacco: Never    Tobacco comments:     use Vape   Vaping Use    Vaping status: Every Day   Substance Use Topics    Alcohol use: Not Currently    Drug use: Not Currently     Types: Marijuana     Comment: hasn't used since before pregnancy       Review of Systems   Constitutional:  Negative for chills and fever.   HENT:  Negative for ear pain and sore throat.    Eyes:  Negative for pain and visual disturbance.   Respiratory:  Negative for cough and shortness of breath.    Cardiovascular:  Negative for chest pain and palpitations.   Gastrointestinal:  Negative for abdominal pain and vomiting.   Genitourinary:  Negative for dysuria and hematuria.   Musculoskeletal:  Negative for arthralgias and back pain.   Skin:  Negative for color change and rash.   Neurological:  Positive for headaches. Negative for seizures and syncope.   All other systems reviewed and are negative.      Physical Exam  Physical Exam  Vitals and nursing note reviewed.   Constitutional:       General: She is not in acute distress.     Appearance: She is well-developed.   HENT:      Head: Normocephalic and atraumatic.   Eyes:      Conjunctiva/sclera: Conjunctivae normal.   Cardiovascular:      Rate and Rhythm: Normal rate and regular rhythm.      Heart sounds: No murmur heard.  Pulmonary:      Effort: Pulmonary effort is normal. No respiratory distress.      Breath sounds: Normal breath sounds.   Abdominal:      Palpations: Abdomen is soft.      Tenderness: There is no abdominal tenderness.   Musculoskeletal:         General: No swelling.      Cervical back: Neck supple.   Skin:     General: Skin is warm and dry.      Capillary Refill: Capillary refill takes less than 2 seconds.   Neurological:      Mental Status: She is alert.      Comments: AAOX4. CN intact. Gross vision intact all 4 quadrants. Cerebellar signs with  "finger to nose and heel to shin intact. All extremities 5/5 strength. Gross sensation appreciated face and extremities.      Psychiatric:         Mood and Affect: Mood normal.         Vital Signs  ED Triage Vitals   Temperature Pulse Respirations Blood Pressure SpO2   01/18/24 0727 01/18/24 0727 01/18/24 0727 01/18/24 0727 01/18/24 0727   97.6 °F (36.4 °C) 91 18 136/91 96 %      Temp Source Heart Rate Source Patient Position - Orthostatic VS BP Location FiO2 (%)   01/18/24 0727 01/18/24 0727 01/18/24 0727 01/18/24 0727 --   Oral Monitor Sitting Right arm       Pain Score       01/18/24 0813       9           Vitals:    01/18/24 0727   BP: 136/91   Pulse: 91   Patient Position - Orthostatic VS: Sitting         Visual Acuity      ED Medications  Medications   ibuprofen (MOTRIN) tablet 400 mg (400 mg Oral Given 1/18/24 0813)   amoxicillin-clavulanate (AUGMENTIN) 875-125 mg per tablet 1 tablet (1 tablet Oral Given 1/18/24 0814)       Diagnostic Studies  Results Reviewed       None                   No orders to display              Procedures  Procedures         ED Course                                             Medical Decision Making  28-year-old female presenting with \"double sickening\" in terms of her facial pressure likely indicative of the possibility of bacterial sinusitis status post viral syndrome.  No systemic signs or symptoms to suggest lab work at this time.  Antibiotics provided here as well as course of the same.  NSAIDs provided here as well as recommendation to continue with that same.  Pregnancy testing offered given NSAID use and this was explained to the patient and she ultimately declined. Reviewed all findings both relevant and incidental with the patient at bedside. Pt verbalized understanding of findings, neccesary follow up, return to ED precautions. Pt agreed to review today's findings with their primary care provider. Pt non-toxic appearing upon discharge.       Problems Addressed:  Acute " nonintractable headache, unspecified headache type: acute illness or injury  Sinusitis: acute illness or injury    Amount and/or Complexity of Data Reviewed  Independent Historian: spouse  Labs: ordered. Decision-making details documented in ED Course.    Risk  OTC drugs.  Prescription drug management.             Disposition  Final diagnoses:   Sinusitis   Acute nonintractable headache, unspecified headache type     Time reflects when diagnosis was documented in both MDM as applicable and the Disposition within this note       Time User Action Codes Description Comment    1/18/2024  8:00 AM Cruz Durant [J32.9] Sinusitis     1/18/2024  8:01 AM Cruz Durant Add [R51.9] Acute nonintractable headache, unspecified headache type           ED Disposition       ED Disposition   Discharge    Condition   Stable    Date/Time   Thu Jan 18, 2024  8:00 AM    Comment   Vickie Stock discharge to home/self care.                   Follow-up Information       Follow up With Specialties Details Why Contact Info    Ashley Butler MD Family Medicine Call  As needed [phone] [phone]  [phone]              Discharge Medication List as of 1/18/2024  8:02 AM        START taking these medications    Details   amoxicillin-clavulanate (AUGMENTIN) 875-125 mg per tablet Take 1 tablet by mouth every 12 (twelve) hours for 5 days, Starting Thu 1/18/2024, Until Tue 1/23/2024, Normal           CONTINUE these medications which have NOT CHANGED    Details   ergocalciferol (ERGOCALCIFEROL) 1.25 MG (19077 UT) capsule Take 1 capsule (50,000 Units total) by mouth once a week, Starting Tue 1/9/2024, Normal      Ferrous Sulfate (IRON PO) Take 1 tablet every day by oral route., Historical Med      ketoconazole (NIZORAL) 2 % shampoo as needed, Starting Fri 6/2/2023, Historical Med      mesalamine (PENTASA) 500 mg CR capsule Take 2 capsules (1,000 mg total) by mouth 4 (four) times a day, Starting Wed 9/13/2023,  Until Sun 11/12/2023, Normal      neomycin-polymyxin-dexamethasone (MAXITROL) ophthalmic suspension Historical Med      pantoprazole (PROTONIX) 40 mg tablet Take 1 tablet (40 mg total) by mouth daily, Starting Wed 12/27/2023, Until Tue 3/26/2024, Normal      predniSONE 5 mg tablet Multiple Dosages:Starting Wed 12/27/2023, Until Tue 1/2/2024, THEN Starting Wed 1/3/2024, Until Tue 1/9/2024, THEN Starting Wed 1/10/2024, Until Tue 1/16/2024, THEN Starting Wed 1/17/2024, Until Tue 1/23/2024, THEN Starting Wed 1/24/2024, Until Tue 1/30/2024, THEN Starting Wed 1/31/2024, Until Tue 2/6/2024, THEN Starting Wed 2/7/2024, Until Tue 2/13/2024, THEN Starting Wed 2/14/2024, Until Tue 2/20/2024Take 8 tablets (40 mg total) by mouth daily for 7 days, THEN 7 tablets (35 mg total) daily fo r 7 days, THEN 6 tablets (30 mg total) daily for 7 days, THEN 5 tablets (25 mg total) daily for 7 days, THEN 4 tablets (20 mg total) daily for 7 days, THEN 3 tablets (15 mg total) daily for 7 days, THEN 2 tablets (10 mg total) daily for 7 days, THEN 1 ta blet (5 mg total) daily for 7 days., Normal             No discharge procedures on file.    PDMP Review         Value Time User    PDMP Reviewed  Yes 8/7/2022  6:02 PM Cadence Ortez DO            ED Provider  Electronically Signed by             Cruz Durant DO  01/18/24 [id number]

## 2024-01-24 ENCOUNTER — TELEPHONE (OUTPATIENT)
Dept: GASTROENTEROLOGY | Facility: CLINIC | Age: 29
End: 2024-01-24

## 2024-01-24 NOTE — TELEPHONE ENCOUNTER
Sent request to [facility] directly as I do not know where these papers are located and I see nothing in pt's chart that it was scanned in after it was filled out and faxed.

## 2024-01-30 DIAGNOSIS — K50.019 CROHN'S DISEASE OF SMALL INTESTINE WITH COMPLICATION (HCC): ICD-10-CM

## 2024-01-30 RX ORDER — PREDNISONE 5 MG/1
TABLET ORAL
Qty: 252 TABLET | Refills: 0 | OUTPATIENT
Start: 2024-01-30

## 2024-01-31 ENCOUNTER — NURSE TRIAGE (OUTPATIENT)
Age: 29
End: 2024-01-31

## 2024-01-31 NOTE — TELEPHONE ENCOUNTER
"Pt called to check on status of infusion. States she was informed prednisone refill denied and questioning next steps.    Pt can be reached at [phone]. Ok to leave detailed message or communicate via [employer] if unable to reach.    Reason for Disposition  • Nursing judgment    Answer Assessment - Initial Assessment Questions  1. REASON FOR CALL or QUESTION: \"What is your reason for calling today?\" or \"How can I best help you?\" or \"What question do you have that I can help answer?\"      Pt called to check on status of prednisone refill and Infliximab infusion.    Protocols used: Information Only Call - No Triage-ADULT-[place]    "

## 2024-02-02 DIAGNOSIS — K50.019 CROHN'S DISEASE OF SMALL INTESTINE WITH COMPLICATION (HCC): Primary | ICD-10-CM

## 2024-02-02 RX ORDER — DIPHENHYDRAMINE HCL 25 MG
25 TABLET ORAL ONCE
OUTPATIENT
Start: 2024-02-05

## 2024-02-02 RX ORDER — SODIUM CHLORIDE 9 MG/ML
20 INJECTION, SOLUTION INTRAVENOUS ONCE
OUTPATIENT
Start: 2024-02-05

## 2024-02-02 RX ORDER — ACETAMINOPHEN 325 MG/1
650 TABLET ORAL ONCE
OUTPATIENT
Start: 2024-02-05

## 2024-02-02 RX ORDER — METHYLPREDNISOLONE SODIUM SUCCINATE 40 MG/ML
40 INJECTION, POWDER, LYOPHILIZED, FOR SOLUTION INTRAMUSCULAR; INTRAVENOUS ONCE
OUTPATIENT
Start: 2024-02-05

## 2024-02-12 ENCOUNTER — NURSE TRIAGE (OUTPATIENT)
Age: 29
End: 2024-02-12

## 2024-02-12 NOTE — TELEPHONE ENCOUNTER
"Answer Assessment - Initial Assessment Questions  1. REASON FOR CALL or QUESTION: \"What is your reason for calling today?\" or \"How can I best help you?\" or \"What question do you have that I can help answer?\"      Pt called about paperwork needing to be filled out by our office. She explained that paper work needs to be updated and she wondered if it would be best to come into the office.     I explained for the convenience of pt and provider, it would be best to send in paperwork through [facility]    Protocols used: Information Only Call - No Triage-ADULT-[place]    "

## 2024-02-12 NOTE — TELEPHONE ENCOUNTER
"  Azam calling in from [facility] asking to speak with Sera in regards to pts infusion. He would like a call back at [phone].    Reason for Disposition   Information only question and nurse able to answer    Answer Assessment - Initial Assessment Questions  1. REASON FOR CALL or QUESTION: \"What is your reason for calling today?\" or \"How can I best help you?\" or \"What question do you have that I can help answer?\"      Asking to speak with Sera    Protocols used: Information Only Call - No Triage-ADULT-[place]    "

## 2024-02-14 NOTE — TELEPHONE ENCOUNTER
2/14  Pablito Johnston,  I actually spoke with the  Azam and he is going to work on extending the approval to cover your third loading dose on March 29, 2024.  So this way all 3 loading doses will be at the hospital after that we will have to send orders to [facility] for the maintenance dosing.  But at least your 3 appointments will be covered for the loading dosing.  If you could just let me know if you would prefer [facility] or [facility] and I can take care of the rest.      Sera Leblanc  Prior Authorization Specialist   [phone]

## 2024-02-14 NOTE — TELEPHONE ENCOUNTER
2/14 spoke with Azam from [facility] explained that the patient is just starting her infusions on 2/16, he is going to work on extending her authorization through her third loading dose which would be 3/29.  And then we can send orders over for her to have infusions with [facility] care.

## 2024-02-16 ENCOUNTER — HOSPITAL ENCOUNTER (OUTPATIENT)
Dept: INFUSION CENTER | Facility: CLINIC | Age: 29
End: 2024-02-16
Payer: COMMERCIAL

## 2024-02-16 VITALS
HEART RATE: 86 BPM | TEMPERATURE: 97.6 F | SYSTOLIC BLOOD PRESSURE: 111 MMHG | RESPIRATION RATE: 18 BRPM | OXYGEN SATURATION: 98 % | BODY MASS INDEX: 40.14 KG/M2 | WEIGHT: 248.68 LBS | DIASTOLIC BLOOD PRESSURE: 77 MMHG

## 2024-02-16 DIAGNOSIS — K50.019 CROHN'S DISEASE OF SMALL INTESTINE WITH COMPLICATION (HCC): Primary | ICD-10-CM

## 2024-02-16 PROCEDURE — 96413 CHEMO IV INFUSION 1 HR: CPT

## 2024-02-16 PROCEDURE — 96415 CHEMO IV INFUSION ADDL HR: CPT

## 2024-02-16 PROCEDURE — 96375 TX/PRO/DX INJ NEW DRUG ADDON: CPT

## 2024-02-16 RX ORDER — SODIUM CHLORIDE 9 MG/ML
20 INJECTION, SOLUTION INTRAVENOUS ONCE
Status: CANCELLED | OUTPATIENT
Start: 2024-03-01

## 2024-02-16 RX ORDER — SODIUM CHLORIDE 9 MG/ML
20 INJECTION, SOLUTION INTRAVENOUS ONCE
Status: COMPLETED | OUTPATIENT
Start: 2024-02-16 | End: 2024-02-16

## 2024-02-16 RX ORDER — DIPHENHYDRAMINE HCL 25 MG
25 TABLET ORAL ONCE
OUTPATIENT
Start: 2024-03-01

## 2024-02-16 RX ORDER — ACETAMINOPHEN 325 MG/1
650 TABLET ORAL ONCE
Status: COMPLETED | OUTPATIENT
Start: 2024-02-16 | End: 2024-02-16

## 2024-02-16 RX ORDER — METHYLPREDNISOLONE SODIUM SUCCINATE 40 MG/ML
40 INJECTION, POWDER, LYOPHILIZED, FOR SOLUTION INTRAMUSCULAR; INTRAVENOUS ONCE
OUTPATIENT
Start: 2024-03-01

## 2024-02-16 RX ORDER — ACETAMINOPHEN 325 MG/1
650 TABLET ORAL ONCE
Status: CANCELLED | OUTPATIENT
Start: 2024-03-01

## 2024-02-16 RX ORDER — SODIUM CHLORIDE 9 MG/ML
20 INJECTION, SOLUTION INTRAVENOUS ONCE
OUTPATIENT
Start: 2024-03-01

## 2024-02-16 RX ORDER — DIPHENHYDRAMINE HCL 25 MG
25 TABLET ORAL ONCE
Status: CANCELLED | OUTPATIENT
Start: 2024-03-01

## 2024-02-16 RX ORDER — ACETAMINOPHEN 325 MG/1
650 TABLET ORAL ONCE
OUTPATIENT
Start: 2024-03-01

## 2024-02-16 RX ORDER — METHYLPREDNISOLONE SODIUM SUCCINATE 40 MG/ML
40 INJECTION, POWDER, LYOPHILIZED, FOR SOLUTION INTRAMUSCULAR; INTRAVENOUS ONCE
Status: COMPLETED | OUTPATIENT
Start: 2024-02-16 | End: 2024-02-16

## 2024-02-16 RX ORDER — DIPHENHYDRAMINE HCL 25 MG
25 TABLET ORAL ONCE
Status: COMPLETED | OUTPATIENT
Start: 2024-02-16 | End: 2024-02-16

## 2024-02-16 RX ORDER — METHYLPREDNISOLONE SODIUM SUCCINATE 40 MG/ML
40 INJECTION, POWDER, LYOPHILIZED, FOR SOLUTION INTRAMUSCULAR; INTRAVENOUS ONCE
Status: CANCELLED | OUTPATIENT
Start: 2024-03-01

## 2024-02-16 RX ADMIN — SODIUM CHLORIDE 565 MG: 0.9 INJECTION, SOLUTION INTRAVENOUS at 09:58

## 2024-02-16 RX ADMIN — ACETAMINOPHEN 650 MG: 325 TABLET ORAL at 08:47

## 2024-02-16 RX ADMIN — METHYLPREDNISOLONE SODIUM SUCCINATE 40 MG: 40 INJECTION, POWDER, FOR SOLUTION INTRAMUSCULAR; INTRAVENOUS at 08:47

## 2024-02-16 RX ADMIN — SODIUM CHLORIDE 20 ML/HR: 0.9 INJECTION, SOLUTION INTRAVENOUS at 08:43

## 2024-02-16 RX ADMIN — DIPHENHYDRAMINE HYDROCHLORIDE 25 MG: 25 TABLET ORAL at 08:47

## 2024-02-16 NOTE — PROGRESS NOTES
Pt. offers no complaints, here for Inflectra infusion, denies s/s of infection and antibiotic use.

## 2024-02-16 NOTE — PROGRESS NOTES
Pt. tolerated treatment without incident, AVS declined.  Next appt. confirmed for 3/1/24 @ 10:00.

## 2024-02-29 ENCOUNTER — TELEPHONE (OUTPATIENT)
Age: 29
End: 2024-02-29

## 2024-02-29 NOTE — TELEPHONE ENCOUNTER
Patients GI provider:  Dr. Lechuga    Number to return call: [phone]    Reason for call: Ruthie Henry from [facility] calling for clarification on medical accomodation formasap. Pt requested working from home 3 days a week and working up to 2 days from in office as needed.   Fax [phone]    Scheduled procedure/appointment date if applicable: Appt. 3/6/24

## 2024-03-01 ENCOUNTER — HOSPITAL ENCOUNTER (OUTPATIENT)
Dept: INFUSION CENTER | Facility: CLINIC | Age: 29
End: 2024-03-01
Payer: COMMERCIAL

## 2024-03-01 VITALS
TEMPERATURE: 97.2 F | DIASTOLIC BLOOD PRESSURE: 84 MMHG | SYSTOLIC BLOOD PRESSURE: 118 MMHG | OXYGEN SATURATION: 98 % | HEART RATE: 85 BPM | RESPIRATION RATE: 18 BRPM

## 2024-03-01 DIAGNOSIS — K50.019 CROHN'S DISEASE OF SMALL INTESTINE WITH COMPLICATION (HCC): Primary | ICD-10-CM

## 2024-03-01 PROCEDURE — 96375 TX/PRO/DX INJ NEW DRUG ADDON: CPT

## 2024-03-01 PROCEDURE — 96413 CHEMO IV INFUSION 1 HR: CPT

## 2024-03-01 PROCEDURE — 96415 CHEMO IV INFUSION ADDL HR: CPT

## 2024-03-01 RX ORDER — METHYLPREDNISOLONE SODIUM SUCCINATE 40 MG/ML
40 INJECTION, POWDER, LYOPHILIZED, FOR SOLUTION INTRAMUSCULAR; INTRAVENOUS ONCE
Status: COMPLETED | OUTPATIENT
Start: 2024-03-01 | End: 2024-03-01

## 2024-03-01 RX ORDER — DIPHENHYDRAMINE HCL 25 MG
25 TABLET ORAL ONCE
Status: COMPLETED | OUTPATIENT
Start: 2024-03-01 | End: 2024-03-01

## 2024-03-01 RX ORDER — SODIUM CHLORIDE 9 MG/ML
20 INJECTION, SOLUTION INTRAVENOUS ONCE
Status: CANCELLED | OUTPATIENT
Start: 2024-03-29

## 2024-03-01 RX ORDER — ACETAMINOPHEN 325 MG/1
650 TABLET ORAL ONCE
OUTPATIENT
Start: 2024-03-29

## 2024-03-01 RX ORDER — ACETAMINOPHEN 325 MG/1
650 TABLET ORAL ONCE
Status: CANCELLED | OUTPATIENT
Start: 2024-03-29

## 2024-03-01 RX ORDER — DIPHENHYDRAMINE HCL 25 MG
25 TABLET ORAL ONCE
OUTPATIENT
Start: 2024-03-29

## 2024-03-01 RX ORDER — SODIUM CHLORIDE 9 MG/ML
20 INJECTION, SOLUTION INTRAVENOUS ONCE
Status: COMPLETED | OUTPATIENT
Start: 2024-03-01 | End: 2024-03-01

## 2024-03-01 RX ORDER — ACETAMINOPHEN 325 MG/1
650 TABLET ORAL ONCE
Status: COMPLETED | OUTPATIENT
Start: 2024-03-01 | End: 2024-03-01

## 2024-03-01 RX ORDER — METHYLPREDNISOLONE SODIUM SUCCINATE 40 MG/ML
40 INJECTION, POWDER, LYOPHILIZED, FOR SOLUTION INTRAMUSCULAR; INTRAVENOUS ONCE
Status: CANCELLED | OUTPATIENT
Start: 2024-03-29

## 2024-03-01 RX ORDER — METHYLPREDNISOLONE SODIUM SUCCINATE 40 MG/ML
40 INJECTION, POWDER, LYOPHILIZED, FOR SOLUTION INTRAMUSCULAR; INTRAVENOUS ONCE
OUTPATIENT
Start: 2024-03-29

## 2024-03-01 RX ORDER — SODIUM CHLORIDE 9 MG/ML
20 INJECTION, SOLUTION INTRAVENOUS ONCE
OUTPATIENT
Start: 2024-03-29

## 2024-03-01 RX ORDER — DIPHENHYDRAMINE HCL 25 MG
25 TABLET ORAL ONCE
Status: CANCELLED | OUTPATIENT
Start: 2024-03-29

## 2024-03-01 RX ADMIN — SODIUM CHLORIDE 565 MG: 0.9 INJECTION, SOLUTION INTRAVENOUS at 11:03

## 2024-03-01 RX ADMIN — METHYLPREDNISOLONE SODIUM SUCCINATE 40 MG: 40 INJECTION, POWDER, FOR SOLUTION INTRAMUSCULAR; INTRAVENOUS at 10:10

## 2024-03-01 RX ADMIN — DIPHENHYDRAMINE HYDROCHLORIDE 25 MG: 25 TABLET ORAL at 10:11

## 2024-03-01 RX ADMIN — SODIUM CHLORIDE 20 ML/HR: 0.9 INJECTION, SOLUTION INTRAVENOUS at 10:58

## 2024-03-01 RX ADMIN — ACETAMINOPHEN 650 MG: 325 TABLET ORAL at 10:10

## 2024-03-01 NOTE — PROGRESS NOTES
Patient to infusion for for inflectra.  She offers no complaints.  She denies any recent illness, infections or antibiotic use.  She tolerated treatment today. Next appointment 3/29 830am, she declined AVS

## 2024-03-04 NOTE — TELEPHONE ENCOUNTER
Ruthie calling again to speak with a Dr about pt's accommodation paper work. Please call Ruthie back at [phone]

## 2024-03-06 ENCOUNTER — OFFICE VISIT (OUTPATIENT)
Dept: GASTROENTEROLOGY | Facility: CLINIC | Age: 29
End: 2024-03-06
Payer: COMMERCIAL

## 2024-03-06 VITALS
HEIGHT: 66 IN | BODY MASS INDEX: 40.37 KG/M2 | WEIGHT: 251.2 LBS | DIASTOLIC BLOOD PRESSURE: 85 MMHG | HEART RATE: 92 BPM | SYSTOLIC BLOOD PRESSURE: 114 MMHG

## 2024-03-06 DIAGNOSIS — K27.9 PUD (PEPTIC ULCER DISEASE): ICD-10-CM

## 2024-03-06 DIAGNOSIS — K50.919 CROHN'S DISEASE WITH COMPLICATION, UNSPECIFIED GASTROINTESTINAL TRACT LOCATION (HCC): Primary | ICD-10-CM

## 2024-03-06 PROCEDURE — 99214 OFFICE O/P EST MOD 30 MIN: CPT | Performed by: INTERNAL MEDICINE

## 2024-03-06 NOTE — PROGRESS NOTES
[facility]  Progress Note - Vickie Stock 28 y.o. female MRN: [id number]    Unit/Bed#:  Encounter: [id number]    Assessment/Plan:      1.  Crohn's disease involving the small intestine: Resulting in stricturing disease involving distal portion of TI (15 to 20 cm of the TI based on MRE), stricturing of ileocecal valve noted on colonoscopy 2023: Initially started on prednisone taper, currently on infliximab induction dosing.  Appears to be in clinical remission.  Recommend checking fecal calprotectin and C-reactive protein.  Check CBC and CMP.  Continue with infliximab induction dose followed by maintenance.  Continue to avoid NSAIDs.  Patient is not a smoker.  Will place referral to dermatology for annual surveillance.  Denies any joint pains or concerning rashes.  Avoid live vaccines.  Recommend flu vaccine once available.  Continue follow-up with with GYN annually.    2.  Duodenal ulcers:  -Continue pantoprazole 40 mg at this time.  Avoid NSAIDs.    3.  Will plan for MR E and colonoscopy in 6 to 9 months.    4.  Patient would like to work from home on as-needed basis especially during flares and during infusions.  Paperwork filled out yesterday by [employer].  Patient reports that she does not need anything additional.    Subjective:   28 female with history of iron deficiency anemia, newly diagnosed Crohn's ileitis with stricturing disease notable on the MRE involving 15 to 20 cm distal portion of the TI, currently on induction dose of infliximab presents for follow-up.  Patient reports that she completed her steroid taper that was started in December.  She received her second dose of induction recently.  She reports that she is currently having 1 nonbloody bowel movements on most days.  She denies any urgency or tenesmus.  Patient reports that she was having flare like symptoms few weeks ago prior to her last infusion.  She describes this as 4-5 loose bowel movements.  She denies  "abdominal pain, nausea or vomiting.  She is currently on pantoprazole forduodenal ulcers which were found on an endoscopy in September.    Objective:     Vitals: Blood pressure 114/85, pulse 92, height 5' 6\" (1.676 m), weight 114 kg (251 lb 3.2 oz), not currently breastfeeding.,Body mass index is 40.54 kg/m².    [unfilled]    Physical Exam:    GEN: wn/wd, NAD  HEENT: MMM, no cervical or supraclavicular LAD, anciteric  CV: RRR, no m/r/g  CHEST: CTA b/l, no w/r/r  ABD: +BS, soft, NT/ND, no hepatosplenomegaly  EXT: no c/c/e  SKIN: no rashes  NEURO: aaox3      Invasive Devices       Drain  Duration             Intrauterine postpartum balloon 08/23/21 1903 400 mL 925 days                            Lab, Imaging and other studies:     No visits with results within 1 Day(s) from this visit.   Latest known visit with results is:   Appointment on 01/08/2024   Component Date Value    Sodium 01/08/2024 133 (L)     Potassium 01/08/2024 4.2     Chloride 01/08/2024 101     CO2 01/08/2024 23     ANION GAP 01/08/2024 9     BUN 01/08/2024 19     Creatinine 01/08/2024 0.81     Glucose 01/08/2024 98     Calcium 01/08/2024 9.1     AST 01/08/2024 10 (L)     ALT 01/08/2024 15     Alkaline Phosphatase 01/08/2024 106 (H)     Total Protein 01/08/2024 7.3     Albumin 01/08/2024 4.3     Total Bilirubin 01/08/2024 0.26     eGFR 01/08/2024 100     WBC 01/08/2024 13.23 (H)     RBC 01/08/2024 5.27 (H)     Hemoglobin 01/08/2024 13.7     Hematocrit 01/08/2024 43.2     MCV 01/08/2024 82     MCH 01/08/2024 26.0 (L)     MCHC 01/08/2024 31.7     RDW 01/08/2024 14.6     Platelets 01/08/2024 292     MPV 01/08/2024 11.6     CRP 01/08/2024 14.0 (H)     Vit D, 25-Hydroxy 01/08/2024 12.4 (L)          I have personally reviewed pertinent films in PACS    No current facility-administered medications for this visit.           3  "

## 2024-03-13 ENCOUNTER — OFFICE VISIT (OUTPATIENT)
Dept: FAMILY MEDICINE CLINIC | Facility: CLINIC | Age: 29
End: 2024-03-13
Payer: COMMERCIAL

## 2024-03-13 VITALS
TEMPERATURE: 98.7 F | RESPIRATION RATE: 18 BRPM | HEIGHT: 66 IN | SYSTOLIC BLOOD PRESSURE: 100 MMHG | OXYGEN SATURATION: 98 % | BODY MASS INDEX: 40.88 KG/M2 | HEART RATE: 96 BPM | DIASTOLIC BLOOD PRESSURE: 68 MMHG | WEIGHT: 254.4 LBS

## 2024-03-13 DIAGNOSIS — E66.01 CLASS 3 SEVERE OBESITY DUE TO EXCESS CALORIES WITH SERIOUS COMORBIDITY AND BODY MASS INDEX (BMI) OF 40.0 TO 44.9 IN ADULT (HCC): Primary | ICD-10-CM

## 2024-03-13 DIAGNOSIS — D50.8 IRON DEFICIENCY ANEMIA SECONDARY TO INADEQUATE DIETARY IRON INTAKE: ICD-10-CM

## 2024-03-13 DIAGNOSIS — K27.9 PUD (PEPTIC ULCER DISEASE): ICD-10-CM

## 2024-03-13 DIAGNOSIS — K50.019 CROHN'S DISEASE OF SMALL INTESTINE WITH COMPLICATION (HCC): ICD-10-CM

## 2024-03-13 PROBLEM — M35.7 HYPERMOBILITY SYNDROME: Status: ACTIVE | Noted: 2023-01-18

## 2024-03-13 PROCEDURE — 99214 OFFICE O/P EST MOD 30 MIN: CPT | Performed by: FAMILY MEDICINE

## 2024-03-13 RX ORDER — PHENTERMINE HYDROCHLORIDE 30 MG/1
30 CAPSULE ORAL EVERY MORNING
Qty: 30 CAPSULE | Refills: 1 | Status: SHIPPED | OUTPATIENT
Start: 2024-03-13

## 2024-03-13 RX ORDER — TOPIRAMATE 25 MG/1
TABLET ORAL DAILY
Qty: 53 TABLET | Refills: 0 | Status: SHIPPED | OUTPATIENT
Start: 2024-03-13 | End: 2024-04-12

## 2024-03-13 NOTE — PROGRESS NOTES
Subjective:      Patient ID: Vickie Stock is a 28 y.o. female.    With Crohn's disease on Inflixamab infusions every 8 weeks, gets solumedrol during the infusions present to discuss weight loss  She has been heavy all her life  Tried lifestyle changes for past 3 months without benefit  Previously never been on medications  Has considered surgery and would consider in future if not successful on weight loss medications  Goal weight 160 lbs, today is 254 lbs        Past Medical History:   Diagnosis Date    Chronic back pain     Colitis     History of transfusion     Multiple abrasions 2013    Proteinuria     Shoulder disorder     Urinary tract infection     pylonephritis       Family History   Problem Relation Age of Onset    Hypertension Mother     Arthritis Mother     Depression Mother     COPD Mother     Gestational diabetes Mother     Lung cancer Paternal Grandmother     Cancer Paternal Grandmother     COPD Father     Depression Sister     Anxiety disorder Sister     Depression Brother     Bipolar disorder Brother     Depression Brother     No Known Problems Maternal Grandmother     COPD Maternal Grandfather     Cancer Maternal Grandfather     Heart disease Paternal Grandfather     Depression Brother        Past Surgical History:   Procedure Laterality Date    COLONOSCOPY      DILATION AND CURETTAGE OF UTERUS N/A 08/23/2021    Procedure: DILATATION AND CURETTAGE (D&C);  Surgeon: Rona Puga MD;  [facility];  Service: Obstetrics    EXAMINATION UNDER ANESTHESIA N/A 08/23/2021    Procedure: EXAM UNDER ANESTHESIA (EUA) WITH INSERTION OF BAKRI BALLOON;  Surgeon: Rona Puga MD;  [facility];  Service: Obstetrics    TONSILLECTOMY  2009    UPPER GASTROINTESTINAL ENDOSCOPY      WISDOM TOOTH EXTRACTION          reports that she quit smoking about 3 years ago. Her smoking use included cigarettes. She has never used smokeless tobacco. She reports that she does not currently use alcohol.  She reports that she does not currently use drugs after having used the following drugs: Marijuana.      Current Outpatient Medications:     ergocalciferol (ERGOCALCIFEROL) 1.25 MG (70646 UT) capsule, Take 1 capsule (50,000 Units total) by mouth once a week, Disp: 8 capsule, Rfl: 0    Ferrous Sulfate (IRON PO), Take 1 tablet every day by oral route., Disp: , Rfl:     ketoconazole (NIZORAL) 2 % shampoo, as needed, Disp: , Rfl:     pantoprazole (PROTONIX) 40 mg tablet, Take 1 tablet (40 mg total) by mouth daily, Disp: 90 tablet, Rfl: 0    phentermine 30 MG capsule, Take 1 capsule (30 mg total) by mouth every morning, Disp: 30 capsule, Rfl: 1    topiramate (Topamax) 25 mg tablet, Take 1 tablet (25 mg total) by mouth daily for 7 days, THEN 1 tablet (25 mg total) 2 (two) times a day for 23 days., Disp: 53 tablet, Rfl: 0    The following portions of the patient's history were reviewed and updated as appropriate: allergies, current medications, past family history, past medical history, past social history, past surgical history and problem list.    Review of Systems   Constitutional:  Negative for fatigue and fever.   HENT:  Negative for congestion, facial swelling, mouth sores, rhinorrhea, sore throat and trouble swallowing.    Eyes:  Negative for pain and redness.   Respiratory:  Negative for cough, shortness of breath and wheezing.    Cardiovascular:  Negative for chest pain, palpitations and leg swelling.   Gastrointestinal:  Negative for abdominal pain, blood in stool, constipation, diarrhea and nausea.   Genitourinary:  Negative for dysuria, hematuria and urgency.   Musculoskeletal:  Negative for arthralgias, back pain and myalgias.   Skin:  Negative for rash and wound.   Neurological:  Negative for seizures, syncope and headaches.   Hematological:  Negative for adenopathy.   Psychiatric/Behavioral:  Negative for agitation and behavioral problems.          PHQ-2/9 Depression Screening    Little interest or pleasure  "in doing things: 0 - not at all  Feeling down, depressed, or hopeless: 0 - not at all  Trouble falling or staying asleep, or sleeping too much: 0 - not at all  Feeling tired or having little energy: 0 - not at all  Poor appetite or overeatin - not at all  Feeling bad about yourself - or that you are a failure or have let yourself or your family down: 0 - not at all  Trouble concentrating on things, such as reading the newspaper or watching television: 0 - not at all  Moving or speaking so slowly that other people could have noticed. Or the opposite - being so fidgety or restless that you have been moving around a lot more than usual: 0 - not at all  Thoughts that you would be better off dead, or of hurting yourself in some way: 0 - not at all  PHQ-9 Score: 0  PHQ-9 Interpretation: No or Minimal depression             Objective:    /68 (BP Location: Left arm, Patient Position: Sitting, Cuff Size: Large)   Pulse 96   Temp 98.7 °F (37.1 °C) (Tympanic)   Resp 18   Ht 5' 6\" (1.676 m)   Wt 115 kg (254 lb 6.4 oz)   LMP  (LMP Unknown)   SpO2 98%   BMI 41.06 kg/m²      Physical Exam  Vitals and nursing note reviewed.   Constitutional:       Appearance: Normal appearance. She is well-developed. She is obese. She is not ill-appearing.   HENT:      Head: Normocephalic and atraumatic.      Right Ear: External ear normal.      Left Ear: External ear normal.      Nose: Nose normal.      Mouth/Throat:      Mouth: Mucous membranes are moist.      Pharynx: No oropharyngeal exudate or posterior oropharyngeal erythema.   Eyes:      General: No scleral icterus.        Right eye: No discharge.         Left eye: No discharge.      Conjunctiva/sclera: Conjunctivae normal.   Cardiovascular:      Rate and Rhythm: Normal rate.      Heart sounds: No murmur heard.     No gallop.   Pulmonary:      Effort: Pulmonary effort is normal. No respiratory distress.      Breath sounds: Normal breath sounds. No stridor. No wheezing, " rhonchi or rales.   Abdominal:      Palpations: Abdomen is soft.      Tenderness: There is no abdominal tenderness.   Musculoskeletal:         General: No tenderness or deformity.   Skin:     Findings: No erythema or rash.   Neurological:      Mental Status: She is alert. Mental status is at baseline.   Psychiatric:         Behavior: Behavior normal.         Judgment: Judgment normal.           Recent Results (from the past 8736 hour(s))   POCT pregnancy, urine    Collection Time: 09/06/23 10:43 AM   Result Value Ref Range    EXT Preg Test, Ur Negative Negative    Control Valid Valid   Tissue Exam    Collection Time: 09/06/23 11:04 AM   Result Value Ref Range    Case Report       Surgical Pathology Report                         Case: [id number]                                   Authorizing Provider:  Rajeev Lechuga MD       Collected:           09/06/2023 1104              Ordering Location:     [facility]   Received:            09/06/2023 1420                                     Center                                                                       Pathologist:           Chuck Betts MD                                                          Specimens:   A) - Duodenum, duodenum bx r/o celiac                                                               B) - Stomach, gastric bx r/o h pylori                                                               C) - Esophagus, lower esophagus r/o eoe                                                             D) - Esophagus, mid esophagus r/o eoe                                                               E) - Large Intestine, Cecum, cecal bx                                                                F) - Large Intestine, Right/Ascending Colon, ascending colon bx                                     G) - Large Intestine, Transverse Colon, transverse colon bx                                         H) - Large Intestine, Left/Descending Colon,  descending colon bx                                    I) - Large Intestine, Sigmoid Colon, sigmoid colon bx                                               J) - Rectum, rectum bx r/o crohns                                                          Final Diagnosis       A. Duodenum, biopsy:  -   Duodenal mucosa with normal villous architecture and no significant histopathologic change.  -   No evidence of celiac disease.    B. Stomach, biopsy:  -   Gastric antral and oxyntic mucosa with chronic inactive gastritis.   -   Gastric  oxyntic mucosa with no significant histopathologic change.   -   Negative for intestinal metaplasia and dysplasia.  -   Helicobacter pylori immunostain is negative.    C. Esophagus, lower, biopsy:  -   Squamous mucosa with basal layer hyperplasia, spongiosis, elongated papillae, and few intraepithelial eosinophils suggestive of reflux esophagitis.     D. Esophagus, mid, biopsy:  -   Squamous mucosa with mild reactive changes and rare eosinophils.  -   No evidence of eosinophilic esophagitis.    E. Large Intestine, Cecum, biopsy:  -   Colonic mucosa with expanded expanded lymphoid aggregates, favor reactive, see comment.  -   No evidence of acute, chronic, or microscopic colitis.    F. Large Intestine, ascending colon, biopsy:  -   Colonic mucosa with lamina propria expansion by a lymphoplasmacytic infiltrate with expanded lymphoid aggregates.   -   No evidence of acute or microscopic colitis.    G. Large Intestine, Transverse Colon, biopsy:  -   Colonic mucosa with no significant histopathologic change.  -   No evidence of acute, chronic, or microscopic colitis.     H. Large Intestine, descending colon, biopsy:  -   Colonic mucosa with no significant histopathologic change.  -   No evidence of acute, chronic, or microscopic colitis.    I. Large Intestine, Sigmoid Colon, biopsy:  -   Colonic mucosa with no significant histopathologic change.  -   No evidence of acute, chronic, or microscopic  colitis.     J. Rectum, biopsy:  -   Colorectal mucosa with no significant histopathologic change.  -   No evidence of acute, chronic, or microscopic colitis.     Comment: The patient's presentation of hematochezia, loose stools, and abnormal serology testing are noted. The current biopsy series shows focal changes of expanded lymphoplasmacytic infiltrate and expanded lymphoid aggregates. Immunohistochemical stains for lymphoproliferative disorder are performed and within normal limits at this time. There is no evidence of acute colitis or well-developed chronic colitis in the reviewed material. Recommend continued clinical follow-up for the patient symptoms; if the patient's expanded lymphoid aggregates persists further work-up may be performed at a later date. Clinical and endoscopic correlation is advised.       Note       Immunohistochemical stains performed on part E show:  CD20 highlights B cells, which are expanded but concentrated in follicles.  CD3 and CD5 stains highlight increased T cells that are predominantly in the interfollicular areas.  BCL6 and CD10 label germinal centers which are appropriately negative for BCL2.  BCL-1 is negative.  Ki-67 proliferative index is appropriately high in germinal centers.      Additional Information       All reported additional testing was performed with appropriately reactive controls.  These tests were developed and their performance characteristics determined by [facility] or appropriate performing facility, though some tests may be performed on tissues which have not been validated for performance characteristics (such as staining performed on alcohol exposed cell blocks and decalcified tissues).  Results should be interpreted with caution and in the context of the patients’ clinical condition. These tests may not be cleared or approved by the U.S. Food and Drug Administration, though the FDA has determined that such clearance or approval is  "not necessary. These tests are used for clinical purposes and they should not be regarded as investigational or for research. This laboratory has been approved by CLIA 88, designated as a high-complexity laboratory and is qualified to perform these tests.  .Interpretation performed at [facility], [address] [address]        Gross Description       A. The specimen is received in formalin, labeled with the patient's name and hospital number, and is designated \" duodenum biopsy rule out celiac\".  The specimen consists of multiple tan friable soft tissue fragments measuring 1.1 x 0.6 x 0.2 cm in aggregate.  Entirely submitted. Screened cassette.  B. The specimen is received in formalin, labeled with the patient's name and hospital number, and is designated \" gastric biopsy rule out H. pylori\".  The specimen consists of 2 tan friable soft tissue fragments, each measuring 0.4 cm in greatest dimension.  Entirely submitted. Screened cassette.  C. The specimen is received in formalin, labeled with the patient's name and hospital number, and is designated \" lower esophagus rule out EOE\".  The specimen consists of 2 colorless to tan friable soft tissue fragments ranging from 0.3 to 0.5 cm in greatest dimension.  Entirely submitted. Screened cassette.  D. The specimen is received in formalin, labeled with the patient's name and hospital number, and is designated \" mid esophagus rule out EOE\".  The specimen consists of 2 colorless to tan friable soft tissue fragments ranging from 0.4 to 0.5 cm in greatest dimension.  Entirely submitted. Screened cassette.  E. The specimen is received in formalin, labeled with the patient's name and hospital number, and is designated \" cecal biopsy\".  The specimen consists of 3 tan friable soft tissue fragments ranging from 0.3 to 0.4 cm in greatest dimension.  Entirely submitted. Screened cassette.  F. The specimen is received in formalin, labeled with the patient's name and " "hospital number, and is designated \" ascending colon biopsy\".  The specimen consists of multiple tan friable soft tissue fragments measuring 1.0 x 0.4 x 0.2 cm in aggregate.  Entirely submitted. Screened cassette.  G. The specimen is received in formalin, labeled with the patient's name and hospital number, and is designated \" transverse colon biopsy\".  The specimen consists of 3 tan friable soft tissue fragments ranging from 0.3 to 0.5 cm in greatest dimension.  Entirely submitted. Screened cassette.  H. The specimen is received in formalin, labeled with the patient's name and hospital number, and is designated \" descending colon biopsy\".  The specimen consists of 2 tan friable soft tissue fragments, each measuring 0.4 cm in greatest dimension.  Entirely submitted. Screened cassette.  I. The specimen is received in formalin, labeled with the patient's name and hospital number, and is designated \" sigmoid colon biopsy\".  The specimen consists of 2 tan friable soft tissue fragments, each measuring 0.4 cm in greatest dimension.  Entirely submitted. Screened cassette.  J. The specimen is received in formalin, labeled with the patient's name and hospital number, and is designated \" rectum biopsy rule out Crohn's\".  The specimen consists of 3 tan friable soft tissue fragments, each measuring 0.3 cm in greatest dimension.  Entirely submitted. Screened cassette.    Note: The estimated total formalin fixation time based upon information provided by the submitting clinician and the standard processing schedule is under 72 hours.    SSarginson      Clinical Information       History of loose stools, hematochezia, positive IBD panel and iron deficiency anemia-raising the possibility of undiagnosed inflammatory bowel disease given family history of ulcerative colitis in mother.   POCT Rapid Covid Ag    Collection Time: 10/17/23  5:05 PM   Result Value Ref Range    POCT SARS-CoV-2 Ag Negative Negative    VALID CONTROL Valid  "   CBC and Platelet    Collection Time: 12/26/23  3:20 PM   Result Value Ref Range    WBC 12.34 (H) 4.31 - 10.16 Thousand/uL    RBC 5.07 3.81 - 5.12 Million/uL    Hemoglobin 13.4 11.5 - 15.4 g/dL    Hematocrit 41.5 34.8 - 46.1 %    MCV 82 82 - 98 fL    MCH 26.4 (L) 26.8 - 34.3 pg    MCHC 32.3 31.4 - 37.4 g/dL    RDW 14.2 11.6 - 15.1 %    Platelets 312 149 - 390 Thousands/uL    MPV 11.8 8.9 - 12.7 fL   Comprehensive metabolic panel    Collection Time: 12/26/23  3:20 PM   Result Value Ref Range    Sodium 138 135 - 147 mmol/L    Potassium 3.9 3.5 - 5.3 mmol/L    Chloride 104 96 - 108 mmol/L    CO2 26 21 - 32 mmol/L    ANION GAP 8 mmol/L    BUN 14 5 - 25 mg/dL    Creatinine 0.90 0.60 - 1.30 mg/dL    Glucose 87 65 - 140 mg/dL    Calcium 9.3 8.4 - 10.2 mg/dL    AST 13 13 - 39 U/L    ALT 19 7 - 52 U/L    Alkaline Phosphatase 117 (H) 34 - 104 U/L    Total Protein 7.5 6.4 - 8.4 g/dL    Albumin 4.4 3.5 - 5.0 g/dL    Total Bilirubin 0.20 0.20 - 1.00 mg/dL    eGFR 88 ml/min/1.73sq m   Hepatitis B surface antibody    Collection Time: 12/26/23  3:20 PM   Result Value Ref Range    Hep B S Ab 4.47 3-500 mIU/mL mIU/mL   Chronic Hepatitis Panel    Collection Time: 12/26/23  3:20 PM   Result Value Ref Range    Hepatitis B Surface Ag Non-reactive Non-Reactive    Hepatitis C Ab Non-reactive Non-Reactive    Hep B C IgM Non-reactive Non-Reactive    Hep B Core Total Ab Non-reactive Non-Reactive   Quantiferon TB Gold Plus Gray    Collection Time: 12/26/23  3:20 PM   Result Value Ref Range    QFT Nil 0.01 0 - 8.0 IU/ml   Quantiferon TB Gold Plus Green    Collection Time: 12/26/23  3:20 PM   Result Value Ref Range    QFT TB1-NIL 0.00 IU/ml   Quantiferon TB Gold Plus Yellow    Collection Time: 12/26/23  3:20 PM   Result Value Ref Range    QFT TB2-NIL 0.00 IU/ml   Quantiferon TB Gold Plus Purple    Collection Time: 12/26/23  3:20 PM   Result Value Ref Range    QFT Mitogen-NIL 7.27 IU/ml    QFT Final Interpretation Negative Negative    Comprehensive metabolic panel    Collection Time: 01/08/24  4:22 PM   Result Value Ref Range    Sodium 133 (L) 135 - 147 mmol/L    Potassium 4.2 3.5 - 5.3 mmol/L    Chloride 101 96 - 108 mmol/L    CO2 23 21 - 32 mmol/L    ANION GAP 9 mmol/L    BUN 19 5 - 25 mg/dL    Creatinine 0.81 0.60 - 1.30 mg/dL    Glucose 98 65 - 140 mg/dL    Calcium 9.1 8.4 - 10.2 mg/dL    AST 10 (L) 13 - 39 U/L    ALT 15 7 - 52 U/L    Alkaline Phosphatase 106 (H) 34 - 104 U/L    Total Protein 7.3 6.4 - 8.4 g/dL    Albumin 4.3 3.5 - 5.0 g/dL    Total Bilirubin 0.26 0.20 - 1.00 mg/dL    eGFR 100 ml/min/1.73sq m   CBC and Platelet    Collection Time: 01/08/24  4:22 PM   Result Value Ref Range    WBC 13.23 (H) 4.31 - 10.16 Thousand/uL    RBC 5.27 (H) 3.81 - 5.12 Million/uL    Hemoglobin 13.7 11.5 - 15.4 g/dL    Hematocrit 43.2 34.8 - 46.1 %    MCV 82 82 - 98 fL    MCH 26.0 (L) 26.8 - 34.3 pg    MCHC 31.7 31.4 - 37.4 g/dL    RDW 14.6 11.6 - 15.1 %    Platelets 292 149 - 390 Thousands/uL    MPV 11.6 8.9 - 12.7 fL   C-reactive protein    Collection Time: 01/08/24  4:22 PM   Result Value Ref Range    CRP 14.0 (H) <3.0 mg/L   Vitamin D 25 hydroxy    Collection Time: 01/08/24  4:22 PM   Result Value Ref Range    Vit D, 25-Hydroxy 12.4 (L) 30.0 - 100.0 ng/mL       Laboratory Results: I have personally reviewed the pertinent laboratory results/reports         Assessment/Plan:  Problem List Items Addressed This Visit          Digestive    Crohn's disease of small intestine with complication (HCC)    PUD (peptic ulcer disease)       Blood    Iron deficiency anemia       Other    Class 3 severe obesity due to excess calories with serious comorbidity and body mass index (BMI) of 40.0 to 44.9 in adult (HCC) - Primary    Relevant Medications    phentermine 30 MG capsule    topiramate (Topamax) 25 mg tablet           - Discussed options of HealthyCORE-Intensive Lifestyle Intervention Program, Very Low Calorie Diet-VLCD, Conservative Program, Charles-En-Y Gastric  Bypass, and Vertical Sleeve Gastrectomy and the role of weight loss medications.  Not interested in surgery  - Explained the importance of making lifestyle changes with anti-obesity medications.She has been doing strict lifestyle modification  - Patient is interested in pursuing Conservative Program    - Weight loss can improve patient's co-morbid conditions and/or prevent weight-related complications.  - not sexually active  - Interested in weight loss medication to help with weight loss.   - FDA approved weight loss medications reviewed: Wegovy, Saxenda, Zepbound, Qsymia, Contrave, and Phentermine. Wegovy and Saxenda shortage discussed. Off label use of medications discussed. Not interested in injectable medications at this time.    Phentermine can cause agitated states,cardiovascular disease, history of, including uncontrolled hypertension, stroke, coronary artery disease, arrhythmias, and congestive heart failure,concomitant use with MAOIs; at least 14 days should elapse between use of phentermine and an MAOI ,drug abuse, history of glaucoma ,hyperthyroidism ,known hypersensitivity or idiosyncrasy to sympathomimetic amines,nursing or lactating women,pregnancy.  If use more than 1 year at a time and higher doses  it increases the risk of idiopathic pulmonary fibrosis similar to the drug Fenfen.      Topiramate may cause aggressive behavior, mood disorder, anxiety, depression and as well as paresthesias.It can cause exacerbation of depression particularly in patients with previous history of depression. It can also cause dizziness, drowsiness, fatigue, cognitive-linguistic function, memory and language problems, aggressive behavior or mood disorder.  It can also cause metabolic acidosis and predispose individuals to nephrolithiasis nephrocalcinosis and osteomalacia or osteoporosis in all ages.  It can worsen acute myopia with secondary angle-closure glaucoma and have visual side effects if you develop any of the  "symptoms please contact the office immediately and stop taking the medication.  Any psychoactive medication including topiramate is associated with suicidal ideations and tendencies however this study as several limitations.    You are accepting of these risks.      -She made an informed decision to start Phentermine/Topiramate     Goals:  Do not skip meals.  Food log (ie.) www.myfitnesspal.com,sparkpeople.com,loseit.com,calorieking.com,etc. baritastic (use skinnytaste.com, [employer] or smartphone [employer]rick [place] for recipes)  No sugary beverages. At least 64oz of water daily.  Start food logging.  1400 calories per day.    F/u in 3 months         Read package inserts for all medications before starting a new medications, call me if you have any questions.    Patient was given opportunity to ask questions and all questions were answered.    Disclaimer: Portions of the record may have been created with voice recognition software. Occasional wrong word or \"sound a like\" substitutions may have occurred due to the inherent limitations of voice recognition software. Read the chart carefully and recognize, using context, where substitutions have occurred. I have used the Epic copy/forward function to compose this note. I have reviewed my current note to ensure it reflects the current patient status, exam, assessment and plan.    "

## 2024-03-14 PROBLEM — E66.813 CLASS 3 SEVERE OBESITY DUE TO EXCESS CALORIES WITH SERIOUS COMORBIDITY AND BODY MASS INDEX (BMI) OF 40.0 TO 44.9 IN ADULT (HCC): Status: ACTIVE | Noted: 2024-03-14

## 2024-03-14 PROBLEM — E66.01 CLASS 3 SEVERE OBESITY DUE TO EXCESS CALORIES WITH SERIOUS COMORBIDITY AND BODY MASS INDEX (BMI) OF 40.0 TO 44.9 IN ADULT (HCC): Status: ACTIVE | Noted: 2024-03-14

## 2024-03-14 PROBLEM — M25.9 SHOULDER DISORDER: Status: RESOLVED | Noted: 2018-11-15 | Resolved: 2024-03-14

## 2024-03-25 ENCOUNTER — TELEPHONE (OUTPATIENT)
Dept: GASTROENTEROLOGY | Facility: CLINIC | Age: 29
End: 2024-03-25

## 2024-03-25 ENCOUNTER — TELEPHONE (OUTPATIENT)
Dept: FAMILY MEDICINE CLINIC | Facility: CLINIC | Age: 29
End: 2024-03-25

## 2024-03-25 NOTE — TELEPHONE ENCOUNTER
Order signed by Dr. Lechuga and then faxed to number provided. Fax confirmation slip received. Also scanned into media.

## 2024-03-25 NOTE — TELEPHONE ENCOUNTER
3/25 received call from [facility] they asking for [employer] order and lab order.     Please print the order in media and have provider fill out if they want any labs done and sign order. Plz fax to me at [phone] tu

## 2024-03-25 NOTE — TELEPHONE ENCOUNTER
Fax from [facility] [address], requesting prior auth on Phentermine HCI 30mg   (Key: [id number])

## 2024-03-27 ENCOUNTER — TRANSCRIBE ORDERS (OUTPATIENT)
Dept: GASTROENTEROLOGY | Facility: CLINIC | Age: 29
End: 2024-03-27

## 2024-03-28 NOTE — TELEPHONE ENCOUNTER
PA for Phentermine 30 mg capsule    Submitted via    [x]CMM-KEY [id number]  [][facility]-Case ID #   []Faxed to plan   []Other website   []Phone call Case ID #     Office notes sent, clinical questions answered. Awaiting determination    Turnaround time for your insurance to make a decision on your Prior Authorization can take 7-21 business days.

## 2024-03-29 ENCOUNTER — HOSPITAL ENCOUNTER (OUTPATIENT)
Dept: INFUSION CENTER | Facility: CLINIC | Age: 29
End: 2024-03-29
Payer: COMMERCIAL

## 2024-03-29 VITALS
SYSTOLIC BLOOD PRESSURE: 113 MMHG | RESPIRATION RATE: 18 BRPM | OXYGEN SATURATION: 98 % | DIASTOLIC BLOOD PRESSURE: 74 MMHG | HEART RATE: 91 BPM | TEMPERATURE: 98.3 F

## 2024-03-29 DIAGNOSIS — K50.019 CROHN'S DISEASE OF SMALL INTESTINE WITH COMPLICATION (HCC): Primary | ICD-10-CM

## 2024-03-29 PROCEDURE — 96375 TX/PRO/DX INJ NEW DRUG ADDON: CPT

## 2024-03-29 PROCEDURE — 96415 CHEMO IV INFUSION ADDL HR: CPT

## 2024-03-29 PROCEDURE — 96413 CHEMO IV INFUSION 1 HR: CPT

## 2024-03-29 RX ORDER — ACETAMINOPHEN 325 MG/1
650 TABLET ORAL ONCE
OUTPATIENT
Start: 2024-05-24

## 2024-03-29 RX ORDER — ACETAMINOPHEN 325 MG/1
650 TABLET ORAL ONCE
Status: COMPLETED | OUTPATIENT
Start: 2024-03-29 | End: 2024-03-29

## 2024-03-29 RX ORDER — DIPHENHYDRAMINE HCL 25 MG
25 TABLET ORAL ONCE
OUTPATIENT
Start: 2024-05-24

## 2024-03-29 RX ORDER — METHYLPREDNISOLONE SODIUM SUCCINATE 40 MG/ML
40 INJECTION, POWDER, LYOPHILIZED, FOR SOLUTION INTRAMUSCULAR; INTRAVENOUS ONCE
OUTPATIENT
Start: 2024-05-24

## 2024-03-29 RX ORDER — SODIUM CHLORIDE 9 MG/ML
20 INJECTION, SOLUTION INTRAVENOUS ONCE
OUTPATIENT
Start: 2024-05-24

## 2024-03-29 RX ORDER — METHYLPREDNISOLONE SODIUM SUCCINATE 40 MG/ML
40 INJECTION, POWDER, LYOPHILIZED, FOR SOLUTION INTRAMUSCULAR; INTRAVENOUS ONCE
Status: COMPLETED | OUTPATIENT
Start: 2024-03-29 | End: 2024-03-29

## 2024-03-29 RX ORDER — DIPHENHYDRAMINE HCL 25 MG
25 TABLET ORAL ONCE
Status: COMPLETED | OUTPATIENT
Start: 2024-03-29 | End: 2024-03-29

## 2024-03-29 RX ORDER — SODIUM CHLORIDE 9 MG/ML
20 INJECTION, SOLUTION INTRAVENOUS ONCE
Status: COMPLETED | OUTPATIENT
Start: 2024-03-29 | End: 2024-03-29

## 2024-03-29 RX ADMIN — ACETAMINOPHEN 650 MG: 325 TABLET ORAL at 08:55

## 2024-03-29 RX ADMIN — DIPHENHYDRAMINE HYDROCHLORIDE 25 MG: 25 TABLET ORAL at 08:55

## 2024-03-29 RX ADMIN — SODIUM CHLORIDE 20 ML/HR: 0.9 INJECTION, SOLUTION INTRAVENOUS at 08:55

## 2024-03-29 RX ADMIN — METHYLPREDNISOLONE SODIUM SUCCINATE 40 MG: 40 INJECTION, POWDER, FOR SOLUTION INTRAMUSCULAR; INTRAVENOUS at 08:55

## 2024-03-29 RX ADMIN — SODIUM CHLORIDE 600 MG: 0.9 INJECTION, SOLUTION INTRAVENOUS at 09:37

## 2024-03-29 NOTE — PROGRESS NOTES
Pt here for inflectra infusion. Pt offered no complaints today.   Denied any recent infections or antibiotic use.   Vitals stable. Pt resting comfortably in chair with call bell in place.

## 2024-03-29 NOTE — PROGRESS NOTES
Vickie Stock  tolerated treatment well with no complications.      Pt stated her insurance company is no longer allowing her to come to this infusion center she needs to change. Pt stated her insurance company is currently working on this and scheduling at a center in [place].

## 2024-03-29 NOTE — TELEPHONE ENCOUNTER
PA for Phentermine 30 mg capsule Approved     Date(s) approved 3- - 6-      Patient advised by [x] gamigo Message                      [] Phone call       Pharmacy advised by [x]Fax                                     []Phone call    Approval letter scanned into Media Yes

## 2024-04-04 ENCOUNTER — TRANSCRIBE ORDERS (OUTPATIENT)
Dept: GASTROENTEROLOGY | Facility: CLINIC | Age: 29
End: 2024-04-04

## 2024-04-07 DIAGNOSIS — E66.01 CLASS 3 SEVERE OBESITY DUE TO EXCESS CALORIES WITH SERIOUS COMORBIDITY AND BODY MASS INDEX (BMI) OF 40.0 TO 44.9 IN ADULT (HCC): ICD-10-CM

## 2024-04-07 RX ORDER — TOPIRAMATE 25 MG/1
TABLET ORAL
Qty: 53 TABLET | Refills: 0 | Status: SHIPPED | OUTPATIENT
Start: 2024-04-07 | End: 2024-04-10 | Stop reason: SDUPTHER

## 2024-04-10 ENCOUNTER — OFFICE VISIT (OUTPATIENT)
Dept: FAMILY MEDICINE CLINIC | Facility: CLINIC | Age: 29
End: 2024-04-10

## 2024-04-10 VITALS
RESPIRATION RATE: 18 BRPM | HEART RATE: 112 BPM | OXYGEN SATURATION: 98 % | SYSTOLIC BLOOD PRESSURE: 120 MMHG | TEMPERATURE: 98.5 F | DIASTOLIC BLOOD PRESSURE: 80 MMHG | BODY MASS INDEX: 39.15 KG/M2 | WEIGHT: 243.6 LBS | HEIGHT: 66 IN

## 2024-04-10 DIAGNOSIS — K27.9 PUD (PEPTIC ULCER DISEASE): Primary | ICD-10-CM

## 2024-04-10 DIAGNOSIS — Z13.6 ENCOUNTER FOR LIPID SCREENING FOR CARDIOVASCULAR DISEASE: ICD-10-CM

## 2024-04-10 DIAGNOSIS — Z13.1 ENCOUNTER FOR SCREENING FOR DIABETES MELLITUS: ICD-10-CM

## 2024-04-10 DIAGNOSIS — E66.01 CLASS 3 SEVERE OBESITY DUE TO EXCESS CALORIES WITH SERIOUS COMORBIDITY AND BODY MASS INDEX (BMI) OF 40.0 TO 44.9 IN ADULT (HCC): ICD-10-CM

## 2024-04-10 DIAGNOSIS — J01.90 ACUTE RHINOSINUSITIS: ICD-10-CM

## 2024-04-10 DIAGNOSIS — Z13.220 ENCOUNTER FOR LIPID SCREENING FOR CARDIOVASCULAR DISEASE: ICD-10-CM

## 2024-04-10 DIAGNOSIS — Z00.01 ENCOUNTER FOR GENERAL ADULT MEDICAL EXAMINATION WITH ABNORMAL FINDINGS: ICD-10-CM

## 2024-04-10 LAB — S PYO DNA THROAT QL NAA+PROBE: NOT DETECTED

## 2024-04-10 RX ORDER — TOPIRAMATE 25 MG/1
25 TABLET ORAL 2 TIMES DAILY
Qty: 60 TABLET | Refills: 2 | Status: SHIPPED | OUTPATIENT
Start: 2024-04-10

## 2024-04-10 RX ORDER — CETIRIZINE HYDROCHLORIDE 10 MG/1
10 TABLET ORAL DAILY
Qty: 30 TABLET | Refills: 1 | Status: SHIPPED | OUTPATIENT
Start: 2024-04-10

## 2024-04-10 RX ORDER — PANTOPRAZOLE SODIUM 40 MG/1
40 TABLET, DELAYED RELEASE ORAL DAILY
Qty: 90 TABLET | Refills: 0 | Status: SHIPPED | OUTPATIENT
Start: 2024-04-10 | End: 2024-07-09

## 2024-04-10 RX ORDER — FLUTICASONE PROPIONATE 50 MCG
1 SPRAY, SUSPENSION (ML) NASAL DAILY
Qty: 16 ML | Refills: 1 | Status: SHIPPED | OUTPATIENT
Start: 2024-04-10

## 2024-04-10 RX ORDER — PHENTERMINE HYDROCHLORIDE 30 MG/1
30 CAPSULE ORAL EVERY MORNING
Qty: 30 CAPSULE | Refills: 0 | Status: SHIPPED | OUTPATIENT
Start: 2024-04-10

## 2024-04-10 RX ORDER — AMOXICILLIN AND CLAVULANATE POTASSIUM 875; 125 MG/1; MG/1
1 TABLET, FILM COATED ORAL EVERY 12 HOURS SCHEDULED
Qty: 14 TABLET | Refills: 0 | Status: SHIPPED | OUTPATIENT
Start: 2024-04-10 | End: 2024-04-17

## 2024-04-10 NOTE — PROGRESS NOTES
ADULT ANNUAL PHYSICAL  [facility]    NAME: Vickie Stock  AGE: 29 y.o. SEX: female  : 1995     DATE: 2024     Assessment and Plan:     Problem List Items Addressed This Visit          Digestive    PUD (peptic ulcer disease)    Relevant Medications    pantoprazole (PROTONIX) 40 mg tablet       Other    Class 3 severe obesity due to excess calories with serious comorbidity and body mass index (BMI) of 40.0 to 44.9 in adult (HCC)    Relevant Medications    topiramate (TOPAMAX) 25 mg tablet    phentermine 30 MG capsule    Other Relevant Orders    Comprehensive metabolic panel     Other Visit Diagnoses       Acute rhinosinusitis    -  Primary    Relevant Medications    amoxicillin-clavulanate (AUGMENTIN) 875-125 mg per tablet    fluticasone (FLONASE) 50 mcg/act nasal spray    cetirizine (ZyrTEC) 10 mg tablet    Other Relevant Orders    POCT rapid PCR strepA (Completed)    Encounter for general adult medical examination with abnormal findings        Relevant Orders    Comprehensive metabolic panel    Encounter for lipid screening for cardiovascular disease        Relevant Orders    Lipid panel    Encounter for screening for diabetes mellitus        Relevant Orders    Hemoglobin A1C        Last 11 pounds since the last visit and is doing well on phentermine topiramate.  Check cmp  Start augmentin, pantoprazole while on antibiotics for GI protection.  Rapid strep was negative high suspicion for bacterial sinusitis.    Immunizations and preventive care screenings were discussed with patient today. Appropriate education was printed on patient's after visit summary.    Counseling:  Alcohol/drug use: discussed moderation in alcohol intake, the recommendations for healthy alcohol use, and avoidance of illicit drug use.  Dental Health: discussed importance of regular tooth brushing, flossing, and dental visits.  Injury prevention: discussed  safety/seat belts, safety helmets, smoke detectors, carbon dioxide detectors, and smoking near bedding or upholstery.  Sexual health: discussed sexually transmitted diseases, partner selection, use of condoms, avoidance of unintended pregnancy, and contraceptive alternatives.  Exercise: the importance of regular exercise/physical activity was discussed. Recommend exercise 3-5 times per week for at least 30 minutes.       Depression Screening and Follow-up Plan: Patient was screened for depression during today's encounter. They screened negative with a PHQ-9 score of 0.        Return in about 3 months (around 7/10/2024) for Next scheduled follow up.     Chief Complaint:     Chief Complaint   Patient presents with    Follow-up    Facial Pain    Cough    Nasal Congestion      History of Present Illness:     Adult Annual Physical   Patient here for a comprehensive physical exam. The patient reports problems - weight loss .    Diet and Physical Activity  Diet/Nutrition: well balanced diet and consuming 3-5 servings of fruits/vegetables daily.   Exercise: moderate cardiovascular exercise.      Depression Screening  PHQ-2/9 Depression Screening    Little interest or pleasure in doing things: 0 - not at all  Feeling down, depressed, or hopeless: 0 - not at all  Trouble falling or staying asleep, or sleeping too much: 0 - not at all  Feeling tired or having little energy: 0 - not at all  Poor appetite or overeatin - not at all  Feeling bad about yourself - or that you are a failure or have let yourself or your family down: 0 - not at all  Trouble concentrating on things, such as reading the newspaper or watching television: 0 - not at all  Moving or speaking so slowly that other people could have noticed. Or the opposite - being so fidgety or restless that you have been moving around a lot more than usual: 0 - not at all  Thoughts that you would be better off dead, or of hurting yourself in some way: 0 - not at  all  PHQ-9 Score: 0  PHQ-9 Interpretation: No or Minimal depression       General Health  Sleep: sleeps well.   Hearing:  grossly normal .  Vision: goes for regular eye exams and wears glasses.   Dental: regular dental visits.       /GYN Health  Follows with gynecology? yes   Last menstrual period: No LMP recorded (lmp unknown).    Contraceptive method:  barrier .  History of STDs?: no.     Advanced Care Planning  Do you have an advanced directive? no  Do you have a durable medical power of ? no  ACP document given to the patient? no      Review of Systems:     Review of Systems   Constitutional:  Negative for fatigue and fever.   HENT:  Positive for congestion, sinus pressure and sinus pain. Negative for ear pain, facial swelling, mouth sores, rhinorrhea, sore throat and trouble swallowing.    Eyes:  Negative for pain and redness.   Respiratory:  Positive for cough. Negative for shortness of breath and wheezing.    Cardiovascular:  Negative for chest pain, palpitations and leg swelling.   Gastrointestinal:  Positive for abdominal pain. Negative for blood in stool, constipation, diarrhea and nausea.   Genitourinary:  Negative for dysuria, hematuria and urgency.   Musculoskeletal:  Negative for arthralgias, back pain and myalgias.   Skin:  Negative for rash and wound.   Neurological:  Negative for seizures, syncope and headaches.   Hematological:  Negative for adenopathy.   Psychiatric/Behavioral:  Negative for agitation and behavioral problems.       Past Medical History:     Past Medical History:   Diagnosis Date    Chronic back pain     Colitis     History of transfusion     Multiple abrasions 2013    Proteinuria     Shoulder disorder     Urinary tract infection     pylonephritis      Past Surgical History:     Past Surgical History:   Procedure Laterality Date    COLONOSCOPY      DILATION AND CURETTAGE OF UTERUS N/A 08/23/2021    Procedure: DILATATION AND CURETTAGE (D&C);  Surgeon: Rona  MD Vivien;  [facility];  Service: Obstetrics    EXAMINATION UNDER ANESTHESIA N/A 08/23/2021    Procedure: EXAM UNDER ANESTHESIA (EUA) WITH INSERTION OF BAKRI BALLOON;  Surgeon: Rona Puga MD;  [facility];  Service: Obstetrics    TONSILLECTOMY  2009    UPPER GASTROINTESTINAL ENDOSCOPY      WISDOM TOOTH EXTRACTION        Social History:     Social History     Socioeconomic History    Marital status: Single     Spouse name: None    Number of children: None    Years of education: None    Highest education level: None   Occupational History    None   Tobacco Use    Smoking status: Former     Current packs/day: 0.00     Types: Cigarettes     Quit date: 12/1/2020     Years since quitting: 3.3    Smokeless tobacco: Never    Tobacco comments:     use Vape   Vaping Use    Vaping status: Every Day   Substance and Sexual Activity    Alcohol use: Not Currently    Drug use: Not Currently     Types: Marijuana     Comment: hasn't used since before pregnancy    Sexual activity: Yes   Other Topics Concern    None   Social History Narrative    None     Social Determinants of Health     Financial Resource Strain: Not on file   Food Insecurity: Not on file   Transportation Needs: Not on file   Physical Activity: Not on file   Stress: Not on file   Social Connections: Not on file   Intimate Partner Violence: Not on file   Housing Stability: Not on file      Family History:     Family History   Problem Relation Age of Onset    Hypertension Mother     Arthritis Mother     Depression Mother     COPD Mother     Gestational diabetes Mother     Lung cancer Paternal Grandmother     Cancer Paternal Grandmother     COPD Father     Depression Sister     Anxiety disorder Sister     Depression Brother     Bipolar disorder Brother     Depression Brother     No Known Problems Maternal Grandmother     COPD Maternal Grandfather     Cancer Maternal Grandfather     Heart disease Paternal Grandfather     Depression Brother   "     Current Medications:     Current Outpatient Medications   Medication Sig Dispense Refill    amoxicillin-clavulanate (AUGMENTIN) 875-125 mg per tablet Take 1 tablet by mouth every 12 (twelve) hours for 7 days 14 tablet 0    cetirizine (ZyrTEC) 10 mg tablet Take 1 tablet (10 mg total) by mouth daily 30 tablet 1    ergocalciferol (ERGOCALCIFEROL) 1.25 MG (52328 UT) capsule Take 1 capsule (50,000 Units total) by mouth once a week 8 capsule 0    Ferrous Sulfate (IRON PO) Take 1 tablet every day by oral route.      fluticasone (FLONASE) 50 mcg/act nasal spray 1 spray into each nostril daily 16 mL 1    ketoconazole (NIZORAL) 2 % shampoo as needed      pantoprazole (PROTONIX) 40 mg tablet Take 1 tablet (40 mg total) by mouth daily 90 tablet 0    phentermine 30 MG capsule Take 1 capsule (30 mg total) by mouth every morning 30 capsule 0    topiramate (TOPAMAX) 25 mg tablet Take 1 tablet (25 mg total) by mouth 2 (two) times a day 60 tablet 2     No current facility-administered medications for this visit.      Allergies:     No Known Allergies   Physical Exam:     /80 (BP Location: Left arm, Patient Position: Sitting, Cuff Size: Large)   Pulse (!) 112   Temp 98.5 °F (36.9 °C) (Tympanic)   Resp 18   Ht 5' 6\" (1.676 m)   Wt 110 kg (243 lb 9.6 oz)   LMP  (LMP Unknown)   SpO2 98%   BMI 39.32 kg/m²     Physical Exam  Vitals and nursing note reviewed.   Constitutional:       Appearance: She is well-developed.   HENT:      Head: Normocephalic and atraumatic.      Right Ear: Tympanic membrane, ear canal and external ear normal.      Left Ear: Tympanic membrane, ear canal and external ear normal.      Nose: Rhinorrhea present. No congestion.      Right Turbinates: Enlarged, swollen and pale.      Left Turbinates: Enlarged, swollen and pale.      Right Sinus: Maxillary sinus tenderness and frontal sinus tenderness present.      Left Sinus: Maxillary sinus tenderness and frontal sinus tenderness present.      " Mouth/Throat:      Mouth: Mucous membranes are moist.      Pharynx: Posterior oropharyngeal erythema present. No oropharyngeal exudate.   Eyes:      General: No scleral icterus.        Right eye: No discharge.         Left eye: No discharge.      Conjunctiva/sclera: Conjunctivae normal.      Pupils: Pupils are equal, round, and reactive to light.   Neck:      Thyroid: No thyromegaly.   Cardiovascular:      Rate and Rhythm: Normal rate and regular rhythm.      Heart sounds: No murmur heard.     No gallop.   Pulmonary:      Effort: Pulmonary effort is normal. No respiratory distress.      Breath sounds: Normal breath sounds. No wheezing or rales.   Abdominal:      Palpations: Abdomen is soft.      Tenderness: There is no abdominal tenderness.   Musculoskeletal:         General: No tenderness or deformity.      Cervical back: Normal range of motion.      Right lower leg: No edema.      Left lower leg: No edema.   Lymphadenopathy:      Cervical: Cervical adenopathy present.   Skin:     General: Skin is warm.      Capillary Refill: Capillary refill takes less than 2 seconds.      Findings: No erythema or rash.   Neurological:      Mental Status: She is alert and oriented to person, place, and time.      Deep Tendon Reflexes: Reflexes normal.   Psychiatric:         Behavior: Behavior normal.         Thought Content: Thought content normal.         Judgment: Judgment normal.          Ashley Butler MD   [facility]

## 2024-04-23 ENCOUNTER — TELEPHONE (OUTPATIENT)
Age: 29
End: 2024-04-23

## 2024-04-23 NOTE — TELEPHONE ENCOUNTER
Rep form [facility] calling about some issues with dosage for Pt's Infliximab, transferred to [facility] to assist

## 2024-05-09 DIAGNOSIS — E66.01 CLASS 3 SEVERE OBESITY DUE TO EXCESS CALORIES WITH SERIOUS COMORBIDITY AND BODY MASS INDEX (BMI) OF 40.0 TO 44.9 IN ADULT (HCC): ICD-10-CM

## 2024-05-09 DIAGNOSIS — J01.90 ACUTE RHINOSINUSITIS: ICD-10-CM

## 2024-05-09 DIAGNOSIS — K27.9 PUD (PEPTIC ULCER DISEASE): ICD-10-CM

## 2024-05-09 RX ORDER — PANTOPRAZOLE SODIUM 40 MG/1
40 TABLET, DELAYED RELEASE ORAL DAILY
Qty: 90 TABLET | Refills: 1 | Status: SHIPPED | OUTPATIENT
Start: 2024-05-09 | End: 2024-05-16 | Stop reason: SDUPTHER

## 2024-05-09 RX ORDER — CETIRIZINE HYDROCHLORIDE 10 MG/1
10 TABLET ORAL DAILY
Qty: 30 TABLET | Refills: 5 | Status: SHIPPED | OUTPATIENT
Start: 2024-05-09 | End: 2024-05-16 | Stop reason: SDUPTHER

## 2024-05-09 RX ORDER — PHENTERMINE HYDROCHLORIDE 30 MG/1
30 CAPSULE ORAL EVERY MORNING
Qty: 30 CAPSULE | Refills: 0 | Status: SHIPPED | OUTPATIENT
Start: 2024-05-09

## 2024-05-14 ENCOUNTER — TELEPHONE (OUTPATIENT)
Dept: FAMILY MEDICINE CLINIC | Facility: CLINIC | Age: 29
End: 2024-05-14

## 2024-05-14 DIAGNOSIS — E55.9 VITAMIN D DEFICIENCY: ICD-10-CM

## 2024-05-14 RX ORDER — ERGOCALCIFEROL 1.25 MG/1
50000 CAPSULE ORAL WEEKLY
Qty: 12 CAPSULE | Refills: 0 | Status: SHIPPED | OUTPATIENT
Start: 2024-05-14

## 2024-05-14 NOTE — TELEPHONE ENCOUNTER
Fax from [employer] requesting new RX:  Vitamin D cap   [employer] FAX: [phone]

## 2024-05-16 DIAGNOSIS — E66.01 CLASS 3 SEVERE OBESITY DUE TO EXCESS CALORIES WITH SERIOUS COMORBIDITY AND BODY MASS INDEX (BMI) OF 40.0 TO 44.9 IN ADULT (HCC): ICD-10-CM

## 2024-05-16 DIAGNOSIS — J01.90 ACUTE RHINOSINUSITIS: ICD-10-CM

## 2024-05-16 DIAGNOSIS — K27.9 PUD (PEPTIC ULCER DISEASE): ICD-10-CM

## 2024-05-16 NOTE — TELEPHONE ENCOUNTER
Message sent via [employer] Dr. Butler! I submitted a request with [facility] to set up mail order prescriptions for all of my medications, but they said they haven’t heard back from your office for approval. They asked me to reach out to see if you could reply to the fax or call in to the number at [phone]. Let me know if you have any concerns with this, I know there were a few that were prescribed by my gastro but I thought you may be able to approve since they were all in my chart. Let me know! Thank you!

## 2024-05-17 RX ORDER — PANTOPRAZOLE SODIUM 40 MG/1
40 TABLET, DELAYED RELEASE ORAL DAILY
Qty: 90 TABLET | Refills: 1 | Status: SHIPPED | OUTPATIENT
Start: 2024-05-17 | End: 2024-11-13

## 2024-05-17 RX ORDER — CETIRIZINE HYDROCHLORIDE 10 MG/1
10 TABLET ORAL DAILY
Qty: 90 TABLET | Refills: 1 | Status: SHIPPED | OUTPATIENT
Start: 2024-05-17

## 2024-05-17 RX ORDER — FLUTICASONE PROPIONATE 50 MCG
1 SPRAY, SUSPENSION (ML) NASAL DAILY
Qty: 48 ML | Refills: 0 | Status: SHIPPED | OUTPATIENT
Start: 2024-05-17

## 2024-05-20 RX ORDER — PHENTERMINE HYDROCHLORIDE 30 MG/1
30 CAPSULE ORAL EVERY MORNING
Qty: 30 CAPSULE | Refills: 0 | Status: SHIPPED | OUTPATIENT
Start: 2024-05-20

## 2024-05-20 RX ORDER — TOPIRAMATE 25 MG/1
25 TABLET ORAL 2 TIMES DAILY
Qty: 60 TABLET | Refills: 2 | Status: SHIPPED | OUTPATIENT
Start: 2024-05-20

## 2024-06-10 ENCOUNTER — OFFICE VISIT (OUTPATIENT)
Dept: GASTROENTEROLOGY | Facility: AMBULARY SURGERY CENTER | Age: 29
End: 2024-06-10
Payer: COMMERCIAL

## 2024-06-10 VITALS
BODY MASS INDEX: 36.26 KG/M2 | WEIGHT: 225.6 LBS | DIASTOLIC BLOOD PRESSURE: 88 MMHG | HEART RATE: 75 BPM | OXYGEN SATURATION: 98 % | HEIGHT: 66 IN | SYSTOLIC BLOOD PRESSURE: 132 MMHG

## 2024-06-10 DIAGNOSIS — K21.9 GASTROESOPHAGEAL REFLUX DISEASE WITHOUT ESOPHAGITIS: ICD-10-CM

## 2024-06-10 DIAGNOSIS — E55.9 VITAMIN D DEFICIENCY: ICD-10-CM

## 2024-06-10 DIAGNOSIS — J01.90 ACUTE RHINOSINUSITIS: ICD-10-CM

## 2024-06-10 DIAGNOSIS — K26.9 DUODENAL ULCER: ICD-10-CM

## 2024-06-10 DIAGNOSIS — K50.019 CROHN'S DISEASE OF SMALL INTESTINE WITH COMPLICATION (HCC): Primary | ICD-10-CM

## 2024-06-10 PROCEDURE — 99214 OFFICE O/P EST MOD 30 MIN: CPT | Performed by: PHYSICIAN ASSISTANT

## 2024-06-10 NOTE — PATIENT INSTRUCTIONS
Get labs and fecal calprotectin done  Wait to start high dose vit D until repeat vit D level  Should take 1000 units vit D daily over the counter  MRI before follow up appt in the fall, call [phone] to schedule  Colonoscopy to be scheduled at next appt  Schedule dermatology appt for yearly visit     Call in the meantime with any worsening or new symptoms

## 2024-06-10 NOTE — PROGRESS NOTES
Assessment and Plan    #1. Crohn's disease of small intestine: stricturing disease involving distal TI noted on MRE and colonoscopy in 2023. Has completed induction dosing of infliximab.  -discussed crohn's disease, medications, goals for remission in detail and answered patient's questions    -needs to complete repeat labs and stool testing ordered last visit including CRP and fecal calprotectin  -continue with infliximab dosing Q8 weeks  -avoid NSAIDs  -annual derm visit discussed, referral placed last visit  -avoid live vaccines  -follow up in 4 months with MRE prior to appt , call sooner if any issues   -will schedule colonoscopy at next appt     #2. Duodenal ulcers  -continue PPI daily  -avoid NSAIDs    #3. Vitamin D deficiency: s/p 8 week high dose vitamin D supplementation  -get Vit D levels checked  -recommend 1000 units vit D daily     #4. GERD  -continue PPI daily  -anti-reflux diet and measures       --------------------------------------------------------------------------------------------------------------------    Chief Complaint: f/u crohn's     HPI: Vickie Stock is a 29 y.o. female with a history of Crohn's disease of the small intestine, peptic ulcer disease, vitamin D deficiency who presents today for follow up.  She was last seen in March.  At that time she was in the process of doing her loading doses of infliximab.  She has not had the blood work done that was ordered at that time.  She did complete 8 weeks of vitamin D supplementation and is due to have this repeated as well. Reports that her bowels are about the same, denies diarrhea. Moves bowels every other day, has blood in stool once a week per patient. Denies mucus. Has a facial rash for last 1.5 years. Has knee joint pain for 2 years, elbow pain for last 2 weeks. Has not seen derm. Has occasional nausea, no vomiting. Heartburn better controlled on PPI.    Patient's last endoscopy and colonoscopy were in December 2023.  Colonoscopy  was notable for inflammation in the TI with inflamed stricture.  She also an MRE in September 2023 which was notable for TI inflammation and stricturing disease    Review of Systems:   General: negative for fatigue, fever, night sweats or unexpected weight loss  Psychological: negative for anxiety or depression  Ophthalmic: negative for blurry vision or scleral icterus  ENT: negative for headaches, oral lesions, sore throat, vocal changes or dysphagia  Hematological and Lymphatic: negative for pallor or swollen lymph nodes  Respiratory: negative for cough, shortness of breath or wheezing  Cardiovascular: negative for chest pain, edema or murmur  Gastrointestinal: as mentioned in HPI  Genito-Urinary: negative for dysuria or incontinence  Musculoskeletal: negative for joint stiffness or joint swelling; joint pain in knee and elbow  Dermatological: negative for pruritus or jaundice; +rash on face    Current Medications  Current Outpatient Medications   Medication Sig Dispense Refill    cetirizine (ZyrTEC) 10 mg tablet Take 1 tablet (10 mg total) by mouth daily 90 tablet 1    ergocalciferol (ERGOCALCIFEROL) 1.25 MG (68328 UT) capsule Take 1 capsule (50,000 Units total) by mouth once a week 12 capsule 0    Ferrous Sulfate (IRON PO) Take 1 tablet every day by oral route.      fluticasone (FLONASE) 50 mcg/act nasal spray 1 spray into each nostril daily 48 mL 0    ketoconazole (NIZORAL) 2 % shampoo as needed      pantoprazole (PROTONIX) 40 mg tablet Take 1 tablet (40 mg total) by mouth daily 90 tablet 1    phentermine 30 MG capsule Take 1 capsule (30 mg total) by mouth every morning 30 capsule 0    topiramate (TOPAMAX) 25 mg tablet Take 1 tablet (25 mg total) by mouth 2 (two) times a day 60 tablet 2     No current facility-administered medications for this visit.       Past Medical History  Past Medical History:   Diagnosis Date    Chronic back pain     Colitis     History of transfusion     Multiple abrasions 2013     Proteinuria     Shoulder disorder     Urinary tract infection     pylonephritis       Past Surgical History  Past Surgical History:   Procedure Laterality Date    COLONOSCOPY      DILATION AND CURETTAGE OF UTERUS N/A 08/23/2021    Procedure: DILATATION AND CURETTAGE (D&C);  Surgeon: Rona Puga MD;  [facility];  Service: Obstetrics    EXAMINATION UNDER ANESTHESIA N/A 08/23/2021    Procedure: EXAM UNDER ANESTHESIA (EUA) WITH INSERTION OF BAKRI BALLOON;  Surgeon: Rona Puga MD;  [facility] ;  Service: Obstetrics    TONSILLECTOMY  2009    UPPER GASTROINTESTINAL ENDOSCOPY      WISDOM TOOTH EXTRACTION         Past Social History   Social History     Socioeconomic History    Marital status: Single     Spouse name: Not on file    Number of children: Not on file    Years of education: Not on file    Highest education level: Not on file   Occupational History    Not on file   Tobacco Use    Smoking status: Former     Current packs/day: 0.00     Types: Cigarettes     Quit date: 12/1/2020     Years since quitting: 3.5    Smokeless tobacco: Never    Tobacco comments:     use Vape   Vaping Use    Vaping status: Every Day   Substance and Sexual Activity    Alcohol use: Not Currently    Drug use: Not Currently     Types: Marijuana     Comment: hasn't used since before pregnancy    Sexual activity: Yes   Other Topics Concern    Not on file   Social History Narrative    Not on file     Social Determinants of Health     Financial Resource Strain: Not on file   Food Insecurity: Not on file   Transportation Needs: Not on file   Physical Activity: Not on file   Stress: Not on file   Social Connections: Not on file   Intimate Partner Violence: Not on file   Housing Stability: Not on file       The following portions of the patient's history were reviewed and updated as appropriate: allergies, current medications, past family history, past medical history, past social history, past surgical history, and  "problem list.    Vital Signs  /88   BP Location Right arm   Patient Position Sitting   Cuff Size Standard   Pulse 75   SpO2 98 %   Weight 102 kg (225 lb 9.6 oz)   Height 5' 6\" (1.676 m)   Pain Score 0-No pain       Physical Exam:  General appearance: alert, cooperative, no distress  HEENT: normocephalic, anicteric, no eye erythema or discharge, no oropharyngeal thrush  Neck: supple, trachea midline, no adenopathy  Lungs: CTA b/l, no rales, rhonchi, or wheezing, unlabored respirations  Heart: RRR, no murmur, rubs, or gallops  Abdomen: soft, non-tender, non-distended, normal bowel sounds, no masses or organomegaly  Rectal: deferred  Extremities: no cyanosis, clubbing, or edema  Musculoskeletal: normal gait  Skin: color and texture normal, no jaundice, no rashes or lesions  Psychiatric: alert and oriented, normal affect and behavior                                                                    "

## 2024-06-11 RX ORDER — CETIRIZINE HYDROCHLORIDE 10 MG/1
10 TABLET ORAL DAILY
Qty: 90 TABLET | Refills: 1 | Status: SHIPPED | OUTPATIENT
Start: 2024-06-11

## 2024-07-09 DIAGNOSIS — E66.01 CLASS 3 SEVERE OBESITY DUE TO EXCESS CALORIES WITH SERIOUS COMORBIDITY AND BODY MASS INDEX (BMI) OF 40.0 TO 44.9 IN ADULT (HCC): ICD-10-CM

## 2024-07-09 DIAGNOSIS — J01.90 ACUTE RHINOSINUSITIS: ICD-10-CM

## 2024-07-09 RX ORDER — CETIRIZINE HYDROCHLORIDE 10 MG/1
10 TABLET ORAL DAILY
Qty: 90 TABLET | Refills: 1 | Status: SHIPPED | OUTPATIENT
Start: 2024-07-09

## 2024-07-09 RX ORDER — PHENTERMINE HYDROCHLORIDE 30 MG/1
30 CAPSULE ORAL EVERY MORNING
Qty: 30 CAPSULE | Refills: 0 | Status: SHIPPED | OUTPATIENT
Start: 2024-07-09 | End: 2024-07-16

## 2024-07-16 ENCOUNTER — OFFICE VISIT (OUTPATIENT)
Dept: FAMILY MEDICINE CLINIC | Facility: CLINIC | Age: 29
End: 2024-07-16
Payer: COMMERCIAL

## 2024-07-16 VITALS
RESPIRATION RATE: 18 BRPM | OXYGEN SATURATION: 98 % | WEIGHT: 224 LBS | HEART RATE: 93 BPM | SYSTOLIC BLOOD PRESSURE: 110 MMHG | BODY MASS INDEX: 36 KG/M2 | HEIGHT: 66 IN | TEMPERATURE: 98.8 F | DIASTOLIC BLOOD PRESSURE: 84 MMHG

## 2024-07-16 DIAGNOSIS — E66.01 CLASS 3 SEVERE OBESITY DUE TO EXCESS CALORIES WITH SERIOUS COMORBIDITY AND BODY MASS INDEX (BMI) OF 40.0 TO 44.9 IN ADULT (HCC): ICD-10-CM

## 2024-07-16 DIAGNOSIS — R41.840 DIFFICULTY CONCENTRATING: Primary | ICD-10-CM

## 2024-07-16 PROCEDURE — 99214 OFFICE O/P EST MOD 30 MIN: CPT | Performed by: FAMILY MEDICINE

## 2024-07-16 RX ORDER — TOPIRAMATE 25 MG/1
25 TABLET ORAL 2 TIMES DAILY
Qty: 60 TABLET | Refills: 2 | Status: SHIPPED | OUTPATIENT
Start: 2024-07-16

## 2024-07-16 RX ORDER — PHENTERMINE HYDROCHLORIDE 30 MG/1
30 CAPSULE ORAL EVERY MORNING
Qty: 30 CAPSULE | Refills: 0 | Status: SHIPPED | OUTPATIENT
Start: 2024-07-16

## 2024-07-16 NOTE — PROGRESS NOTES
Subjective:      Patient ID: Vickie Stock is a 29 y.o. female.    Here for follow-up on weight loss.  Patient states that she has not been following the diet and exercise for the past month.  At last visit the weight was 224 LB's and today is also 224 LB's.  She is not interested in injectable medications at this time.  Would like to continue phentermine.  She has started seeing a therapist and states that the therapist and herself both feel that she has ADHD and that is why her symptoms are better on phentermine.  Overall feels overwhelmed as she does have a toddler and feels that she cannot get everything done that she would like to do because she lacks focus.        Past Medical History:   Diagnosis Date    Chronic back pain     Colitis     History of transfusion     Multiple abrasions 2013    Proteinuria     Shoulder disorder     Urinary tract infection     pylonephritis       Family History   Problem Relation Age of Onset    Hypertension Mother     Arthritis Mother     Depression Mother     COPD Mother     Gestational diabetes Mother     COPD Father     Depression Sister     Anxiety disorder Sister     Cholelithiasis Sister     Depression Brother     Bipolar disorder Brother     Depression Brother     Depression Brother     No Known Problems Maternal Grandmother     COPD Maternal Grandfather     Cancer Maternal Grandfather     Lung cancer Paternal Grandmother     Cancer Paternal Grandmother     Kidney cancer Paternal Grandmother     Heart disease Paternal Grandfather     Skin cancer Maternal Uncle        Past Surgical History:   Procedure Laterality Date    COLONOSCOPY      DILATION AND CURETTAGE OF UTERUS N/A 08/23/2021    Procedure: DILATATION AND CURETTAGE (D&C);  Surgeon: Rona Puga MD;  Location: [facility] ;  Service: Obstetrics    EXAMINATION UNDER ANESTHESIA N/A 08/23/2021    Procedure: EXAM UNDER ANESTHESIA (EUA) WITH INSERTION OF BAKRI BALLOON;  Surgeon: Rona Puga MD;   Location: [facility] ;  Service: Obstetrics    TONSILLECTOMY  2009    UPPER GASTROINTESTINAL ENDOSCOPY      WISDOM TOOTH EXTRACTION          reports that she quit smoking about 3 years ago. Her smoking use included cigarettes. She has never used smokeless tobacco. She reports that she does not currently use alcohol. She reports that she does not currently use drugs after having used the following drugs: Marijuana.      Current Outpatient Medications:     cetirizine (ZyrTEC) 10 mg tablet, Take 1 tablet (10 mg total) by mouth daily, Disp: 90 tablet, Rfl: 1    ergocalciferol (ERGOCALCIFEROL) 1.25 MG (76628 UT) capsule, Take 1 capsule (50,000 Units total) by mouth once a week, Disp: 12 capsule, Rfl: 0    Ferrous Sulfate (IRON PO), Take 1 tablet every day by oral route., Disp: , Rfl:     fluticasone (FLONASE) 50 mcg/act nasal spray, 1 spray into each nostril daily, Disp: 48 mL, Rfl: 0    ketoconazole (NIZORAL) 2 % shampoo, as needed, Disp: , Rfl:     pantoprazole (PROTONIX) 40 mg tablet, Take 1 tablet (40 mg total) by mouth daily, Disp: 90 tablet, Rfl: 1    phentermine 30 MG capsule, Take 1 capsule (30 mg total) by mouth every morning, Disp: 30 capsule, Rfl: 0    topiramate (TOPAMAX) 25 mg tablet, Take 1 tablet (25 mg total) by mouth 2 (two) times a day, Disp: 60 tablet, Rfl: 2    The following portions of the patient's history were reviewed and updated as appropriate: allergies, current medications, past family history, past medical history, past social history, past surgical history and problem list.    Review of Systems   Constitutional:  Negative for fatigue and fever.   HENT:  Negative for congestion, facial swelling, mouth sores, rhinorrhea, sore throat and trouble swallowing.    Eyes:  Negative for pain and redness.   Respiratory:  Negative for cough, shortness of breath and wheezing.    Cardiovascular:  Negative for chest pain, palpitations and leg swelling.   Gastrointestinal:  Negative for abdominal pain, blood in  "stool, constipation, diarrhea and nausea.   Genitourinary:  Negative for dysuria, hematuria and urgency.   Musculoskeletal:  Negative for arthralgias, back pain and myalgias.   Skin:  Negative for rash and wound.   Neurological:  Negative for seizures, syncope and headaches.   Hematological:  Negative for adenopathy.   Psychiatric/Behavioral:  Positive for decreased concentration. Negative for agitation, behavioral problems, self-injury, sleep disturbance and suicidal ideas. The patient is hyperactive.          PHQ-2/9 Depression Screening    Little interest or pleasure in doing things: 0 - not at all  Feeling down, depressed, or hopeless: 0 - not at all  Trouble falling or staying asleep, or sleeping too much: 0 - not at all  Feeling tired or having little energy: 0 - not at all  Poor appetite or overeatin - not at all  Feeling bad about yourself - or that you are a failure or have let yourself or your family down: 0 - not at all  Trouble concentrating on things, such as reading the newspaper or watching television: 0 - not at all  Moving or speaking so slowly that other people could have noticed. Or the opposite - being so fidgety or restless that you have been moving around a lot more than usual: 0 - not at all  Thoughts that you would be better off dead, or of hurting yourself in some way: 0 - not at all  PHQ-9 Score: 0  PHQ-9 Interpretation: No or Minimal depression             Objective:    /84 (BP Location: Left arm, Patient Position: Sitting, Cuff Size: Adult)   Pulse 93   Temp 98.8 °F (37.1 °C) (Tympanic)   Resp 18   Ht 5' 6\" (1.676 m)   Wt 102 kg (224 lb)   SpO2 98%   BMI 36.15 kg/m²      Physical Exam  Vitals and nursing note reviewed.   Constitutional:       Appearance: Normal appearance. She is well-developed. She is not ill-appearing.   HENT:      Head: Normocephalic and atraumatic.      Right Ear: External ear normal.      Left Ear: External ear normal.      Nose: Nose normal.      " Mouth/Throat:      Mouth: Mucous membranes are moist.      Pharynx: No oropharyngeal exudate or posterior oropharyngeal erythema.   Eyes:      General: No scleral icterus.        Right eye: No discharge.         Left eye: No discharge.      Conjunctiva/sclera: Conjunctivae normal.   Cardiovascular:      Rate and Rhythm: Normal rate.      Heart sounds: No murmur heard.     No gallop.   Pulmonary:      Effort: Pulmonary effort is normal. No respiratory distress.      Breath sounds: Normal breath sounds. No stridor. No wheezing, rhonchi or rales.   Abdominal:      Palpations: Abdomen is soft.      Tenderness: There is no abdominal tenderness.   Musculoskeletal:         General: No tenderness or deformity.   Skin:     Findings: No erythema or rash.   Neurological:      Mental Status: She is alert. Mental status is at baseline.   Psychiatric:         Behavior: Behavior normal.         Judgment: Judgment normal.           Recent Results (from the past 8736 hour(s))   POCT pregnancy, urine    Collection Time: 09/06/23 10:43 AM   Result Value Ref Range    EXT Preg Test, Ur Negative Negative    Control Valid Valid   Tissue Exam    Collection Time: 09/06/23 11:04 AM   Result Value Ref Range    Case Report       Surgical Pathology Report                         Case: [id number]                                   Authorizing Provider:  Rajeev Lechuga MD       Collected:           09/06/2023 9660              Ordering Location:     [facility]   Received:            09/06/2023 1420                                     Center                                                                       Pathologist:           Chuck Betts MD                                                          Specimens:   A) - Duodenum, duodenum bx r/o celiac                                                               B) - Stomach, gastric bx r/o h pylori                                                               C) - Esophagus,  lower esophagus r/o eoe                                                             D) - Esophagus, mid esophagus r/o eoe                                                               E) - Large Intestine, Cecum, cecal bx                                                                F) - Large Intestine, Right/Ascending Colon, ascending colon bx                                     G) - Large Intestine, Transverse Colon, transverse colon bx                                         H) - Large Intestine, Left/Descending Colon, descending colon bx                                    I) - Large Intestine, Sigmoid Colon, sigmoid colon bx                                               J) - Rectum, rectum bx r/o crohns                                                          Final Diagnosis       A. Duodenum, biopsy:  -   Duodenal mucosa with normal villous architecture and no significant histopathologic change.  -   No evidence of celiac disease.    B. Stomach, biopsy:  -   Gastric antral and oxyntic mucosa with chronic inactive gastritis.   -   Gastric  oxyntic mucosa with no significant histopathologic change.   -   Negative for intestinal metaplasia and dysplasia.  -   Helicobacter pylori immunostain is negative.    C. Esophagus, lower, biopsy:  -   Squamous mucosa with basal layer hyperplasia, spongiosis, elongated papillae, and few intraepithelial eosinophils suggestive of reflux esophagitis.     D. Esophagus, mid, biopsy:  -   Squamous mucosa with mild reactive changes and rare eosinophils.  -   No evidence of eosinophilic esophagitis.    E. Large Intestine, Cecum, biopsy:  -   Colonic mucosa with expanded expanded lymphoid aggregates, favor reactive, see comment.  -   No evidence of acute, chronic, or microscopic colitis.    F. Large Intestine, ascending colon, biopsy:  -   Colonic mucosa with lamina propria expansion by a lymphoplasmacytic infiltrate with expanded lymphoid aggregates.   -   No evidence of acute or  microscopic colitis.    G. Large Intestine, Transverse Colon, biopsy:  -   Colonic mucosa with no significant histopathologic change.  -   No evidence of acute, chronic, or microscopic colitis.     H. Large Intestine, descending colon, biopsy:  -   Colonic mucosa with no significant histopathologic change.  -   No evidence of acute, chronic, or microscopic colitis.    I. Large Intestine, Sigmoid Colon, biopsy:  -   Colonic mucosa with no significant histopathologic change.  -   No evidence of acute, chronic, or microscopic colitis.     J. Rectum, biopsy:  -   Colorectal mucosa with no significant histopathologic change.  -   No evidence of acute, chronic, or microscopic colitis.     Comment: The patient's presentation of hematochezia, loose stools, and abnormal serology testing are noted. The current biopsy series shows focal changes of expanded lymphoplasmacytic infiltrate and expanded lymphoid aggregates. Immunohistochemical stains for lymphoproliferative disorder are performed and within normal limits at this time. There is no evidence of acute colitis or well-developed chronic colitis in the reviewed material. Recommend continued clinical follow-up for the patient symptoms; if the patient's expanded lymphoid aggregates persists further work-up may be performed at a later date. Clinical and endoscopic correlation is advised.       Note       Immunohistochemical stains performed on part E show:  CD20 highlights B cells, which are expanded but concentrated in follicles.  CD3 and CD5 stains highlight increased T cells that are predominantly in the interfollicular areas.  BCL6 and CD10 label germinal centers which are appropriately negative for BCL2.  BCL-1 is negative.  Ki-67 proliferative index is appropriately high in germinal centers.      Additional Information       All reported additional testing was performed with appropriately reactive controls.  These tests were developed and their performance  "characteristics determined by [facility] or appropriate performing facility, though some tests may be performed on tissues which have not been validated for performance characteristics (such as staining performed on alcohol exposed cell blocks and decalcified tissues).  Results should be interpreted with caution and in the context of the patients’ clinical condition. These tests may not be cleared or approved by the U.S. Food and Drug Administration, though the FDA has determined that such clearance or approval is not necessary. These tests are used for clinical purposes and they should not be regarded as investigational or for research. This laboratory has been approved by CLIA 88, designated as a high-complexity laboratory and is qualified to perform these tests.  .Interpretation performed at [facility], [address] [facility] [address]        Gross Description       A. The specimen is received in formalin, labeled with the patient's name and hospital number, and is designated \" duodenum biopsy rule out celiac\".  The specimen consists of multiple tan friable soft tissue fragments measuring 1.1 x 0.6 x 0.2 cm in aggregate.  Entirely submitted. Screened cassette.  B. The specimen is received in formalin, labeled with the patient's name and hospital number, and is designated \" gastric biopsy rule out H. pylori\".  The specimen consists of 2 tan friable soft tissue fragments, each measuring 0.4 cm in greatest dimension.  Entirely submitted. Screened cassette.  C. The specimen is received in formalin, labeled with the patient's name and hospital number, and is designated \" lower esophagus rule out EOE\".  The specimen consists of 2 colorless to tan friable soft tissue fragments ranging from 0.3 to 0.5 cm in greatest dimension.  Entirely submitted. Screened cassette.  D. The specimen is received in formalin, labeled with the patient's name and hospital number, and is designated \" mid esophagus " "rule out EOE\".  The specimen consists of 2 colorless to tan friable soft tissue fragments ranging from 0.4 to 0.5 cm in greatest dimension.  Entirely submitted. Screened cassette.  E. The specimen is received in formalin, labeled with the patient's name and hospital number, and is designated \" cecal biopsy\".  The specimen consists of 3 tan friable soft tissue fragments ranging from 0.3 to 0.4 cm in greatest dimension.  Entirely submitted. Screened cassette.  F. The specimen is received in formalin, labeled with the patient's name and hospital number, and is designated \" ascending colon biopsy\".  The specimen consists of multiple tan friable soft tissue fragments measuring 1.0 x 0.4 x 0.2 cm in aggregate.  Entirely submitted. Screened cassette.  G. The specimen is received in formalin, labeled with the patient's name and hospital number, and is designated \" transverse colon biopsy\".  The specimen consists of 3 tan friable soft tissue fragments ranging from 0.3 to 0.5 cm in greatest dimension.  Entirely submitted. Screened cassette.  H. The specimen is received in formalin, labeled with the patient's name and hospital number, and is designated \" descending colon biopsy\".  The specimen consists of 2 tan friable soft tissue fragments, each measuring 0.4 cm in greatest dimension.  Entirely submitted. Screened cassette.  I. The specimen is received in formalin, labeled with the patient's name and hospital number, and is designated \" sigmoid colon biopsy\".  The specimen consists of 2 tan friable soft tissue fragments, each measuring 0.4 cm in greatest dimension.  Entirely submitted. Screened cassette.  J. The specimen is received in formalin, labeled with the patient's name and hospital number, and is designated \" rectum biopsy rule out Crohn's\".  The specimen consists of 3 tan friable soft tissue fragments, each measuring 0.3 cm in greatest dimension.  Entirely submitted. Screened cassette.    Note: The estimated total " formalin fixation time based upon information provided by the submitting clinician and the standard processing schedule is under 72 hours.    [facility]      Clinical Information       History of loose stools, hematochezia, positive IBD panel and iron deficiency anemia-raising the possibility of undiagnosed inflammatory bowel disease given family history of ulcerative colitis in mother.   POCT Rapid Covid Ag    Collection Time: 10/17/23  5:05 PM   Result Value Ref Range    POCT SARS-CoV-2 Ag Negative Negative    VALID CONTROL Valid    CBC and Platelet    Collection Time: 12/26/23  3:20 PM   Result Value Ref Range    WBC 12.34 (H) 4.31 - 10.16 Thousand/uL    RBC 5.07 3.81 - 5.12 Million/uL    Hemoglobin 13.4 11.5 - 15.4 g/dL    Hematocrit 41.5 34.8 - 46.1 %    MCV 82 82 - 98 fL    MCH 26.4 (L) 26.8 - 34.3 pg    MCHC 32.3 31.4 - 37.4 g/dL    RDW 14.2 11.6 - 15.1 %    Platelets 312 149 - 390 Thousands/uL    MPV 11.8 8.9 - 12.7 fL   Comprehensive metabolic panel    Collection Time: 12/26/23  3:20 PM   Result Value Ref Range    Sodium 138 135 - 147 mmol/L    Potassium 3.9 3.5 - 5.3 mmol/L    Chloride 104 96 - 108 mmol/L    CO2 26 21 - 32 mmol/L    ANION GAP 8 mmol/L    BUN 14 5 - 25 mg/dL    Creatinine 0.90 0.60 - 1.30 mg/dL    Glucose 87 65 - 140 mg/dL    Calcium 9.3 8.4 - 10.2 mg/dL    AST 13 13 - 39 U/L    ALT 19 7 - 52 U/L    Alkaline Phosphatase 117 (H) 34 - 104 U/L    Total Protein 7.5 6.4 - 8.4 g/dL    Albumin 4.4 3.5 - 5.0 g/dL    Total Bilirubin 0.20 0.20 - 1.00 mg/dL    eGFR 88 ml/min/1.73sq m   Hepatitis B surface antibody    Collection Time: 12/26/23  3:20 PM   Result Value Ref Range    Hep B S Ab 4.47 3-500 mIU/mL mIU/mL   Chronic Hepatitis Panel    Collection Time: 12/26/23  3:20 PM   Result Value Ref Range    Hepatitis B Surface Ag Non-reactive Non-Reactive    Hepatitis C Ab Non-reactive Non-Reactive    Hep B C IgM Non-reactive Non-Reactive    Hep B Core Total Ab Non-reactive Non-Reactive   Quantiferon TB  Gold Plus Gray    Collection Time: 12/26/23  3:20 PM   Result Value Ref Range    QFT Nil 0.01 0 - 8.0 IU/ml   Quantiferon TB Gold Plus Green    Collection Time: 12/26/23  3:20 PM   Result Value Ref Range    QFT TB1-NIL 0.00 IU/ml   Quantiferon TB Gold Plus Yellow    Collection Time: 12/26/23  3:20 PM   Result Value Ref Range    QFT TB2-NIL 0.00 IU/ml   Quantiferon TB Gold Plus Purple    Collection Time: 12/26/23  3:20 PM   Result Value Ref Range    QFT Mitogen-NIL 7.27 IU/ml    QFT Final Interpretation Negative Negative   Comprehensive metabolic panel    Collection Time: 01/08/24  4:22 PM   Result Value Ref Range    Sodium 133 (L) 135 - 147 mmol/L    Potassium 4.2 3.5 - 5.3 mmol/L    Chloride 101 96 - 108 mmol/L    CO2 23 21 - 32 mmol/L    ANION GAP 9 mmol/L    BUN 19 5 - 25 mg/dL    Creatinine 0.81 0.60 - 1.30 mg/dL    Glucose 98 65 - 140 mg/dL    Calcium 9.1 8.4 - 10.2 mg/dL    AST 10 (L) 13 - 39 U/L    ALT 15 7 - 52 U/L    Alkaline Phosphatase 106 (H) 34 - 104 U/L    Total Protein 7.3 6.4 - 8.4 g/dL    Albumin 4.3 3.5 - 5.0 g/dL    Total Bilirubin 0.26 0.20 - 1.00 mg/dL    eGFR 100 ml/min/1.73sq m   CBC and Platelet    Collection Time: 01/08/24  4:22 PM   Result Value Ref Range    WBC 13.23 (H) 4.31 - 10.16 Thousand/uL    RBC 5.27 (H) 3.81 - 5.12 Million/uL    Hemoglobin 13.7 11.5 - 15.4 g/dL    Hematocrit 43.2 34.8 - 46.1 %    MCV 82 82 - 98 fL    MCH 26.0 (L) 26.8 - 34.3 pg    MCHC 31.7 31.4 - 37.4 g/dL    RDW 14.6 11.6 - 15.1 %    Platelets 292 149 - 390 Thousands/uL    MPV 11.6 8.9 - 12.7 fL   C-reactive protein    Collection Time: 01/08/24  4:22 PM   Result Value Ref Range    CRP 14.0 (H) <3.0 mg/L   Vitamin D 25 hydroxy    Collection Time: 01/08/24  4:22 PM   Result Value Ref Range    Vit D, 25-Hydroxy 12.4 (L) 30.0 - 100.0 ng/mL   POCT rapid PCR strepA    Collection Time: 04/10/24  5:46 PM   Result Value Ref Range    RAPID PCR STREP A Not Detected Not Detected       Laboratory Results: I have personally  "reviewed the pertinent laboratory results/reports         Assessment/Plan:  1. Difficulty concentrating  -     Ambulatory referral to Psych Services; Future  2. Class 3 severe obesity due to excess calories with serious comorbidity and body mass index (BMI) of 40.0 to 44.9 in adult (HCC)  -     phentermine 30 MG capsule; Take 1 capsule (30 mg total) by mouth every morning  -     topiramate (TOPAMAX) 25 mg tablet; Take 1 tablet (25 mg total) by mouth 2 (two) times a day      I have advised her to follow-up with psychiatry, advised her therapist to send me a note with the diagnosis.  Also recommend starting either Zepbound or Wegovy.  Patient wants to hold off at this time and would like to try another month of phentermine.  This will be her month 7 with phentermine topiramate.  Discussed that she should use it no more than 12 months and her entire lifetime given the increased risk of IPF.  She verbalized understanding.  PDMP reviewed.  Discussed 64 ounces of water intake, 1500-calorie diet using my fitness pal sedrick tracker, 150 minutes of exercise per week.             Read package inserts for all medications before starting a new medications, call me if you have any questions.    Patient was given opportunity to ask questions and all questions were answered.    Disclaimer: Portions of the record may have been created with voice recognition software. Occasional wrong word or \"sound a like\" substitutions may have occurred due to the inherent limitations of voice recognition software. Read the chart carefully and recognize, using context, where substitutions have occurred. I have used the Epic copy/forward function to compose this note. I have reviewed my current note to ensure it reflects the current patient status, exam, assessment and plan.    "

## 2024-07-21 DIAGNOSIS — K50.019 CROHN'S DISEASE OF SMALL INTESTINE WITH COMPLICATION (HCC): Primary | ICD-10-CM

## 2024-07-30 ENCOUNTER — TELEPHONE (OUTPATIENT)
Age: 29
End: 2024-07-30

## 2024-07-30 NOTE — TELEPHONE ENCOUNTER
[facility] - [facility] called to get office fax to Dr Butler office. Sun will be faxing over three reports to [phone]

## 2024-08-14 DIAGNOSIS — J01.90 ACUTE RHINOSINUSITIS: ICD-10-CM

## 2024-08-15 RX ORDER — CETIRIZINE HYDROCHLORIDE 10 MG/1
10 TABLET ORAL DAILY
Qty: 90 TABLET | Refills: 1 | Status: SHIPPED | OUTPATIENT
Start: 2024-08-15

## 2024-08-20 ENCOUNTER — TELEPHONE (OUTPATIENT)
Age: 29
End: 2024-08-20

## 2024-08-20 NOTE — TELEPHONE ENCOUNTER
Zeke,  from [facility] called in to provide her contact info to the office if we ever needed anything in regards to pt. Her direct number is [phone].

## 2024-08-21 ENCOUNTER — OFFICE VISIT (OUTPATIENT)
Dept: FAMILY MEDICINE CLINIC | Facility: CLINIC | Age: 29
End: 2024-08-21
Payer: COMMERCIAL

## 2024-08-21 VITALS
BODY MASS INDEX: 34.23 KG/M2 | TEMPERATURE: 98 F | SYSTOLIC BLOOD PRESSURE: 102 MMHG | OXYGEN SATURATION: 99 % | DIASTOLIC BLOOD PRESSURE: 62 MMHG | HEIGHT: 66 IN | WEIGHT: 213 LBS | RESPIRATION RATE: 16 BRPM | HEART RATE: 93 BPM

## 2024-08-21 DIAGNOSIS — F41.9 ANXIETY AND DEPRESSION: ICD-10-CM

## 2024-08-21 DIAGNOSIS — K50.019 CROHN'S DISEASE OF SMALL INTESTINE WITH COMPLICATION (HCC): ICD-10-CM

## 2024-08-21 DIAGNOSIS — F33.2 SEVERE EPISODE OF RECURRENT MAJOR DEPRESSIVE DISORDER, WITHOUT PSYCHOTIC FEATURES (HCC): Primary | ICD-10-CM

## 2024-08-21 DIAGNOSIS — F32.A ANXIETY AND DEPRESSION: ICD-10-CM

## 2024-08-21 DIAGNOSIS — E66.01 CLASS 3 SEVERE OBESITY DUE TO EXCESS CALORIES WITH SERIOUS COMORBIDITY AND BODY MASS INDEX (BMI) OF 40.0 TO 44.9 IN ADULT (HCC): ICD-10-CM

## 2024-08-21 PROBLEM — O99.013 ANEMIA DURING PREGNANCY IN THIRD TRIMESTER: Status: RESOLVED | Noted: 2021-07-08 | Resolved: 2024-08-21

## 2024-08-21 PROCEDURE — 99214 OFFICE O/P EST MOD 30 MIN: CPT | Performed by: FAMILY MEDICINE

## 2024-08-21 RX ORDER — TOPIRAMATE 25 MG/1
25 TABLET, FILM COATED ORAL 2 TIMES DAILY
Qty: 60 TABLET | Refills: 2 | Status: SHIPPED | OUTPATIENT
Start: 2024-08-21

## 2024-08-21 RX ORDER — ESCITALOPRAM OXALATE 10 MG/1
10 TABLET ORAL DAILY
Qty: 30 TABLET | Refills: 2 | Status: SHIPPED | OUTPATIENT
Start: 2024-08-21 | End: 2025-02-17

## 2024-08-21 RX ORDER — PHENTERMINE HYDROCHLORIDE 30 MG/1
30 CAPSULE ORAL EVERY MORNING
Qty: 90 CAPSULE | Refills: 0 | Status: SHIPPED | OUTPATIENT
Start: 2024-08-21

## 2024-08-21 NOTE — PROGRESS NOTES
Subjective:      Patient ID: Vickie Stock is a 29 y.o. female.    Obesity- 224 lbs->213 lbs today  Has 3 yo and relationship problem, commutes to work  Also has been seeing therapist, still unable to get psych appointment          Past Medical History:   Diagnosis Date    Chronic back pain     Colitis     History of transfusion     Multiple abrasions 2013    Proteinuria     Shoulder disorder     Urinary tract infection     pylonephritis       Family History   Problem Relation Age of Onset    Hypertension Mother     Arthritis Mother     Depression Mother     COPD Mother     Gestational diabetes Mother     COPD Father     Depression Sister     Anxiety disorder Sister     Cholelithiasis Sister     Depression Brother     Bipolar disorder Brother     Depression Brother     Depression Brother     No Known Problems Maternal Grandmother     COPD Maternal Grandfather     Cancer Maternal Grandfather     Lung cancer Paternal Grandmother     Cancer Paternal Grandmother     Kidney cancer Paternal Grandmother     Heart disease Paternal Grandfather     Skin cancer Maternal Uncle        Past Surgical History:   Procedure Laterality Date    COLONOSCOPY      DILATION AND CURETTAGE OF UTERUS N/A 08/23/2021    Procedure: DILATATION AND CURETTAGE (D&C);  Surgeon: Rona Puga MD;  [facility];  Service: Obstetrics    EXAMINATION UNDER ANESTHESIA N/A 08/23/2021    Procedure: EXAM UNDER ANESTHESIA (EUA) WITH INSERTION OF BAKRI BALLOON;  Surgeon: Rona Puga MD;  [facility];  Service: Obstetrics    TONSILLECTOMY  2009    UPPER GASTROINTESTINAL ENDOSCOPY      WISDOM TOOTH EXTRACTION          reports that she quit smoking about 3 years ago. Her smoking use included cigarettes. She has never used smokeless tobacco. She reports that she does not currently use alcohol. She reports that she does not currently use drugs after having used the following drugs: Marijuana.      Current Outpatient Medications:      cetirizine (ZyrTEC) 10 mg tablet, Take 1 tablet (10 mg total) by mouth daily, Disp: 90 tablet, Rfl: 1    ergocalciferol (ERGOCALCIFEROL) 1.25 MG (77118 UT) capsule, Take 1 capsule (50,000 Units total) by mouth once a week, Disp: 12 capsule, Rfl: 0    escitalopram (LEXAPRO) 10 mg tablet, Take 1 tablet (10 mg total) by mouth daily, Disp: 30 tablet, Rfl: 2    Ferrous Sulfate (IRON PO), Take 1 tablet every day by oral route., Disp: , Rfl:     fluticasone (FLONASE) 50 mcg/act nasal spray, 1 spray into each nostril daily, Disp: 48 mL, Rfl: 0    ketoconazole (NIZORAL) 2 % shampoo, as needed, Disp: , Rfl:     pantoprazole (PROTONIX) 40 mg tablet, Take 1 tablet (40 mg total) by mouth daily, Disp: 90 tablet, Rfl: 1    phentermine 30 MG capsule, Take 1 capsule (30 mg total) by mouth every morning, Disp: 90 capsule, Rfl: 0    topiramate (TOPAMAX) 25 mg tablet, Take 1 tablet (25 mg total) by mouth 2 (two) times a day, Disp: 60 tablet, Rfl: 2    The following portions of the patient's history were reviewed and updated as appropriate: allergies, current medications, past family history, past medical history, past social history, past surgical history and problem list.    Review of Systems   Constitutional:  Negative for fatigue and fever.   HENT:  Negative for congestion, facial swelling, mouth sores, rhinorrhea, sore throat and trouble swallowing.    Eyes:  Negative for pain and redness.   Respiratory:  Negative for cough, shortness of breath and wheezing.    Cardiovascular:  Negative for chest pain, palpitations and leg swelling.   Gastrointestinal:  Negative for abdominal pain, blood in stool, constipation, diarrhea and nausea.   Genitourinary:  Negative for dysuria, hematuria and urgency.   Musculoskeletal:  Negative for arthralgias, back pain and myalgias.   Skin:  Negative for rash and wound.   Neurological:  Negative for seizures, syncope and headaches.   Hematological:  Negative for adenopathy.   Psychiatric/Behavioral:   "Positive for decreased concentration and sleep disturbance. Negative for agitation and behavioral problems.          PHQ-2/9 Depression Screening    Little interest or pleasure in doing things: 3 - nearly every day  Feeling down, depressed, or hopeless: 3 - nearly every day  Trouble falling or staying asleep, or sleeping too much: 3 - nearly every day  Feeling tired or having little energy: 3 - nearly every day  Poor appetite or overeatin - several days  Feeling bad about yourself - or that you are a failure or have let yourself or your family down: 1 - several days  Trouble concentrating on things, such as reading the newspaper or watching television: 3 - nearly every day  Moving or speaking so slowly that other people could have noticed. Or the opposite - being so fidgety or restless that you have been moving around a lot more than usual: 3 - nearly every day  Thoughts that you would be better off dead, or of hurting yourself in some way: 1 - several days  PHQ-9 Score: 21  PHQ-9 Interpretation: Severe depression             Objective:    /62 (BP Location: Right arm, Patient Position: Sitting, Cuff Size: Adult)   Pulse 93   Temp 98 °F (36.7 °C) (Tympanic)   Resp 16   Ht 5' 6\" (1.676 m)   Wt 96.6 kg (213 lb)   SpO2 99%   BMI 34.38 kg/m²      Physical Exam  Vitals and nursing note reviewed.   Constitutional:       Appearance: Normal appearance. She is well-developed. She is obese. She is not ill-appearing.   HENT:      Head: Normocephalic and atraumatic.      Right Ear: External ear normal.      Left Ear: External ear normal.      Nose: Nose normal.      Mouth/Throat:      Mouth: Mucous membranes are moist.      Pharynx: No oropharyngeal exudate or posterior oropharyngeal erythema.   Eyes:      General: No scleral icterus.        Right eye: No discharge.         Left eye: No discharge.      Conjunctiva/sclera: Conjunctivae normal.   Cardiovascular:      Rate and Rhythm: Normal rate.      Heart " sounds: No murmur heard.     No gallop.   Pulmonary:      Effort: Pulmonary effort is normal. No respiratory distress.      Breath sounds: Normal breath sounds. No stridor. No wheezing, rhonchi or rales.   Abdominal:      Palpations: Abdomen is soft.      Tenderness: There is no abdominal tenderness.   Musculoskeletal:         General: No tenderness or deformity.   Skin:     Findings: No erythema or rash.   Neurological:      Mental Status: She is alert. Mental status is at baseline.   Psychiatric:         Behavior: Behavior normal.         Judgment: Judgment normal.           Recent Results (from the past 8736 hour(s))   POCT pregnancy, urine    Collection Time: 09/06/23 10:43 AM   Result Value Ref Range    EXT Preg Test, Ur Negative Negative    Control Valid Valid   Tissue Exam    Collection Time: 09/06/23 11:04 AM   Result Value Ref Range    Case Report       Surgical Pathology Report                         Case: [id number]                                   Authorizing Provider:  Rajeev Lechuga MD       Collected:           09/06/2023 1104              Ordering Location:     [facility]   Received:            09/06/2023 1420                                     Center                                                                       Pathologist:           Chuck Betts MD                                                          Specimens:   A) - Duodenum, duodenum bx r/o celiac                                                               B) - Stomach, gastric bx r/o h pylori                                                               C) - Esophagus, lower esophagus r/o eoe                                                             D) - Esophagus, mid esophagus r/o eoe                                                               E) - Large Intestine, Cecum, cecal bx                                                                F) - Large Intestine, Right/Ascending Colon, ascending colon  bx                                     G) - Large Intestine, Transverse Colon, transverse colon bx                                         H) - Large Intestine, Left/Descending Colon, descending colon bx                                    I) - Large Intestine, Sigmoid Colon, sigmoid colon bx                                               J) - Rectum, rectum bx r/o crohns                                                          Final Diagnosis       A. Duodenum, biopsy:  -   Duodenal mucosa with normal villous architecture and no significant histopathologic change.  -   No evidence of celiac disease.    B. Stomach, biopsy:  -   Gastric antral and oxyntic mucosa with chronic inactive gastritis.   -   Gastric  oxyntic mucosa with no significant histopathologic change.   -   Negative for intestinal metaplasia and dysplasia.  -   Helicobacter pylori immunostain is negative.    C. Esophagus, lower, biopsy:  -   Squamous mucosa with basal layer hyperplasia, spongiosis, elongated papillae, and few intraepithelial eosinophils suggestive of reflux esophagitis.     D. Esophagus, mid, biopsy:  -   Squamous mucosa with mild reactive changes and rare eosinophils.  -   No evidence of eosinophilic esophagitis.    E. Large Intestine, Cecum, biopsy:  -   Colonic mucosa with expanded expanded lymphoid aggregates, favor reactive, see comment.  -   No evidence of acute, chronic, or microscopic colitis.    F. Large Intestine, ascending colon, biopsy:  -   Colonic mucosa with lamina propria expansion by a lymphoplasmacytic infiltrate with expanded lymphoid aggregates.   -   No evidence of acute or microscopic colitis.    G. Large Intestine, Transverse Colon, biopsy:  -   Colonic mucosa with no significant histopathologic change.  -   No evidence of acute, chronic, or microscopic colitis.     H. Large Intestine, descending colon, biopsy:  -   Colonic mucosa with no significant histopathologic change.  -   No evidence of acute, chronic, or  microscopic colitis.    I. Large Intestine, Sigmoid Colon, biopsy:  -   Colonic mucosa with no significant histopathologic change.  -   No evidence of acute, chronic, or microscopic colitis.     J. Rectum, biopsy:  -   Colorectal mucosa with no significant histopathologic change.  -   No evidence of acute, chronic, or microscopic colitis.     Comment: The patient's presentation of hematochezia, loose stools, and abnormal serology testing are noted. The current biopsy series shows focal changes of expanded lymphoplasmacytic infiltrate and expanded lymphoid aggregates. Immunohistochemical stains for lymphoproliferative disorder are performed and within normal limits at this time. There is no evidence of acute colitis or well-developed chronic colitis in the reviewed material. Recommend continued clinical follow-up for the patient symptoms; if the patient's expanded lymphoid aggregates persists further work-up may be performed at a later date. Clinical and endoscopic correlation is advised.       Note       Immunohistochemical stains performed on part E show:  CD20 highlights B cells, which are expanded but concentrated in follicles.  CD3 and CD5 stains highlight increased T cells that are predominantly in the interfollicular areas.  BCL6 and CD10 label germinal centers which are appropriately negative for BCL2.  BCL-1 is negative.  Ki-67 proliferative index is appropriately high in germinal centers.      Additional Information       All reported additional testing was performed with appropriately reactive controls.  These tests were developed and their performance characteristics determined by [facility] or appropriate performing facility, though some tests may be performed on tissues which have not been validated for performance characteristics (such as staining performed on alcohol exposed cell blocks and decalcified tissues).  Results should be interpreted with caution and in the context of the  "patients’ clinical condition. These tests may not be cleared or approved by the U.S. Food and Drug Administration, though the FDA has determined that such clearance or approval is not necessary. These tests are used for clinical purposes and they should not be regarded as investigational or for research. This laboratory has been approved by CLIA 88, designated as a high-complexity laboratory and is qualified to perform these tests.  .Interpretation performed at [facility], [address] [id number]        Gross Description       A. The specimen is received in formalin, labeled with the patient's name and hospital number, and is designated \" duodenum biopsy rule out celiac\".  The specimen consists of multiple tan friable soft tissue fragments measuring 1.1 x 0.6 x 0.2 cm in aggregate.  Entirely submitted. Screened cassette.  B. The specimen is received in formalin, labeled with the patient's name and hospital number, and is designated \" gastric biopsy rule out H. pylori\".  The specimen consists of 2 tan friable soft tissue fragments, each measuring 0.4 cm in greatest dimension.  Entirely submitted. Screened cassette.  C. The specimen is received in formalin, labeled with the patient's name and hospital number, and is designated \" lower esophagus rule out EOE\".  The specimen consists of 2 colorless to tan friable soft tissue fragments ranging from 0.3 to 0.5 cm in greatest dimension.  Entirely submitted. Screened cassette.  D. The specimen is received in formalin, labeled with the patient's name and hospital number, and is designated \" mid esophagus rule out EOE\".  The specimen consists of 2 colorless to tan friable soft tissue fragments ranging from 0.4 to 0.5 cm in greatest dimension.  Entirely submitted. Screened cassette.  E. The specimen is received in formalin, labeled with the patient's name and hospital number, and is designated \" cecal biopsy\".  The specimen consists of 3 tan friable soft " "tissue fragments ranging from 0.3 to 0.4 cm in greatest dimension.  Entirely submitted. Screened cassette.  F. The specimen is received in formalin, labeled with the patient's name and hospital number, and is designated \" ascending colon biopsy\".  The specimen consists of multiple tan friable soft tissue fragments measuring 1.0 x 0.4 x 0.2 cm in aggregate.  Entirely submitted. Screened cassette.  G. The specimen is received in formalin, labeled with the patient's name and hospital number, and is designated \" transverse colon biopsy\".  The specimen consists of 3 tan friable soft tissue fragments ranging from 0.3 to 0.5 cm in greatest dimension.  Entirely submitted. Screened cassette.  H. The specimen is received in formalin, labeled with the patient's name and hospital number, and is designated \" descending colon biopsy\".  The specimen consists of 2 tan friable soft tissue fragments, each measuring 0.4 cm in greatest dimension.  Entirely submitted. Screened cassette.  I. The specimen is received in formalin, labeled with the patient's name and hospital number, and is designated \" sigmoid colon biopsy\".  The specimen consists of 2 tan friable soft tissue fragments, each measuring 0.4 cm in greatest dimension.  Entirely submitted. Screened cassette.  J. The specimen is received in formalin, labeled with the patient's name and hospital number, and is designated \" rectum biopsy rule out Crohn's\".  The specimen consists of 3 tan friable soft tissue fragments, each measuring 0.3 cm in greatest dimension.  Entirely submitted. Screened cassette.    Note: The estimated total formalin fixation time based upon information provided by the submitting clinician and the standard processing schedule is under 72 hours.    SSarginson      Clinical Information       History of loose stools, hematochezia, positive IBD panel and iron deficiency anemia-raising the possibility of undiagnosed inflammatory bowel disease given family history " of ulcerative colitis in mother.   POCT Rapid Covid Ag    Collection Time: 10/17/23  5:05 PM   Result Value Ref Range    POCT SARS-CoV-2 Ag Negative Negative    VALID CONTROL Valid    CBC and Platelet    Collection Time: 12/26/23  3:20 PM   Result Value Ref Range    WBC 12.34 (H) 4.31 - 10.16 Thousand/uL    RBC 5.07 3.81 - 5.12 Million/uL    Hemoglobin 13.4 11.5 - 15.4 g/dL    Hematocrit 41.5 34.8 - 46.1 %    MCV 82 82 - 98 fL    MCH 26.4 (L) 26.8 - 34.3 pg    MCHC 32.3 31.4 - 37.4 g/dL    RDW 14.2 11.6 - 15.1 %    Platelets 312 149 - 390 Thousands/uL    MPV 11.8 8.9 - 12.7 fL   Comprehensive metabolic panel    Collection Time: 12/26/23  3:20 PM   Result Value Ref Range    Sodium 138 135 - 147 mmol/L    Potassium 3.9 3.5 - 5.3 mmol/L    Chloride 104 96 - 108 mmol/L    CO2 26 21 - 32 mmol/L    ANION GAP 8 mmol/L    BUN 14 5 - 25 mg/dL    Creatinine 0.90 0.60 - 1.30 mg/dL    Glucose 87 65 - 140 mg/dL    Calcium 9.3 8.4 - 10.2 mg/dL    AST 13 13 - 39 U/L    ALT 19 7 - 52 U/L    Alkaline Phosphatase 117 (H) 34 - 104 U/L    Total Protein 7.5 6.4 - 8.4 g/dL    Albumin 4.4 3.5 - 5.0 g/dL    Total Bilirubin 0.20 0.20 - 1.00 mg/dL    eGFR 88 ml/min/1.73sq m   Hepatitis B surface antibody    Collection Time: 12/26/23  3:20 PM   Result Value Ref Range    Hep B S Ab 4.47 3-500 mIU/mL mIU/mL   Chronic Hepatitis Panel    Collection Time: 12/26/23  3:20 PM   Result Value Ref Range    Hepatitis B Surface Ag Non-reactive Non-Reactive    Hepatitis C Ab Non-reactive Non-Reactive    Hep B C IgM Non-reactive Non-Reactive    Hep B Core Total Ab Non-reactive Non-Reactive   Quantiferon TB Gold Plus Gray    Collection Time: 12/26/23  3:20 PM   Result Value Ref Range    QFT Nil 0.01 0 - 8.0 IU/ml   Quantiferon TB Gold Plus Green    Collection Time: 12/26/23  3:20 PM   Result Value Ref Range    QFT TB1-NIL 0.00 IU/ml   Quantiferon TB Gold Plus Yellow    Collection Time: 12/26/23  3:20 PM   Result Value Ref Range    QFT TB2-NIL 0.00 IU/ml    Quantiferon TB Gold Plus Purple    Collection Time: 12/26/23  3:20 PM   Result Value Ref Range    QFT Mitogen-NIL 7.27 IU/ml    QFT Final Interpretation Negative Negative   Comprehensive metabolic panel    Collection Time: 01/08/24  4:22 PM   Result Value Ref Range    Sodium 133 (L) 135 - 147 mmol/L    Potassium 4.2 3.5 - 5.3 mmol/L    Chloride 101 96 - 108 mmol/L    CO2 23 21 - 32 mmol/L    ANION GAP 9 mmol/L    BUN 19 5 - 25 mg/dL    Creatinine 0.81 0.60 - 1.30 mg/dL    Glucose 98 65 - 140 mg/dL    Calcium 9.1 8.4 - 10.2 mg/dL    AST 10 (L) 13 - 39 U/L    ALT 15 7 - 52 U/L    Alkaline Phosphatase 106 (H) 34 - 104 U/L    Total Protein 7.3 6.4 - 8.4 g/dL    Albumin 4.3 3.5 - 5.0 g/dL    Total Bilirubin 0.26 0.20 - 1.00 mg/dL    eGFR 100 ml/min/1.73sq m   CBC and Platelet    Collection Time: 01/08/24  4:22 PM   Result Value Ref Range    WBC 13.23 (H) 4.31 - 10.16 Thousand/uL    RBC 5.27 (H) 3.81 - 5.12 Million/uL    Hemoglobin 13.7 11.5 - 15.4 g/dL    Hematocrit 43.2 34.8 - 46.1 %    MCV 82 82 - 98 fL    MCH 26.0 (L) 26.8 - 34.3 pg    MCHC 31.7 31.4 - 37.4 g/dL    RDW 14.6 11.6 - 15.1 %    Platelets 292 149 - 390 Thousands/uL    MPV 11.6 8.9 - 12.7 fL   C-reactive protein    Collection Time: 01/08/24  4:22 PM   Result Value Ref Range    CRP 14.0 (H) <3.0 mg/L   Vitamin D 25 hydroxy    Collection Time: 01/08/24  4:22 PM   Result Value Ref Range    Vit D, 25-Hydroxy 12.4 (L) 30.0 - 100.0 ng/mL   POCT rapid PCR strepA    Collection Time: 04/10/24  5:46 PM   Result Value Ref Range    RAPID PCR STREP A Not Detected Not Detected       Laboratory Results: I have personally reviewed the pertinent laboratory results/reports       Assessment/Plan:  1. Severe episode of recurrent major depressive disorder, without psychotic features (HCC)  -     escitalopram (LEXAPRO) 10 mg tablet; Take 1 tablet (10 mg total) by mouth daily  2. Class 3 severe obesity due to excess calories with serious comorbidity and body mass index (BMI) of  "40.0 to 44.9 in adult (HCC)  -     phentermine 30 MG capsule; Take 1 capsule (30 mg total) by mouth every morning  -     topiramate (TOPAMAX) 25 mg tablet; Take 1 tablet (25 mg total) by mouth 2 (two) times a day  3. Anxiety and depression  -     escitalopram (LEXAPRO) 10 mg tablet; Take 1 tablet (10 mg total) by mouth daily      Continue phentermine daily  Recommend continuing topiramate  Start lexapro               Depression Screening Follow-up Plan: Patient's depression screening was positive with a PHQ-2 score of . Their PHQ-9 score was 21. Continue regular follow-up with their psychologist/therapist/psychiatrist who is managing their mental health condition(s).  Patient is acceptable.  She has reached out to psychiatry and is on a waiting list but does see the therapist.  Given she has not experienced 11 pounds weight loss on phentermine will continue this for now.    Read package inserts for all medications before starting a new medications, call me if you have any questions.    Patient was given opportunity to ask questions and all questions were answered.    Disclaimer: Portions of the record may have been created with voice recognition software. Occasional wrong word or \"sound a like\" substitutions may have occurred due to the inherent limitations of voice recognition software. Read the chart carefully and recognize, using context, where substitutions have occurred. I have used the Epic copy/forward function to compose this note. I have reviewed my current note to ensure it reflects the current patient status, exam, assessment and plan.  "

## 2024-08-22 PROBLEM — F33.2 SEVERE EPISODE OF RECURRENT MAJOR DEPRESSIVE DISORDER, WITHOUT PSYCHOTIC FEATURES (HCC): Status: ACTIVE | Noted: 2024-08-22

## 2024-10-02 ENCOUNTER — HOSPITAL ENCOUNTER (OUTPATIENT)
Dept: MRI IMAGING | Facility: HOSPITAL | Age: 29
Discharge: HOME | End: 2024-10-02
Payer: COMMERCIAL

## 2024-10-02 DIAGNOSIS — K50.019 CROHN'S DISEASE OF SMALL INTESTINE WITH COMPLICATION (HCC): ICD-10-CM

## 2024-10-02 PROCEDURE — A9585 GADOBUTROL INJECTION: HCPCS | Performed by: PHYSICIAN ASSISTANT

## 2024-10-02 PROCEDURE — 72197 MRI PELVIS W/O & W/DYE: CPT

## 2024-10-02 PROCEDURE — 74183 MRI ABD W/O CNTR FLWD CNTR: CPT

## 2024-10-02 RX ORDER — GADOBUTROL 604.72 MG/ML
10 INJECTION INTRAVENOUS
Status: COMPLETED | OUTPATIENT
Start: 2024-10-02 | End: 2024-10-02

## 2024-10-02 RX ADMIN — GADOBUTROL 10 ML: 604.72 INJECTION INTRAVENOUS at 09:53

## 2024-10-02 RX ADMIN — GLUCAGON 1 MG: KIT at 09:49

## 2024-10-14 ENCOUNTER — TELEPHONE (OUTPATIENT)
Age: 29
End: 2024-10-14

## 2024-10-14 NOTE — TELEPHONE ENCOUNTER
She is high risk to get it , would recommend avoiding exposure. Everyone can mask to prevent transmission if isolation is not possible. Prophylactic treatment is not recommended, she can call if she develops symptoms and do a home test if she is exposed.

## 2024-10-14 NOTE — TELEPHONE ENCOUNTER
Patient called in to report her daughter is 3 yo and has exposure to Covid and is appearing to have symptoms. RN advised to try to test child to confirm status. Patient reports she is immunocompromised; taking Remicade for Crohn's disease. Is it alright for her to  daughter from father; both grandparents at home have Covid to bring her home? Please follow up with patient for provider response.

## 2024-10-14 NOTE — TELEPHONE ENCOUNTER
SPOKE WITH PT, CALLING TO F/U PREVIOUS MESSAGE. PT STATES SHE TESTED NEGATIVE FOR COVID, HAS TEMP 100, CONGESTION, SORE THROAT, SOB, DIARRHEA, STOMACH UPSET. DENIES N/V, BLACK OR BLOODY STOOL. PT HAS HX CROHN'S DISEASE, LAST REMICADE DOSE WAS 9/16/24, NEXT DOSE DUE 11/11/24. PT ADVISED TO F/U WITH PCP OR URGENT CARE. PT REQUESTING  PROVIDERS ADDITIONAL RECOMMENDATIONS?

## 2024-10-14 NOTE — TELEPHONE ENCOUNTER
I agree with recommendations to follow-up with PCP or urgent care in the setting of fever.  This could be other viral infection such as the flu which she should be tested for.  From a Crohn's standpoint, her last Remicade infusion was last month and her next one is not for another month.  No adjustments need to be made with this at this point.  She also has a follow-up with us in the office early next month to check in prior to her next infusion.

## 2024-10-14 NOTE — TELEPHONE ENCOUNTER
Patient calling with concerns regarding possible exposure to Covid.  Patient on remicade and is concerned that her daughter (who has been with her father and grandparents) was exposed.  Both grandmother and grandfather positive and she has been staying with them.  Wants to know risks of her getting covid or being around daughter with exposure.  Please advise.

## 2024-11-06 ENCOUNTER — OFFICE VISIT (OUTPATIENT)
Dept: GASTROENTEROLOGY | Facility: AMBULARY SURGERY CENTER | Age: 29
End: 2024-11-06
Payer: COMMERCIAL

## 2024-11-06 VITALS
HEART RATE: 89 BPM | DIASTOLIC BLOOD PRESSURE: 94 MMHG | BODY MASS INDEX: 33.52 KG/M2 | HEIGHT: 66 IN | OXYGEN SATURATION: 98 % | WEIGHT: 208.6 LBS | SYSTOLIC BLOOD PRESSURE: 138 MMHG

## 2024-11-06 DIAGNOSIS — K50.019 CROHN'S DISEASE OF SMALL INTESTINE WITH COMPLICATION (HCC): Primary | ICD-10-CM

## 2024-11-06 DIAGNOSIS — K29.00 ACUTE GASTRITIS, PRESENCE OF BLEEDING UNSPECIFIED, UNSPECIFIED GASTRITIS TYPE: ICD-10-CM

## 2024-11-06 DIAGNOSIS — K27.9 PUD (PEPTIC ULCER DISEASE): ICD-10-CM

## 2024-11-06 PROCEDURE — 99214 OFFICE O/P EST MOD 30 MIN: CPT | Performed by: INTERNAL MEDICINE

## 2024-11-06 RX ORDER — INFLIXIMAB 100 MG/10ML
INJECTION, POWDER, LYOPHILIZED, FOR SOLUTION INTRAVENOUS
COMMUNITY

## 2024-11-06 NOTE — ASSESSMENT & PLAN NOTE
-Currently on infliximab every 8 weeks.  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  Would recommend checking infliximab drug levels and antibodies.  Would also recommend checking fecal calprotectin.  Check CBC and CMP.  Will check gold QuantiFERON again.  -Will need to either change or adjust dosing of infliximab depending on drug levels and antibodies.  -Will also plan for colonoscopy in the next several months to assess the stricture.  -Recommend dermatology referral for annual skin exam.  -Recommend following up with GYN annually.  -Recommend influenza vaccine and booster COVID-vaccine.  -Avoid live vaccines.  -Recommend abstinence from smoking and nicotine.

## 2024-11-06 NOTE — PROGRESS NOTES
Ambulatory Visit  Name: Vickie Stock      : 1995      MRN: [id number]  Encounter Provider: Rajeev Lechuga MD  Encounter Date: 2024   Encounter department: [facility] [place]    Assessment & Plan  Crohn's disease of small intestine with complication (HCC)  -Currently on infliximab every 8 weeks.  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  Would recommend checking infliximab drug levels and antibodies.  Would also recommend checking fecal calprotectin.  Check CBC and CMP.  Will check gold QuantiFERON again.  -Will need to either change or adjust dosing of infliximab depending on drug levels and antibodies.  -Will also plan for colonoscopy in the next several months to assess the stricture.  -Recommend dermatology referral for annual skin exam.  -Recommend following up with GYN annually.  -Recommend influenza vaccine and booster COVID-vaccine.  -Avoid live vaccines.  -Recommend abstinence from smoking and nicotine.           PUD (peptic ulcer disease)  -Continue Protonix 40 mg daily.         Acute gastritis, presence of bleeding unspecified, unspecified gastritis type  -See above.     Follow-up in 2 to 3 months.    History of Present Illness     Vikcie Stock is a 29 y.o. female with history of Crohn's ileitis with stricturing disease notable on MRI involving the distal TI, on infliximab presents for follow-up.  She is currently on infliximab every 8 weeks.  Patient reports having episodes of abdominal pain once or twice a month and loose stools once or twice a week.  Patient reports that she can has anywhere to 8-20 bowel movements on the days that she is having loose stools which could vary once or twice a week.  Denies any blood in the stool.  No weight loss.  No melena or  "hematochezia.  Denies any upper GI symptoms including acid reflux, dysphagia or odynophagia.  No nausea or vomiting.  She underwent MRI last month notable for mild active inflammatory Crohn's disease with luminal narrowing ( stricture) in the terminal ileum, it appears to be mildly improved from 1 year ago.  It appears that to measure 13 cm from 20 cm previously.  Colonoscopy last year was notable for stricture in the ileocecal valve and mild erythema in the cecum, ascending colon and proximal transverse colon.  Denies any joint pains, rashes.  Denies any vision problems.  Review of Systems   Constitutional: Negative.    HENT: Negative.     Eyes: Negative.    Respiratory: Negative.     Cardiovascular: Negative.    Gastrointestinal:         See HPI.   Endocrine: Negative.    Genitourinary: Negative.    Musculoskeletal: Negative.    Skin: Negative.    Allergic/Immunologic: Negative.    Neurological: Negative.    Hematological: Negative.    Psychiatric/Behavioral: Negative.     All other systems reviewed and are negative.          Objective     /94 (BP Location: Left arm, Patient Position: Sitting, Cuff Size: Standard)   Pulse 89   Ht 5' 6\" (1.676 m)   Wt 94.6 kg (208 lb 9.6 oz)   SpO2 98%   BMI 33.67 kg/m²     Physical Exam  Vitals and nursing note reviewed.   Constitutional:       General: She is not in acute distress.     Appearance: She is well-developed.   HENT:      Head: Normocephalic and atraumatic.   Eyes:      General: No scleral icterus.     Conjunctiva/sclera: Conjunctivae normal.   Cardiovascular:      Rate and Rhythm: Normal rate and regular rhythm.      Heart sounds: No murmur heard.  Pulmonary:      Effort: Pulmonary effort is normal. No respiratory distress.      Breath sounds: Normal breath sounds.   Abdominal:      Palpations: Abdomen is soft.      Tenderness: There is no abdominal tenderness.   Musculoskeletal:         General: No swelling.      Cervical back: Neck supple.   Skin:     " General: Skin is warm and dry.   Neurological:      Mental Status: She is alert.   Psychiatric:         Mood and Affect: Mood normal.

## 2024-11-06 NOTE — PATIENT INSTRUCTIONS
Scheduled date of colonoscopy (as of today): 1/20/25  Physician performing colonoscopy: Dr. Lechuga  Location of colonoscopy: [facility]  Bowel prep reviewed with patient: miralax  Instructions reviewed with patient by: russell  Clearances: na

## 2024-11-20 ENCOUNTER — OFFICE VISIT (OUTPATIENT)
Dept: FAMILY MEDICINE CLINIC | Facility: CLINIC | Age: 29
End: 2024-11-20
Payer: COMMERCIAL

## 2024-11-20 VITALS
SYSTOLIC BLOOD PRESSURE: 124 MMHG | WEIGHT: 213 LBS | HEIGHT: 66 IN | BODY MASS INDEX: 34.23 KG/M2 | OXYGEN SATURATION: 98 % | TEMPERATURE: 97.7 F | HEART RATE: 85 BPM | DIASTOLIC BLOOD PRESSURE: 82 MMHG | RESPIRATION RATE: 16 BRPM

## 2024-11-20 DIAGNOSIS — F33.2 SEVERE EPISODE OF RECURRENT MAJOR DEPRESSIVE DISORDER, WITHOUT PSYCHOTIC FEATURES (HCC): ICD-10-CM

## 2024-11-20 DIAGNOSIS — E66.01 CLASS 3 SEVERE OBESITY DUE TO EXCESS CALORIES WITH SERIOUS COMORBIDITY AND BODY MASS INDEX (BMI) OF 40.0 TO 44.9 IN ADULT (HCC): Primary | ICD-10-CM

## 2024-11-20 DIAGNOSIS — K50.019 CROHN'S DISEASE OF SMALL INTESTINE WITH COMPLICATION (HCC): ICD-10-CM

## 2024-11-20 DIAGNOSIS — E66.813 CLASS 3 SEVERE OBESITY DUE TO EXCESS CALORIES WITH SERIOUS COMORBIDITY AND BODY MASS INDEX (BMI) OF 40.0 TO 44.9 IN ADULT (HCC): Primary | ICD-10-CM

## 2024-11-20 PROBLEM — M32.9 SYSTEMIC LUPUS ERYTHEMATOSUS, UNSPECIFIED SLE TYPE, UNSPECIFIED ORGAN INVOLVEMENT STATUS (HCC): Status: ACTIVE | Noted: 2024-11-20

## 2024-11-20 PROCEDURE — 99214 OFFICE O/P EST MOD 30 MIN: CPT | Performed by: FAMILY MEDICINE

## 2024-11-20 RX ORDER — TOPIRAMATE 25 MG/1
25 TABLET, FILM COATED ORAL 2 TIMES DAILY
Qty: 60 TABLET | Refills: 2 | Status: SHIPPED | OUTPATIENT
Start: 2024-11-20

## 2024-11-20 RX ORDER — PHENTERMINE HYDROCHLORIDE 30 MG/1
30 CAPSULE ORAL EVERY MORNING
Qty: 90 CAPSULE | Refills: 0 | Status: SHIPPED | OUTPATIENT
Start: 2024-11-20

## 2024-11-20 NOTE — PROGRESS NOTES
Subjective:      Patient ID: Vickie Stock is a 29 y.o. female.    Obesity- 224 lbs->213 lbs in 9/2024->213 lbs, states she was down to 200 lbs however due to social situation and breakup she has not been taking medications regularly and daily     Also has been seeing therapist, still unable to get psych appointment,did not start lexapro    Crohns- sees GI  Malar rash- had elevated autoimmune markers, never diagnosed with lupus          Past Medical History:   Diagnosis Date    Chronic back pain     Colitis     History of transfusion     Multiple abrasions 2013    Proteinuria     Shoulder disorder 11/15/2018    Urinary tract infection     pylonephritis       Family History   Problem Relation Age of Onset    Hypertension Mother     Arthritis Mother     Depression Mother     COPD Mother     Gestational diabetes Mother     COPD Father     Depression Sister     Anxiety disorder Sister     Cholelithiasis Sister     Depression Brother     Bipolar disorder Brother     Depression Brother     Depression Brother     No Known Problems Maternal Grandmother     COPD Maternal Grandfather     Cancer Maternal Grandfather     Lung cancer Paternal Grandmother     Cancer Paternal Grandmother     Kidney cancer Paternal Grandmother     Heart disease Paternal Grandfather     Skin cancer Maternal Uncle        Past Surgical History:   Procedure Laterality Date    COLONOSCOPY      DILATION AND CURETTAGE OF UTERUS N/A 08/23/2021    Procedure: DILATATION AND CURETTAGE (D&C);  Surgeon: Rona Puga MD;  [facility];  Service: Obstetrics    EXAMINATION UNDER ANESTHESIA N/A 08/23/2021    Procedure: EXAM UNDER ANESTHESIA (EUA) WITH INSERTION OF BAKRI BALLOON;  Surgeon: Rona Puga MD;  [facility];  Service: Obstetrics    TONSILLECTOMY  2009    UPPER GASTROINTESTINAL ENDOSCOPY      WISDOM TOOTH EXTRACTION          reports that she quit smoking about 3 years ago. Her smoking use included cigarettes. She has never  used smokeless tobacco. She reports that she does not currently use alcohol. She reports that she does not currently use drugs after having used the following drugs: Marijuana.      Current Outpatient Medications:     cetirizine (ZyrTEC) 10 mg tablet, Take 1 tablet (10 mg total) by mouth daily, Disp: 90 tablet, Rfl: 1    inFLIXimab (REMICADE) 100 mg, Inject into a catheter in a vein, Disp: , Rfl:     ketoconazole (NIZORAL) 2 % shampoo, as needed, Disp: , Rfl:     pantoprazole (PROTONIX) 40 mg tablet, Take 1 tablet (40 mg total) by mouth daily, Disp: 90 tablet, Rfl: 1    phentermine 30 MG capsule, Take 1 capsule (30 mg total) by mouth every morning, Disp: 90 capsule, Rfl: 0    topiramate (TOPAMAX) 25 mg tablet, Take 1 tablet (25 mg total) by mouth 2 (two) times a day, Disp: 60 tablet, Rfl: 2    ergocalciferol (ERGOCALCIFEROL) 1.25 MG (19904 UT) capsule, Take 1 capsule (50,000 Units total) by mouth once a week (Patient not taking: Reported on 11/20/2024), Disp: 12 capsule, Rfl: 0    Ferrous Sulfate (IRON PO), Take 1 tablet every day by oral route. (Patient not taking: Reported on 11/6/2024), Disp: , Rfl:     fluticasone (FLONASE) 50 mcg/act nasal spray, 1 spray into each nostril daily (Patient not taking: Reported on 11/20/2024), Disp: 48 mL, Rfl: 0    The following portions of the patient's history were reviewed and updated as appropriate: allergies, current medications, past family history, past medical history, past social history, past surgical history and problem list.    Review of Systems   Constitutional:  Negative for fatigue and fever.   HENT:  Negative for congestion, facial swelling, mouth sores, rhinorrhea, sore throat and trouble swallowing.    Eyes:  Negative for pain and redness.   Respiratory:  Negative for cough, shortness of breath and wheezing.    Cardiovascular:  Negative for chest pain, palpitations and leg swelling.   Gastrointestinal:  Negative for abdominal pain, blood in stool, constipation,  "diarrhea and nausea.   Genitourinary:  Negative for dysuria, hematuria and urgency.   Musculoskeletal:  Negative for arthralgias, back pain and myalgias.   Skin:  Negative for rash and wound.   Neurological:  Negative for seizures, syncope and headaches.   Hematological:  Negative for adenopathy.   Psychiatric/Behavioral:  Negative for agitation, behavioral problems and sleep disturbance.          PHQ-2/9 Depression Screening    Little interest or pleasure in doing things: 0 - not at all  Feeling down, depressed, or hopeless: 0 - not at all  Trouble falling or staying asleep, or sleeping too much: 0 - not at all  Feeling tired or having little energy: 0 - not at all  Poor appetite or overeatin - not at all  Feeling bad about yourself - or that you are a failure or have let yourself or your family down: 0 - not at all  Trouble concentrating on things, such as reading the newspaper or watching television: 0 - not at all  Moving or speaking so slowly that other people could have noticed. Or the opposite - being so fidgety or restless that you have been moving around a lot more than usual: 0 - not at all  Thoughts that you would be better off dead, or of hurting yourself in some way: 0 - not at all  PHQ-9 Score: 0  PHQ-9 Interpretation: No or Minimal depression             Objective:    /82 (BP Location: Right arm, Patient Position: Sitting, Cuff Size: Adult)   Pulse 85   Temp 97.7 °F (36.5 °C) (Tympanic)   Resp 16   Ht 5' 6\" (1.676 m)   Wt 96.6 kg (213 lb)   SpO2 98%   BMI 34.38 kg/m²      Physical Exam  Vitals and nursing note reviewed.   Constitutional:       Appearance: Normal appearance. She is well-developed. She is obese. She is not ill-appearing.   HENT:      Head: Normocephalic and atraumatic.      Right Ear: External ear normal.      Left Ear: External ear normal.      Nose: Nose normal.      Mouth/Throat:      Mouth: Mucous membranes are moist.      Pharynx: No oropharyngeal exudate or " posterior oropharyngeal erythema.   Eyes:      General: No scleral icterus.        Right eye: No discharge.         Left eye: No discharge.      Conjunctiva/sclera: Conjunctivae normal.   Cardiovascular:      Rate and Rhythm: Normal rate.      Heart sounds: No murmur heard.     No gallop.   Pulmonary:      Effort: Pulmonary effort is normal. No respiratory distress.      Breath sounds: Normal breath sounds. No stridor. No wheezing, rhonchi or rales.   Abdominal:      Palpations: Abdomen is soft.      Tenderness: There is no abdominal tenderness.   Musculoskeletal:         General: No tenderness or deformity.   Skin:     Findings: No erythema or rash.   Neurological:      Mental Status: She is alert. Mental status is at baseline.   Psychiatric:         Behavior: Behavior normal.         Judgment: Judgment normal.           Recent Results (from the past 52 weeks)   CBC and Platelet    Collection Time: 12/26/23  3:20 PM   Result Value Ref Range    WBC 12.34 (H) 4.31 - 10.16 Thousand/uL    RBC 5.07 3.81 - 5.12 Million/uL    Hemoglobin 13.4 11.5 - 15.4 g/dL    Hematocrit 41.5 34.8 - 46.1 %    MCV 82 82 - 98 fL    MCH 26.4 (L) 26.8 - 34.3 pg    MCHC 32.3 31.4 - 37.4 g/dL    RDW 14.2 11.6 - 15.1 %    Platelets 312 149 - 390 Thousands/uL    MPV 11.8 8.9 - 12.7 fL   Comprehensive metabolic panel    Collection Time: 12/26/23  3:20 PM   Result Value Ref Range    Sodium 138 135 - 147 mmol/L    Potassium 3.9 3.5 - 5.3 mmol/L    Chloride 104 96 - 108 mmol/L    CO2 26 21 - 32 mmol/L    ANION GAP 8 mmol/L    BUN 14 5 - 25 mg/dL    Creatinine 0.90 0.60 - 1.30 mg/dL    Glucose 87 65 - 140 mg/dL    Calcium 9.3 8.4 - 10.2 mg/dL    AST 13 13 - 39 U/L    ALT 19 7 - 52 U/L    Alkaline Phosphatase 117 (H) 34 - 104 U/L    Total Protein 7.5 6.4 - 8.4 g/dL    Albumin 4.4 3.5 - 5.0 g/dL    Total Bilirubin 0.20 0.20 - 1.00 mg/dL    eGFR 88 ml/min/1.73sq m   Hepatitis B surface antibody    Collection Time: 12/26/23  3:20 PM   Result Value Ref  Range    Hep B S Ab 4.47 3-500 mIU/mL mIU/mL   Chronic Hepatitis Panel    Collection Time: 12/26/23  3:20 PM   Result Value Ref Range    Hepatitis B Surface Ag Non-reactive Non-Reactive    Hepatitis C Ab Non-reactive Non-Reactive    Hep B C IgM Non-reactive Non-Reactive    Hep B Core Total Ab Non-reactive Non-Reactive   Quantiferon TB Gold Plus Gray    Collection Time: 12/26/23  3:20 PM   Result Value Ref Range    QFT Nil 0.01 0 - 8.0 IU/ml   Quantiferon TB Gold Plus Green    Collection Time: 12/26/23  3:20 PM   Result Value Ref Range    QFT TB1-NIL 0.00 IU/ml   Quantiferon TB Gold Plus Yellow    Collection Time: 12/26/23  3:20 PM   Result Value Ref Range    QFT TB2-NIL 0.00 IU/ml   Quantiferon TB Gold Plus Purple    Collection Time: 12/26/23  3:20 PM   Result Value Ref Range    QFT Mitogen-NIL 7.27 IU/ml    QFT Final Interpretation Negative Negative   Comprehensive metabolic panel    Collection Time: 01/08/24  4:22 PM   Result Value Ref Range    Sodium 133 (L) 135 - 147 mmol/L    Potassium 4.2 3.5 - 5.3 mmol/L    Chloride 101 96 - 108 mmol/L    CO2 23 21 - 32 mmol/L    ANION GAP 9 mmol/L    BUN 19 5 - 25 mg/dL    Creatinine 0.81 0.60 - 1.30 mg/dL    Glucose 98 65 - 140 mg/dL    Calcium 9.1 8.4 - 10.2 mg/dL    AST 10 (L) 13 - 39 U/L    ALT 15 7 - 52 U/L    Alkaline Phosphatase 106 (H) 34 - 104 U/L    Total Protein 7.3 6.4 - 8.4 g/dL    Albumin 4.3 3.5 - 5.0 g/dL    Total Bilirubin 0.26 0.20 - 1.00 mg/dL    eGFR 100 ml/min/1.73sq m   CBC and Platelet    Collection Time: 01/08/24  4:22 PM   Result Value Ref Range    WBC 13.23 (H) 4.31 - 10.16 Thousand/uL    RBC 5.27 (H) 3.81 - 5.12 Million/uL    Hemoglobin 13.7 11.5 - 15.4 g/dL    Hematocrit 43.2 34.8 - 46.1 %    MCV 82 82 - 98 fL    MCH 26.0 (L) 26.8 - 34.3 pg    MCHC 31.7 31.4 - 37.4 g/dL    RDW 14.6 11.6 - 15.1 %    Platelets 292 149 - 390 Thousands/uL    MPV 11.6 8.9 - 12.7 fL   C-reactive protein    Collection Time: 01/08/24  4:22 PM   Result Value Ref Range     CRP 14.0 (H) <3.0 mg/L   Vitamin D 25 hydroxy    Collection Time: 01/08/24  4:22 PM   Result Value Ref Range    Vit D, 25-Hydroxy 12.4 (L) 30.0 - 100.0 ng/mL   POCT rapid PCR strepA    Collection Time: 04/10/24  5:46 PM   Result Value Ref Range    RAPID PCR STREP A Not Detected Not Detected       Laboratory Results: I have personally reviewed the pertinent laboratory results/reports     Radiology/Other Diagnostic Testing Results: I have reviewed the following imaging and agree with the interpretation below.    MRI enterography w wo  Result Date: 10/3/2024  MRI ABDOMEN AND PELVIS WITH AND WITHOUT CONTRAST (MR ENTEROGRAPHY) INDICATION: 29 years / Female. K50.019: Crohn's disease of small intestine with unspecified complications. History of stricturing Crohn's disease involving distal TI noted on MRE and colonoscopy in 2023. Has completed induction dosing of infliximab. COMPARISON: MR enterography dated December 6, 2023 TECHNIQUE: The following pulse sequences of the abdomen and pelvis were obtained: Axial 2D FIESTA with fat saturation, axial and coronal T2, DWI/ADC, pre-contrast coronal T1 with fat saturation, post-contrast dynamic coronal T1 at 20, 70, and 180 seconds  with fat saturation, and axial T1 post-contrast with fat saturation through the abdomen and pelvis. Enteric Contrast: 1500 cc of enteric contrast (Breeza) was administered beginning 45 minute prior to scanning. In addition, 1 mg of glucagon was administered IM prior to contrast administration by the radiology nurse. IV Contrast: 10 mL of Gadobutrol injection (SINGLE-DOSE) Note that dynamic imaging was tailored for evaluation of the bowel with limited evaluation of the remainder of the abdominal and pelvic viscera. ENTEROGRAPHY: - Small bowel distension: Adequate. - Bowel: Diseased bowel segment(s) as follows: - Number of diseased segments: PICKLIST-Select number. - Segment: 1 *  Length: Approximately 13  cm, (series 14 images 63-66), previously  approximately 20 cm *  Location: Terminal ileum to the level of the ileocecal valve. *  Segmental mural hyperenhancement: Asymmetric, along the mesenteric border. There is significantly improved from prior MRI at which time marked stratified mural enhancement was seen. *  Wall thickening: Mild (3-5 mm), improved from prior MRE. *  Intramural edema: Absent, previously present. *  Stricture:Probable stricture without upstream dilation (<3 cm). *  Restricted diffusion: Mild (series 10 image 67), improved from prior exam. *  Ulcerations: Probable. *  Sacculations: Present (series 14 image 71). *  Diminished motility: Mildly diminished motility of the diseased segment. *  Penetrating complication: None. - Mesenteric findings: Engorged vasa recta and fibrofatty infiltration. Mildly enlarged mesenteric lymph nodes in the right lower quadrant. - Extraintestinal findings: None. REMAINDER OF THE ABDOMEN AND PELVIS: LOWER THORAX: Unremarkable. LIVER: Normal in size and configuration. No suspicious mass. BILE DUCTS: No intrahepatic or extrahepatic bile ductal dilation. GALLBLADDER: Normal SPLEEN: Normal. PANCREAS: Unremarkable. ADRENAL GLANDS: Normal. KIDNEYS/URETERS: No hydroureteronephrosis. No suspicious renal mass. PERITONEUM/RETROPERITONEUM: No ascites. LYMPH NODES: No lymphadenopathy. REPRODUCTIVE STRUCTURES: Age-appropriate. BLADDER: Normal. VESSELS: No aneurysm. ABDOMINOPELVIC WALL: Unremarkable BONES: No suspicious osseous lesion.     1.  Mildly active inflammatory Crohn disease with luminal narrowing and probable stricture involving the terminal ileum, significantly improved from 12/6/2023 evidenced by decreased extent of inflammatory involvement with decreased wall thickening, enhancement, and restricted diffusion. No penetrating complications. Workstation performed: [id number]        Assessment/Plan:  1. Class 3 severe obesity due to excess calories with serious comorbidity and body mass index (BMI) of 40.0 to 44.9  "in adult (HCC)  -     phentermine 30 MG capsule; Take 1 capsule (30 mg total) by mouth every morning  -     topiramate (TOPAMAX) 25 mg tablet; Take 1 tablet (25 mg total) by mouth 2 (two) times a day  2. Crohn's disease of small intestine with complication (HCC)  3. Severe episode of recurrent major depressive disorder, without psychotic features (HCC)        I discussed that she should not take more than 12 months of phentermine- topiramate due to increased risk of pulmonary fibrosis and cardiovascular events and she is agreeable to the plan.  She however wants to continue it for another 3 months which will be total of 12 months.  After that she is willing to try Zepbound or Wegovy if needed.  She is following 1500 to 1800-calorie diet and 64 ounces of water, 15 minutes of exercise per week.  F/u in 3mnths           Read package inserts for all medications before starting a new medications, call me if you have any questions.    Patient was given opportunity to ask questions and all questions were answered.    Disclaimer: Portions of the record may have been created with voice recognition software. Occasional wrong word or \"sound a like\" substitutions may have occurred due to the inherent limitations of voice recognition software. Read the chart carefully and recognize, using context, where substitutions have occurred. I have used the Epic copy/forward function to compose this note. I have reviewed my current note to ensure it reflects the current patient status, exam, assessment and plan.    "

## 2024-12-16 DIAGNOSIS — K27.9 PUD (PEPTIC ULCER DISEASE): ICD-10-CM

## 2024-12-17 RX ORDER — PANTOPRAZOLE SODIUM 40 MG/1
40 TABLET, DELAYED RELEASE ORAL DAILY
Qty: 90 TABLET | Refills: 1 | Status: SHIPPED | OUTPATIENT
Start: 2024-12-17

## 2024-12-27 ENCOUNTER — APPOINTMENT (EMERGENCY)
Dept: CT IMAGING | Facility: HOSPITAL | Age: 29
End: 2024-12-27
Payer: COMMERCIAL

## 2024-12-27 ENCOUNTER — HOSPITAL ENCOUNTER (EMERGENCY)
Facility: HOSPITAL | Age: 29
Discharge: HOME | End: 2024-12-27
Attending: EMERGENCY MEDICINE
Payer: COMMERCIAL

## 2024-12-27 ENCOUNTER — HOSPITAL ENCOUNTER (OUTPATIENT)
Facility: HOSPITAL | Age: 29
Setting detail: OBSERVATION
Discharge: HOME | End: 2024-12-29
Attending: STUDENT IN AN ORGANIZED HEALTH CARE EDUCATION/TRAINING PROGRAM | Admitting: STUDENT IN AN ORGANIZED HEALTH CARE EDUCATION/TRAINING PROGRAM
Payer: COMMERCIAL

## 2024-12-27 VITALS
SYSTOLIC BLOOD PRESSURE: 134 MMHG | DIASTOLIC BLOOD PRESSURE: 81 MMHG | HEART RATE: 94 BPM | TEMPERATURE: 98.1 F | OXYGEN SATURATION: 98 % | RESPIRATION RATE: 18 BRPM

## 2024-12-27 DIAGNOSIS — K50.019 CROHN'S DISEASE OF SMALL INTESTINE WITH COMPLICATION (HCC): ICD-10-CM

## 2024-12-27 DIAGNOSIS — L03.213 PRESEPTAL CELLULITIS: Primary | ICD-10-CM

## 2024-12-27 DIAGNOSIS — H05.011 ORBITAL CELLULITIS ON RIGHT: Primary | ICD-10-CM

## 2024-12-27 DIAGNOSIS — L03.213 PRESEPTAL CELLULITIS OF RIGHT EYE: ICD-10-CM

## 2024-12-27 LAB
ANION GAP SERPL CALCULATED.3IONS-SCNC: 5 MMOL/L (ref 4–13)
BASOPHILS # BLD AUTO: 0.09 THOUSANDS/ÂΜL (ref 0–0.1)
BASOPHILS NFR BLD AUTO: 1 % (ref 0–1)
BUN SERPL-MCNC: 11 MG/DL (ref 5–25)
CALCIUM SERPL-MCNC: 9.1 MG/DL (ref 8.4–10.2)
CHLORIDE SERPL-SCNC: 105 MMOL/L (ref 96–108)
CO2 SERPL-SCNC: 28 MMOL/L (ref 21–32)
CREAT SERPL-MCNC: 0.82 MG/DL (ref 0.6–1.3)
EOSINOPHIL # BLD AUTO: 0.07 THOUSAND/ÂΜL (ref 0–0.61)
EOSINOPHIL NFR BLD AUTO: 1 % (ref 0–6)
ERYTHROCYTE [DISTWIDTH] IN BLOOD BY AUTOMATED COUNT: 13.8 % (ref 11.6–15.1)
GFR SERPL CREATININE-BSD FRML MDRD: 98 ML/MIN/1.73SQ M
GLUCOSE SERPL-MCNC: 89 MG/DL (ref 65–140)
HCT VFR BLD AUTO: 42 % (ref 34.8–46.1)
HGB BLD-MCNC: 13.5 G/DL (ref 11.5–15.4)
IMM GRANULOCYTES # BLD AUTO: 0.02 THOUSAND/UL (ref 0–0.2)
IMM GRANULOCYTES NFR BLD AUTO: 0 % (ref 0–2)
LYMPHOCYTES # BLD AUTO: 3.11 THOUSANDS/ÂΜL (ref 0.6–4.47)
LYMPHOCYTES NFR BLD AUTO: 32 % (ref 14–44)
MCH RBC QN AUTO: 28.7 PG (ref 26.8–34.3)
MCHC RBC AUTO-ENTMCNC: 32.1 G/DL (ref 31.4–37.4)
MCV RBC AUTO: 89 FL (ref 82–98)
MONOCYTES # BLD AUTO: 0.76 THOUSAND/ÂΜL (ref 0.17–1.22)
MONOCYTES NFR BLD AUTO: 8 % (ref 4–12)
NEUTROPHILS # BLD AUTO: 5.63 THOUSANDS/ÂΜL (ref 1.85–7.62)
NEUTS SEG NFR BLD AUTO: 58 % (ref 43–75)
NRBC BLD AUTO-RTO: 0 /100 WBCS
PLATELET # BLD AUTO: 209 THOUSANDS/UL (ref 149–390)
PMV BLD AUTO: 11.4 FL (ref 8.9–12.7)
POTASSIUM SERPL-SCNC: 3.8 MMOL/L (ref 3.5–5.3)
RBC # BLD AUTO: 4.7 MILLION/UL (ref 3.81–5.12)
SODIUM SERPL-SCNC: 138 MMOL/L (ref 135–147)
WBC # BLD AUTO: 9.68 THOUSAND/UL (ref 4.31–10.16)

## 2024-12-27 PROCEDURE — 70481 CT ORBIT/EAR/FOSSA W/DYE: CPT

## 2024-12-27 PROCEDURE — 96375 TX/PRO/DX INJ NEW DRUG ADDON: CPT

## 2024-12-27 PROCEDURE — 99285 EMERGENCY DEPT VISIT HI MDM: CPT | Performed by: STUDENT IN AN ORGANIZED HEALTH CARE EDUCATION/TRAINING PROGRAM

## 2024-12-27 PROCEDURE — 99283 EMERGENCY DEPT VISIT LOW MDM: CPT

## 2024-12-27 PROCEDURE — 96374 THER/PROPH/DIAG INJ IV PUSH: CPT

## 2024-12-27 PROCEDURE — 99284 EMERGENCY DEPT VISIT MOD MDM: CPT

## 2024-12-27 PROCEDURE — 96365 THER/PROPH/DIAG IV INF INIT: CPT

## 2024-12-27 PROCEDURE — 36415 COLL VENOUS BLD VENIPUNCTURE: CPT

## 2024-12-27 PROCEDURE — 99284 EMERGENCY DEPT VISIT MOD MDM: CPT | Performed by: EMERGENCY MEDICINE

## 2024-12-27 PROCEDURE — 80048 BASIC METABOLIC PNL TOTAL CA: CPT

## 2024-12-27 PROCEDURE — 85025 COMPLETE CBC W/AUTO DIFF WBC: CPT

## 2024-12-27 RX ORDER — SULFAMETHOXAZOLE AND TRIMETHOPRIM 800; 160 MG/1; MG/1
1 TABLET ORAL 2 TIMES DAILY
Qty: 14 TABLET | Refills: 0 | Status: SHIPPED | OUTPATIENT
Start: 2024-12-27 | End: 2025-01-03

## 2024-12-27 RX ORDER — TETRACAINE HYDROCHLORIDE 5 MG/ML
2 SOLUTION OPHTHALMIC ONCE
Status: COMPLETED | OUTPATIENT
Start: 2024-12-27 | End: 2024-12-27

## 2024-12-27 RX ORDER — SULFAMETHOXAZOLE AND TRIMETHOPRIM 800; 160 MG/1; MG/1
1 TABLET ORAL 2 TIMES DAILY
Qty: 14 TABLET | Refills: 0 | Status: SHIPPED | OUTPATIENT
Start: 2024-12-27 | End: 2024-12-27

## 2024-12-27 RX ORDER — CEFDINIR 300 MG/1
300 CAPSULE ORAL EVERY 12 HOURS SCHEDULED
Qty: 14 CAPSULE | Refills: 0 | Status: SHIPPED | OUTPATIENT
Start: 2024-12-27 | End: 2024-12-29

## 2024-12-27 RX ORDER — ACETAMINOPHEN 10 MG/ML
1000 INJECTION, SOLUTION INTRAVENOUS ONCE
Status: COMPLETED | OUTPATIENT
Start: 2024-12-27 | End: 2024-12-27

## 2024-12-27 RX ORDER — KETOROLAC TROMETHAMINE 30 MG/ML
15 INJECTION, SOLUTION INTRAMUSCULAR; INTRAVENOUS ONCE
Status: COMPLETED | OUTPATIENT
Start: 2024-12-27 | End: 2024-12-27

## 2024-12-27 RX ORDER — CEFDINIR 300 MG/1
300 CAPSULE ORAL EVERY 12 HOURS SCHEDULED
Qty: 14 CAPSULE | Refills: 0 | Status: SHIPPED | OUTPATIENT
Start: 2024-12-27 | End: 2024-12-27

## 2024-12-27 RX ORDER — METRONIDAZOLE 500 MG/100ML
500 INJECTION, SOLUTION INTRAVENOUS ONCE
Status: COMPLETED | OUTPATIENT
Start: 2024-12-27 | End: 2024-12-27

## 2024-12-27 RX ADMIN — VANCOMYCIN HYDROCHLORIDE 1750 MG: 5 INJECTION, POWDER, LYOPHILIZED, FOR SOLUTION INTRAVENOUS at 23:59

## 2024-12-27 RX ADMIN — METRONIDAZOLE 500 MG: 500 INJECTION, SOLUTION INTRAVENOUS at 23:04

## 2024-12-27 RX ADMIN — DEXTROSE 2000 MG: 50 INJECTION, SOLUTION INTRAVENOUS at 22:38

## 2024-12-27 RX ADMIN — ACETAMINOPHEN 1000 MG: 1000 INJECTION, SOLUTION INTRAVENOUS at 22:38

## 2024-12-27 RX ADMIN — TETRACAINE HYDROCHLORIDE 2 DROP: 5 SOLUTION OPHTHALMIC at 10:16

## 2024-12-27 RX ADMIN — KETOROLAC TROMETHAMINE 15 MG: 30 INJECTION, SOLUTION INTRAMUSCULAR; INTRAVENOUS at 10:16

## 2024-12-27 RX ADMIN — DEXTROSE 1000 MG: 50 INJECTION, SOLUTION INTRAVENOUS at 10:15

## 2024-12-27 RX ADMIN — FLUORESCEIN SODIUM 1 STRIP: 1 STRIP OPHTHALMIC at 10:16

## 2024-12-27 RX ADMIN — IOHEXOL 85 ML: 350 INJECTION, SOLUTION INTRAVENOUS at 11:20

## 2024-12-27 NOTE — DISCHARGE INSTRUCTIONS
Please come back to the ED if your symptoms are getting worse.    Please follow-up with the ophthalmologist.

## 2024-12-27 NOTE — ED ATTENDING ATTESTATION
12/27/2024  ICarter DO, saw and evaluated the patient. I have discussed the patient with the resident/non-physician practitioner and agree with the resident's/non-physician practitioner's findings, Plan of Care, and MDM as documented in the resident's/non-physician practitioner's note, except where noted. All available labs and Radiology studies were reviewed.  I was present for key portions of any procedure(s) performed by the resident/non-physician practitioner and I was immediately available to provide assistance.       At this point I agree with the current assessment done in the Emergency Department.  I have conducted an independent evaluation of this patient a history and physical is as follows:    28 yo F with a 2-3 day history of right eye swelling and pain, redness, clear drainage. She wears contacts, denies trauma, no fevers or chills. C/o worsening pain and swelling gradually, blurred vision.    PE:  The patient is well appearing, non-toxic, in NAD. Head: normocephalic, atraumatic. HEENT: mucous membranes moist.  Eyes: the right eye has moderate periorbital swelling, EOMI but with some discomfort to vertical gaze. + clear drainage. The right eye is red, injected.  Has tenderness and swelling to the inferior ands superior eyelid, right cheek. Lungs: CTA b/l, no resp distress. Heart: RRR. No M/R/G. Abdomen: NT, ND, no R/R/G. Neuro: CN2-12 intact, GCS 15. Normal strength and sensation, normal speech and gait. Cap refill < 2 sec, skin warm and dry. No rashes or lesions.    Periorbital vs. Orbital cellulitis. Given has some sx concerning for orbital cellulitis, will obtain CT of orbits, labs, IV abx for now. Dispo pending CT.    ED Course         Critical Care Time  Procedures

## 2024-12-27 NOTE — ED PROVIDER NOTES
ED Disposition       None          Assessment & Plan   {Hyperlinks  Risk Stratification - NIHSS - HEART SCORE - Fill out sepsis note and make sure you call [phone] if severe or septic shock:[id number]}    Medical Decision Making  Patient is a 29-year-old female who presented to the ED for right eyelid swelling.  The patient stated that she initially noticed some pain over the right eye 2 days ago without any aesthetic changes to the area, but yesterday she started having right eyelid swelling and redness as well as worsening of the pain.  Since then the pain has been progressively worsening as well as the swelling and redness.  She also endorses frequent tearing and woke up this morning unable to open the eye due to crusting.  Was able to open eye after cleaning it but is only able to open the eye a small amount due to the edema. She states that this is never happened before. The patient wears contact lenses and has removed them ever since the swelling started.  She denies any fevers or chills.  She does endorse some fatigue but is currently experiencing Crohn's flare and is unsure if the fatigue is related, however the fatigue was there before the swelling.  She states that the vision of the right eye is mildly blurry but she is unsure if this is due to the tearing.    Ddx includes but is not limited to preseptal cellulitis, orbital cellulitis, dacrocystitis.             Medications - No data to display    ED Risk Strat Scores                                              History of Present Illness   {Hyperlinks  History (Med, Surg, Fam, Social) - Current Medications - Allergies  :[id number]}    Chief Complaint   Patient presents with   • Eye Swelling     Patient reports painful right eye swelling since yesterday       Past Medical History:   Diagnosis Date   • Chronic back pain    • Colitis    • History of transfusion    • Multiple abrasions 2013   • Proteinuria    • Shoulder disorder 11/15/2018   • Urinary tract  infection     pylonephritis      Past Surgical History:   Procedure Laterality Date   • COLONOSCOPY     • DILATION AND CURETTAGE OF UTERUS N/A 2021    Procedure: DILATATION AND CURETTAGE (D&C);  Surgeon: Rona Puga MD;  [facility]: [facility];  Service: Obstetrics   • EXAMINATION UNDER ANESTHESIA N/A 2021    Procedure: EXAM UNDER ANESTHESIA (EUA) WITH INSERTION OF BAKRI BALLOON;  Surgeon: Rona Puga MD;  Location: [facility] ;  Service: Obstetrics   • TONSILLECTOMY     • UPPER GASTROINTESTINAL ENDOSCOPY     • WISDOM TOOTH EXTRACTION        Family History   Problem Relation Age of Onset   • Hypertension Mother    • Arthritis Mother    • Depression Mother    • COPD Mother    • Gestational diabetes Mother    • COPD Father    • Depression Sister    • Anxiety disorder Sister    • Cholelithiasis Sister    • Depression Brother    • Bipolar disorder Brother    • Depression Brother    • Depression Brother    • No Known Problems Maternal Grandmother    • COPD Maternal Grandfather    • Cancer Maternal Grandfather    • Lung cancer Paternal Grandmother    • Cancer Paternal Grandmother    • Kidney cancer Paternal Grandmother    • Heart disease Paternal Grandfather    • Skin cancer Maternal Uncle       Social History     Tobacco Use   • Smoking status: Former     Current packs/day: 0.00     Types: Cigarettes     Quit date: 2020     Years since quittin.0   • Smokeless tobacco: Never   • Tobacco comments:     use Vape   Vaping Use   • Vaping status: Every Day   Substance Use Topics   • Alcohol use: Not Currently   • Drug use: Not Currently     Types: Marijuana     Comment: hasn't used since before pregnancy      E-Cigarette/Vaping   • E-Cigarette Use Current Every Day User       E-Cigarette/Vaping Substances   • Nicotine No    • THC No    • CBD No    • Flavoring No    • Other No    • Unknown No       I have reviewed and agree with the history as documented.     Patient is a 29-year-old female who  presented to the ED for right eyelid swelling.  The patient stated that she initially noticed some pain over the right eye 2 days ago without any aesthetic changes to the area, but yesterday she started having right eyelid swelling and redness as well as worsening of the pain.  Since then the pain has been progressively worsening as well as the swelling and redness.  She also endorses frequent tearing and woke up this morning unable to open the eye due to crusting.  Was able to open eye after cleaning it but is only able to open the eye a small amount due to the edema. She states that this is never happened before. The patient wears contact lenses and has removed them ever since the swelling started.  She denies any fevers or chills.  She does endorse some fatigue but is currently experiencing Crohn's flare and is unsure if the fatigue is related, however the fatigue was there before the swelling.  She states that the vision of the right eye is mildly blurry but she is unsure if this is due to the tearing.      Review of Systems   Constitutional:  Negative for chills, fatigue and fever.   Eyes:  Positive for pain, redness and visual disturbance.        Pertinent positives listed here are of the right eye only.   Skin:  Negative for color change and pallor.   Psychiatric/Behavioral: Negative.             Objective   {Hyperlinks  Historical Vitals - Historical Labs - Chart Review/Microbiology - Last Echo - Code Status  :[id number]}    ED Triage Vitals [12/27/24 0847]   Temperature Pulse Blood Pressure Respirations SpO2 Patient Position - Orthostatic VS   98.1 °F (36.7 °C) 94 134/81 18 98 % Lying      Temp Source Heart Rate Source BP Location FiO2 (%) Pain Score    Oral Monitor Right arm -- --      Vitals      Date and Time Temp Pulse SpO2 Resp BP Pain Score FACES Pain Rating User   12/27/24 0847 98.1 °F (36.7 °C) 94 98 % 18 134/81 -- -- BS            Physical Exam  On examination:  The patient is awake, alert and  oriented  HEENT: Normocephalic/atraumatic  External examination of the ears is unremarkable  Upper eyelid with significant edema and erythema  Pupils are equal round and reactive to light.  There is conjunctival injection  There is pain with EOM movement, but patient is able to range EOM freely  No chemosis, proptosis  Nares are patent without rhinorrhea.  The oropharynx is moist without injection  The neck is supple  Lungs: Clear to auscultation bilaterally  Heart: Regular without murmurs rubs or gallops  Abdomen: Soft and nontender. There are positive bowel sounds. there is no rebound or guarding  Musculoskeletal: Normal range of motion with grossly normal strength  Neuro: Cranial nerves II through XII grossly intact. Nonfocal exam  Skin: No rash noted  Psych: Mood and affect normal      Results Reviewed       None            No orders to display       Procedures    ED Medication and Procedure Management   Prior to Admission Medications   Prescriptions Last Dose Informant Patient Reported? Taking?   Ferrous Sulfate (IRON PO)  Self Yes No   Sig: Take 1 tablet every day by oral route.   Patient not taking: Reported on 2024   cetirizine (ZyrTEC) 10 mg tablet   No No   Sig: Take 1 tablet (10 mg total) by mouth daily   ergocalciferol (ERGOCALCIFEROL) 1.25 MG (96376 UT) capsule   No No   Sig: Take 1 capsule (50,000 Units total) by mouth once a week   Patient not taking: Reported on 2024   fluticasone (FLONASE) 50 mcg/act nasal spray   No No   Si spray into each nostril daily   Patient not taking: Reported on 2024   inFLIXimab (REMICADE) 100 mg   Yes No   Sig: Inject into a catheter in a vein   ketoconazole (NIZORAL) 2 % shampoo  Self Yes No   Sig: as needed   pantoprazole (PROTONIX) 40 mg tablet   No No   Sig: TAKE 1 TABLET DAILY   phentermine 30 MG capsule   No No   Sig: Take 1 capsule (30 mg total) by mouth every morning   topiramate (TOPAMAX) 25 mg tablet   No No   Sig: Take 1 tablet (25 mg  total) by mouth 2 (two) times a day      Facility-Administered Medications: None     Patient's Medications   Discharge Prescriptions    No medications on file     No discharge procedures on file.  ED SEPSIS DOCUMENTATION          32 %      Monocytes % 8 %      Eosinophils Relative 1 %      Basophils Relative 1 %      Absolute Neutrophils 5.63 Thousands/µL      Absolute Immature Grans 0.02 Thousand/uL      Absolute Lymphocytes 3.11 Thousands/µL      Absolute Monocytes 0.76 Thousand/µL      Eosinophils Absolute 0.07 Thousand/µL      Basophils Absolute 0.09 Thousands/µL             CT Orbits w contrast   Final Interpretation by Saji Hayward MD (12/27 1156)      Right-sided periorbital soft tissue swelling consistent with recent septal cellulitis. Mild adjacent episcleral enhancement is suggestive of episcleritis. Otherwise no juan postseptal extension of disease identified. No subperiosteal collections are    seen.         Workstation performed: [id number]             Procedures    ED Medication and Procedure Management   Prior to Admission Medications   Prescriptions Last Dose Informant Patient Reported? Taking?   cetirizine (ZyrTEC) 10 mg tablet   No No   Sig: Take 1 tablet (10 mg total) by mouth daily   inFLIXimab (REMICADE) 100 mg   Yes No   Sig: Inject into a catheter in a vein   ketoconazole (NIZORAL) 2 % shampoo  Self Yes No   Sig: as needed   pantoprazole (PROTONIX) 40 mg tablet   No No   Sig: TAKE 1 TABLET DAILY   phentermine 30 MG capsule   No No   Sig: Take 1 capsule (30 mg total) by mouth every morning   topiramate (TOPAMAX) 25 mg tablet   No No   Sig: Take 1 tablet (25 mg total) by mouth 2 (two) times a day      Facility-Administered Medications: None     Discharge Medication List as of 12/27/2024 12:19 PM        CONTINUE these medications which have CHANGED    Details   cefdinir (OMNICEF) 300 mg capsule Take 1 capsule (300 mg total) by mouth every 12 (twelve) hours for 7 days, Starting Fri 12/27/2024, Until Fri 1/3/2025, Normal      sulfamethoxazole-trimethoprim (BACTRIM DS) 800-160 mg per tablet Take 1 tablet by mouth 2 (two) times a day for 7 days smx-tmp DS (BACTRIM) 800-160 mg tabs (1tab q12 D10), Starting Fri 12/27/2024,  Until Fri 1/3/2025, Normal           CONTINUE these medications which have NOT CHANGED    Details   cetirizine (ZyrTEC) 10 mg tablet Take 1 tablet (10 mg total) by mouth daily, Starting Thu 8/15/2024, Normal      inFLIXimab (REMICADE) 100 mg Inject into a catheter in a vein, Historical Med      ketoconazole (NIZORAL) 2 % shampoo as needed, Starting Fri 6/2/2023, Historical Med      pantoprazole (PROTONIX) 40 mg tablet TAKE 1 TABLET DAILY, Starting Tue 12/17/2024, Normal      phentermine 30 MG capsule Take 1 capsule (30 mg total) by mouth every morning, Starting Wed 11/20/2024, Normal      topiramate (TOPAMAX) 25 mg tablet Take 1 tablet (25 mg total) by mouth 2 (two) times a day, Starting Wed 11/20/2024, Normal      ergocalciferol (ERGOCALCIFEROL) 1.25 MG (94369 UT) capsule Take 1 capsule (50,000 Units total) by mouth once a week, Starting Tue 5/14/2024, Normal      Ferrous Sulfate (IRON PO) Take 1 tablet every day by oral route., Historical Med      fluticasone (FLONASE) 50 mcg/act nasal spray 1 spray into each nostril daily, Starting Fri 5/17/2024, Normal             ED SEPSIS DOCUMENTATION   Time reflects when diagnosis was documented in both MDM as applicable and the Disposition within this note       Time User Action Codes Description Comment    12/27/2024 12:03 PM Atif Hernández Add [L03.213] Preseptal cellulitis                  Atif Hernández MD  12/31/24 1112

## 2024-12-28 PROBLEM — L03.213 PRESEPTAL CELLULITIS OF RIGHT EYE: Status: ACTIVE | Noted: 2024-12-28

## 2024-12-28 PROBLEM — H05.019 ORBITAL CELLULITIS: Status: ACTIVE | Noted: 2024-12-28

## 2024-12-28 LAB
ANION GAP SERPL CALCULATED.3IONS-SCNC: 8 MMOL/L (ref 4–13)
BASOPHILS # BLD AUTO: 0.07 THOUSANDS/ÂΜL (ref 0–0.1)
BASOPHILS NFR BLD AUTO: 1 % (ref 0–1)
BUN SERPL-MCNC: 13 MG/DL (ref 5–25)
CALCIUM SERPL-MCNC: 8.5 MG/DL (ref 8.4–10.2)
CHLORIDE SERPL-SCNC: 108 MMOL/L (ref 96–108)
CO2 SERPL-SCNC: 23 MMOL/L (ref 21–32)
CREAT SERPL-MCNC: 0.76 MG/DL (ref 0.6–1.3)
EOSINOPHIL # BLD AUTO: 0.11 THOUSAND/ÂΜL (ref 0–0.61)
EOSINOPHIL NFR BLD AUTO: 1 % (ref 0–6)
ERYTHROCYTE [DISTWIDTH] IN BLOOD BY AUTOMATED COUNT: 13.9 % (ref 11.6–15.1)
GFR SERPL CREATININE-BSD FRML MDRD: 107 ML/MIN/1.73SQ M
GLUCOSE SERPL-MCNC: 112 MG/DL (ref 65–140)
HCT VFR BLD AUTO: 41 % (ref 34.8–46.1)
HGB BLD-MCNC: 13.1 G/DL (ref 11.5–15.4)
IMM GRANULOCYTES # BLD AUTO: 0.03 THOUSAND/UL (ref 0–0.2)
IMM GRANULOCYTES NFR BLD AUTO: 0 % (ref 0–2)
LYMPHOCYTES # BLD AUTO: 3.67 THOUSANDS/ÂΜL (ref 0.6–4.47)
LYMPHOCYTES NFR BLD AUTO: 41 % (ref 14–44)
MCH RBC QN AUTO: 28.4 PG (ref 26.8–34.3)
MCHC RBC AUTO-ENTMCNC: 32 G/DL (ref 31.4–37.4)
MCV RBC AUTO: 89 FL (ref 82–98)
MONOCYTES # BLD AUTO: 0.6 THOUSAND/ÂΜL (ref 0.17–1.22)
MONOCYTES NFR BLD AUTO: 7 % (ref 4–12)
NEUTROPHILS # BLD AUTO: 4.44 THOUSANDS/ÂΜL (ref 1.85–7.62)
NEUTS SEG NFR BLD AUTO: 50 % (ref 43–75)
NRBC BLD AUTO-RTO: 0 /100 WBCS
PLATELET # BLD AUTO: 224 THOUSANDS/UL (ref 149–390)
PMV BLD AUTO: 11.6 FL (ref 8.9–12.7)
POTASSIUM SERPL-SCNC: 3.7 MMOL/L (ref 3.5–5.3)
RBC # BLD AUTO: 4.61 MILLION/UL (ref 3.81–5.12)
SODIUM SERPL-SCNC: 139 MMOL/L (ref 135–147)
WBC # BLD AUTO: 8.92 THOUSAND/UL (ref 4.31–10.16)

## 2024-12-28 PROCEDURE — 80048 BASIC METABOLIC PNL TOTAL CA: CPT | Performed by: NURSE PRACTITIONER

## 2024-12-28 PROCEDURE — 85025 COMPLETE CBC W/AUTO DIFF WBC: CPT | Performed by: NURSE PRACTITIONER

## 2024-12-28 PROCEDURE — 99223 1ST HOSP IP/OBS HIGH 75: CPT

## 2024-12-28 RX ORDER — MINERAL OIL AND PETROLATUM 150; 830 MG/G; MG/G
OINTMENT OPHTHALMIC
Status: DISCONTINUED | OUTPATIENT
Start: 2024-12-28 | End: 2024-12-29 | Stop reason: HOSPADM

## 2024-12-28 RX ORDER — TOPIRAMATE 25 MG/1
25 TABLET, FILM COATED ORAL 2 TIMES DAILY
Status: DISCONTINUED | OUTPATIENT
Start: 2024-12-28 | End: 2024-12-29 | Stop reason: HOSPADM

## 2024-12-28 RX ORDER — KETOROLAC TROMETHAMINE 30 MG/ML
15 INJECTION, SOLUTION INTRAMUSCULAR; INTRAVENOUS EVERY 6 HOURS PRN
Status: DISCONTINUED | OUTPATIENT
Start: 2024-12-28 | End: 2024-12-29 | Stop reason: HOSPADM

## 2024-12-28 RX ORDER — ACETAMINOPHEN 325 MG/1
650 TABLET ORAL EVERY 6 HOURS PRN
Status: DISCONTINUED | OUTPATIENT
Start: 2024-12-28 | End: 2024-12-29 | Stop reason: HOSPADM

## 2024-12-28 RX ORDER — PANTOPRAZOLE SODIUM 40 MG/1
40 TABLET, DELAYED RELEASE ORAL
Status: DISCONTINUED | OUTPATIENT
Start: 2024-12-28 | End: 2024-12-29 | Stop reason: HOSPADM

## 2024-12-28 RX ORDER — PREDNISONE 20 MG/1
40 TABLET ORAL DAILY
Status: DISCONTINUED | OUTPATIENT
Start: 2024-12-29 | End: 2024-12-28

## 2024-12-28 RX ORDER — PHENTERMINE HYDROCHLORIDE 30 MG/1
30 CAPSULE ORAL EVERY MORNING
Status: DISCONTINUED | OUTPATIENT
Start: 2024-12-28 | End: 2024-12-29 | Stop reason: HOSPADM

## 2024-12-28 RX ORDER — DIPHENHYDRAMINE HYDROCHLORIDE 50 MG/ML
12.5 INJECTION INTRAMUSCULAR; INTRAVENOUS ONCE
Status: COMPLETED | OUTPATIENT
Start: 2024-12-28 | End: 2024-12-28

## 2024-12-28 RX ORDER — METRONIDAZOLE 500 MG/1
500 TABLET ORAL EVERY 8 HOURS SCHEDULED
Status: DISCONTINUED | OUTPATIENT
Start: 2024-12-28 | End: 2024-12-28

## 2024-12-28 RX ORDER — PREDNISONE 20 MG/1
40 TABLET ORAL DAILY
Status: DISCONTINUED | OUTPATIENT
Start: 2024-12-28 | End: 2024-12-29 | Stop reason: HOSPADM

## 2024-12-28 RX ORDER — LORATADINE 10 MG/1
10 TABLET ORAL DAILY
Status: DISCONTINUED | OUTPATIENT
Start: 2024-12-28 | End: 2024-12-29 | Stop reason: HOSPADM

## 2024-12-28 RX ADMIN — KETOROLAC TROMETHAMINE 15 MG: 30 INJECTION, SOLUTION INTRAMUSCULAR; INTRAVENOUS at 05:33

## 2024-12-28 RX ADMIN — CEFTRIAXONE SODIUM 2000 MG: 10 INJECTION, POWDER, FOR SOLUTION INTRAVENOUS at 22:47

## 2024-12-28 RX ADMIN — VANCOMYCIN HYDROCHLORIDE 1250 MG: 5 INJECTION, POWDER, LYOPHILIZED, FOR SOLUTION INTRAVENOUS at 20:15

## 2024-12-28 RX ADMIN — METRONIDAZOLE 500 MG: 500 TABLET ORAL at 05:33

## 2024-12-28 RX ADMIN — METRONIDAZOLE 500 MG: 500 TABLET ORAL at 13:30

## 2024-12-28 RX ADMIN — DIPHENHYDRAMINE HYDROCHLORIDE 12.5 MG: 50 INJECTION, SOLUTION INTRAMUSCULAR; INTRAVENOUS at 01:14

## 2024-12-28 RX ADMIN — LORATADINE 10 MG: 10 TABLET ORAL at 10:34

## 2024-12-28 RX ADMIN — VANCOMYCIN HYDROCHLORIDE 1250 MG: 5 INJECTION, POWDER, LYOPHILIZED, FOR SOLUTION INTRAVENOUS at 10:34

## 2024-12-28 RX ADMIN — PREDNISONE 40 MG: 20 TABLET ORAL at 17:02

## 2024-12-28 RX ADMIN — PANTOPRAZOLE SODIUM 40 MG: 40 TABLET, DELAYED RELEASE ORAL at 05:33

## 2024-12-28 NOTE — PLAN OF CARE
Problem: PAIN - ADULT  Goal: Verbalizes/displays adequate comfort level or baseline comfort level  Description: Interventions:  - Encourage patient to monitor pain and request assistance  - Assess pain using appropriate pain scale  - Administer analgesics based on type and severity of pain and evaluate response  - Implement non-pharmacological measures as appropriate and evaluate response  - Consider cultural and social influences on pain and pain management  - Notify physician/advanced practitioner if interventions unsuccessful or patient reports new pain  Outcome: Progressing     Problem: INFECTION - ADULT  Goal: Absence or prevention of progression during hospitalization  Description: INTERVENTIONS:  - Assess and monitor for signs and symptoms of infection  - Monitor lab/diagnostic results  - Monitor all insertion sites, i.e. indwelling lines, tubes, and drains  - Monitor endotracheal if appropriate and nasal secretions for changes in amount and color  - Donald appropriate cooling/warming therapies per order  - Administer medications as ordered  - Instruct and encourage patient and family to use good hand hygiene technique  - Identify and instruct in appropriate isolation precautions for identified infection/condition  Outcome: Progressing  Goal: Absence of fever/infection during neutropenic period  Description: INTERVENTIONS:  - Monitor WBC    Outcome: Progressing     Problem: SAFETY ADULT  Goal: Patient will remain free of falls  Description: INTERVENTIONS:  - Educate patient/family on patient safety including physical limitations  - Instruct patient to call for assistance with activity   - Consult OT/PT to assist with strengthening/mobility   - Keep Call bell within reach  - Keep bed low and locked with side rails adjusted as appropriate  - Keep care items and personal belongings within reach  - Initiate and maintain comfort rounds  - Make Fall Risk Sign visible to staff  - Offer Toileting every  Hours,  in advance of need  - Initiate/Maintain alarm  - Obtain necessary fall risk management equipment:   - Apply yellow socks and bracelet for high fall risk patients  - Consider moving patient to room near nurses station  Outcome: Progressing  Goal: Maintain or return to baseline ADL function  Description: INTERVENTIONS:  -  Assess patient's ability to carry out ADLs; assess patient's baseline for ADL function and identify physical deficits which impact ability to perform ADLs (bathing, care of mouth/teeth, toileting, grooming, dressing, etc.)  - Assess/evaluate cause of self-care deficits   - Assess range of motion  - Assess patient's mobility; develop plan if impaired  - Assess patient's need for assistive devices and provide as appropriate  - Encourage maximum independence but intervene and supervise when necessary  - Involve family in performance of ADLs  - Assess for home care needs following discharge   - Consider OT consult to assist with ADL evaluation and planning for discharge  - Provide patient education as appropriate  Outcome: Progressing  Goal: Maintains/Returns to pre admission functional level  Description: INTERVENTIONS:  - Perform AM-PAC 6 Click Basic Mobility/ Daily Activity assessment daily.  - Set and communicate daily mobility goal to care team and patient/family/caregiver.   - Collaborate with rehabilitation services on mobility goals if consulted  - Perform Range of Motion  times a day.  - Reposition patient every  hours.  - Dangle patient  times a day  - Stand patient  times a day  - Ambulate patient  times a day  - Out of bed to chair  times a day   - Out of bed for meals  times a day  - Out of bed for toileting  - Record patient progress and toleration of activity level   Outcome: Progressing     Problem: DISCHARGE PLANNING  Goal: Discharge to home or other facility with appropriate resources  Description: INTERVENTIONS:  - Identify barriers to discharge w/patient and caregiver  - Arrange for  needed discharge resources and transportation as appropriate  - Identify discharge learning needs (meds, wound care, etc.)  - Arrange for interpretive services to assist at discharge as needed  - Refer to Case Management Department for coordinating discharge planning if the patient needs post-hospital services based on physician/advanced practitioner order or complex needs related to functional status, cognitive ability, or social support system  Outcome: Progressing     Problem: Knowledge Deficit  Goal: Patient/family/caregiver demonstrates understanding of disease process, treatment plan, medications, and discharge instructions  Description: Complete learning assessment and assess knowledge base.  Interventions:  - Provide teaching at level of understanding  - Provide teaching via preferred learning methods  Outcome: Progressing

## 2024-12-28 NOTE — ASSESSMENT & PLAN NOTE
Right eyelid swelling, erythema, discharge began 12/25.  DC from ED 12/27 with cefdinir and bactrim with worsening symptoms  CT right periorbital soft tissue swelling consistent with recent septal cellulitis, episcleritis  Antibiotics: ceftriaxone, flagyl, vanco  Red man reaction with vanco, mild pruritus and erythema to bilateral hands.  Improved with benadryl and decreased rate.  Reviewed with pt and nursing.  Monitor closely.  Pharmacy consult

## 2024-12-28 NOTE — ASSESSMENT & PLAN NOTE
Reports she is in flare currently.  Has loose stools, but no bleeding.  No excessive stools but has cramping.  Maintained on remicade as op, next due 1/6.  Consider GI for worsening symptoms

## 2024-12-28 NOTE — UTILIZATION REVIEW
Initial Clinical Review    Admission: Date/Time/Statement:   Admission Orders (From admission, onward)       Ordered        12/27/24 3835  Place in Observation  Once                          Orders Placed This Encounter   Procedures    Place in Observation     Standing Status:   Standing     Number of Occurrences:   1     Level of Care:   Med Surg [16]     ED Arrival Information       Expected   -    Arrival   12/27/2024 19:30    Acuity   Urgent              Means of arrival   Walk-In    Escorted by   Self    Service   Hospitalist    Admission type   Emergency              Arrival complaint   eye pain, swollen             Chief Complaint   Patient presents with    Eye Problem     Pt was seen in ER today for right eye swelling, pain, redness. Reports yellow discharge from right eye and headache. States she was instructed to come back if symptoms worsened. States pain has worsened. Was given Toradol while she was seen.        Initial Presentation: 29 y.o. female to ED re presents as Observation admission due to pre septal cellulitis of R eye  PMH  crohn's disease  presents with worsening right eye swelling, pain, discharge.  Seen in the ED earlier on 12/27.  Underwent CT which showed septal cellulitis, episcleritis.  She was discharged from the ED with cefdinir and bactrim however had worsening pain.  Has some blurred vision on and off.    EXAM  R eye discharge & hordeolum, R eye injected  Continue IV ceftriaxone, flagyl, vancomycin.  May need optho involvement or repeat imaging if failure to improve     Anticipated Length of Stay/Certification Statement: Patient will be admitted on an observation basis with an anticipated length of stay of less than 2 midnights secondary to IV abx.     Date: 12/28/2024   Day 2:   Preseptal cellulitis of R eye  ics: ceftriaxone, flagyl, vanco  Red man reaction with vanco, mild pruritus and erythema to bilateral hands.  Improved with benadryl and decreased rate.  Reviewed with pt and  nursing.  Monitor closely.  Crohns disease  Reports she is in flare currently wo bleeding; + loose stools no excessive stools but + cramping;     ED Treatment-Medication Administration from 12/27/2024 1930 to 12/28/2024 0029         Date/Time Order Dose Route Action     12/27/2024 2359 vancomycin (VANCOCIN) 1,750 mg in sodium chloride 0.9 % 500 mL IVPB 1,750 mg Intravenous New Bag     12/27/2024 2238 ceftriaxone (ROCEPHIN) 2 g/50 mL in dextrose IVPB 2,000 mg Intravenous New Bag     12/27/2024 2304 metroNIDAZOLE (FLAGYL) IVPB (premix) 500 mg 100 mL 500 mg Intravenous New Bag     12/27/2024 2238 acetaminophen (Ofirmev) injection 1,000 mg 1,000 mg Intravenous New Bag            Scheduled Medications:  artificial tear, , Right Eye, HS  cefTRIAXone, 2,000 mg, Intravenous, Q24H  loratadine, 10 mg, Oral, Daily  metroNIDAZOLE, 500 mg, Oral, Q8H FUENTES  pantoprazole, 40 mg, Oral, Early Morning  phentermine, 30 mg, Oral, QAM  topiramate, 25 mg, Oral, BID  vancomycin, 1,250 mg, Intravenous, Q12H      Continuous IV Infusions:     PRN Meds:  acetaminophen, 650 mg, Oral, Q6H PRN  ketorolac, 15 mg, Intravenous, Q6H PRN      ED Triage Vitals   Temperature Pulse Respirations Blood Pressure SpO2 Pain Score   12/27/24 2008 12/27/24 2008 12/27/24 2008 12/27/24 2008 12/27/24 2008 12/28/24 0100   97.9 °F (36.6 °C) 80 18 140/87 98 % No Pain     Weight (last 2 days)       None            Vital Signs (last 3 days)       Date/Time Temp Pulse Resp BP MAP (mmHg) SpO2 O2 Device Patient Position - Orthostatic VS [place] Coma Scale Score Pain    12/28/24 08:25:05 96.8 °F (36 °C) 93 -- 110/78 89 96 % -- -- -- --    12/28/24 08:23:53 -- 95 -- 110/78 89 95 % -- -- -- --    12/28/24 0533 -- -- -- -- -- -- -- -- -- 7    12/28/24 0400 -- -- -- -- -- -- -- -- 15 --    12/28/24 0100 -- -- -- -- -- -- -- -- -- No Pain    12/28/24 00:33:44 97.8 °F (36.6 °C) 82 17 125/88 100 97 % -- -- -- --    12/28/24 0030 -- -- -- -- -- -- -- -- 15 --    12/27/24 2300 -- --  "-- -- -- -- -- -- 15 --    12/27/24 2230 -- 88 18 132/84 102 97 % None (Room air) -- -- --    12/27/24 2223 -- 98 -- -- -- 98 % None (Room air) -- -- --    12/27/24 2008 97.9 °F (36.6 °C) 80 18 140/87 109 98 % None (Room air) Sitting -- --              Pertinent Labs/Diagnostic Test Results:   Radiology:  No orders to display     Cardiology:  No orders to display     GI:  No orders to display           Results from last 7 days   Lab Units 12/28/24  0508 12/27/24  1015   WBC Thousand/uL 8.92 9.68   HEMOGLOBIN g/dL 13.1 13.5   HEMATOCRIT % 41.0 42.0   PLATELETS Thousands/uL 224 209   TOTAL NEUT ABS Thousands/µL 4.44 5.63         Results from last 7 days   Lab Units 12/28/24  0508 12/27/24  1015   SODIUM mmol/L 139 138   POTASSIUM mmol/L 3.7 3.8   CHLORIDE mmol/L 108 105   CO2 mmol/L 23 28   ANION GAP mmol/L 8 5   BUN mg/dL 13 11   CREATININE mg/dL 0.76 0.82   EGFR ml/min/1.73sq m 107 98   CALCIUM mg/dL 8.5 9.1             Results from last 7 days   Lab Units 12/28/24  0508 12/27/24  1015   GLUCOSE RANDOM mg/dL 112 89             No results found for: \"BETA-HYDROXYBUTYRATE\"                                                                                                                                         Past Medical History:   Diagnosis Date    Chronic back pain     Colitis     History of transfusion     Multiple abrasions 2013    Proteinuria     Shoulder disorder 11/15/2018    Urinary tract infection     pylonephritis     Present on Admission:   Crohn's disease of small intestine with complication (HCC)   PUD (peptic ulcer disease)      Admitting Diagnosis: Orbital cellulitis on right [H05.011]  Age/Sex: 29 y.o. female    Network Utilization Review Department  ATTENTION: Please call with any questions or concerns to [phone] and carefully listen to the prompts so that you are directed to the right person. All voicemails are confidential.   For Discharge needs, contact Care Management DC Support Team at " [phone] opt. 2  Send all requests for admission clinical reviews, approved or denied determinations and any other requests to dedicated fax number below belonging to the campus where the patient is receiving treatment. List of dedicated fax numbers for the Facilities:  FACILITY NAME UR FAX NUMBER   ADMISSION DENIALS (Administrative/Medical Necessity) [phone]   DISCHARGE SUPPORT TEAM (NETWORK) [phone]   PARENT CHILD HEALTH (Maternity/NICU/Pediatrics) [phone]   [facility] [phone]   [facility] [phone]   [facility] [phone]   [facility] [phone]   [facility] [phone]   [facility] [phone]   [facility] [phone]   [facility] [phone]   [facility] [phone]   [facility] [phone]   [facility] [phone]   [facility] [phone]

## 2024-12-28 NOTE — PLAN OF CARE
Problem: PAIN - ADULT  Goal: Verbalizes/displays adequate comfort level or baseline comfort level  Description: Interventions:  - Encourage patient to monitor pain and request assistance  - Assess pain using appropriate pain scale  - Administer analgesics based on type and severity of pain and evaluate response  - Implement non-pharmacological measures as appropriate and evaluate response  - Consider cultural and social influences on pain and pain management  - Notify physician/advanced practitioner if interventions unsuccessful or patient reports new pain  12/28/2024 1403 by Christal Mckeon LPN  Outcome: Progressing  12/28/2024 1400 by Christal Mckeon LPN  Outcome: Progressing     Problem: INFECTION - ADULT  Goal: Absence or prevention of progression during hospitalization  Description: INTERVENTIONS:  - Assess and monitor for signs and symptoms of infection  - Monitor lab/diagnostic results  - Monitor all insertion sites, i.e. indwelling lines, tubes, and drains  - Monitor endotracheal if appropriate and nasal secretions for changes in amount and color  - [place] appropriate cooling/warming therapies per order  - Administer medications as ordered  - Instruct and encourage patient and family to use good hand hygiene technique  - Identify and instruct in appropriate isolation precautions for identified infection/condition  12/28/2024 1403 by Christal Mckeon LPN  Outcome: Progressing  12/28/2024 1400 by Christal Mckeon LPN  Outcome: Progressing  Goal: Absence of fever/infection during neutropenic period  Description: INTERVENTIONS:  - Monitor WBC    12/28/2024 1403 by Christal Mckeon LPN  Outcome: Progressing  12/28/2024 1400 by Christal Mckeon LPN  Outcome: Progressing     Problem: SAFETY ADULT  Goal: Patient will remain free of falls  Description: INTERVENTIONS:  - Educate patient/family on patient safety including physical limitations  - Instruct patient to call for assistance with activity   -  Consult OT/PT to assist with strengthening/mobility   - Keep Call bell within reach  - Keep bed low and locked with side rails adjusted as appropriate  - Keep care items and personal belongings within reach  - Initiate and maintain comfort rounds  - Make Fall Risk Sign visible to staff  - Offer Toileting every  Hours, in advance of need  - Initiate/Maintain alarm  - Obtain necessary fall risk management equipment:   - Apply yellow socks and bracelet for high fall risk patients  - Consider moving patient to room near nurses station  12/28/2024 1403 by Christal Mckeon LPN  Outcome: Progressing  12/28/2024 1400 by Christal Mckeon LPN  Outcome: Progressing  Goal: Maintain or return to baseline ADL function  Description: INTERVENTIONS:  -  Assess patient's ability to carry out ADLs; assess patient's baseline for ADL function and identify physical deficits which impact ability to perform ADLs (bathing, care of mouth/teeth, toileting, grooming, dressing, etc.)  - Assess/evaluate cause of self-care deficits   - Assess range of motion  - Assess patient's mobility; develop plan if impaired  - Assess patient's need for assistive devices and provide as appropriate  - Encourage maximum independence but intervene and supervise when necessary  - Involve family in performance of ADLs  - Assess for home care needs following discharge   - Consider OT consult to assist with ADL evaluation and planning for discharge  - Provide patient education as appropriate  12/28/2024 1403 by Christal Mckeon LPN  Outcome: Progressing  12/28/2024 1400 by Christal Mckeon LPN  Outcome: Progressing  Goal: Maintains/Returns to pre admission functional level  Description: INTERVENTIONS:  - Perform AM-PAC 6 Click Basic Mobility/ Daily Activity assessment daily.  - Set and communicate daily mobility goal to care team and patient/family/caregiver.   - Collaborate with rehabilitation services on mobility goals if consulted  - Perform Range of Motion   times a day.  - Reposition patient every  hours.  - Dangle patient  times a day  - Stand patient  times a day  - Ambulate patient  times a day  - Out of bed to chair  times a day   - Out of bed for meals  times a day  - Out of bed for toileting  - Record patient progress and toleration of activity level   12/28/2024 1403 by Christal Mckeon LPN  Outcome: Progressing  12/28/2024 1400 by Christal Mckeon LPN  Outcome: Progressing     Problem: DISCHARGE PLANNING  Goal: Discharge to home or other facility with appropriate resources  Description: INTERVENTIONS:  - Identify barriers to discharge w/patient and caregiver  - Arrange for needed discharge resources and transportation as appropriate  - Identify discharge learning needs (meds, wound care, etc.)  - Arrange for interpretive services to assist at discharge as needed  - Refer to Case Management Department for coordinating discharge planning if the patient needs post-hospital services based on physician/advanced practitioner order or complex needs related to functional status, cognitive ability, or social support system  12/28/2024 1403 by Christal Mckeon LPN  Outcome: Progressing  12/28/2024 1400 by Christal Mckeon LPN  Outcome: Progressing     Problem: Knowledge Deficit  Goal: Patient/family/caregiver demonstrates understanding of disease process, treatment plan, medications, and discharge instructions  Description: Complete learning assessment and assess knowledge base.  Interventions:  - Provide teaching at level of understanding  - Provide teaching via preferred learning methods  12/28/2024 1403 by Christal Mckeon LPN  Outcome: Progressing  12/28/2024 1400 by Christal Mckeon LPN  Outcome: Progressing

## 2024-12-28 NOTE — PROGRESS NOTES
Vickie Stock is a 29 y.o. female who is currently ordered Vancomycin IV with management by the Pharmacy Consult service.  Relevant clinical data and objective / subjective history reviewed.  Vancomycin Assessment:  Indication and Goal AUC/Trough: Soft tissue (goal -600, trough >10)  Clinical Status:  new  Renal Function:  SCr: 0.82 mg/dL  CrCl: 120.7 mL/min  Renal replacement: Not on dialysis  Days of Therapy: 1  Current Dose: 1750mg (25mg/kg) loading dose given  Vancomycin Plan:  New Dosinmg Q 12hrs at decreased rate to avoid red man synd  Estimated AUC: 438 mcg*hr/mL  Estimated Trough: 11.6 mcg/mL  Next Level:  @ 0600  Renal Function Monitoring: Daily BMP and UOP  Pharmacy will continue to follow closely for s/sx of nephrotoxicity, infusion reactions and appropriateness of therapy.  BMP and CBC will be ordered per protocol. We will continue to follow the patient’s culture results and clinical progress daily.    Sun Pacheco, Pharmacist

## 2024-12-28 NOTE — H&P
H&P - Hospitalist   Name: Vickie Stock 29 y.o. female I MRN: [id number]  Unit/Bed#: S -01 I Date of Admission: 12/27/2024   Date of Service: 12/28/2024 I Hospital Day: 0     Assessment & Plan  Preseptal cellulitis of right eye  Right eyelid swelling, erythema, discharge began 12/25.  DC from ED 12/27 with cefdinir and bactrim with worsening symptoms  CT right periorbital soft tissue swelling consistent with recent septal cellulitis, episcleritis  Antibiotics: ceftriaxone, flagyl, vanco  Red man reaction with vanco, mild pruritus and erythema to bilateral hands.  Improved with benadryl and decreased rate.  Reviewed with pt and nursing.  Monitor closely.  Pharmacy consult  Crohn's disease of small intestine with complication (HCC)  Reports she is in flare currently.  Has loose stools, but no bleeding.  No excessive stools but has cramping.  Maintained on remicade as op, next due 1/6.  Consider GI for worsening symptoms  PUD (peptic ulcer disease)  Continue PPI      VTE Pharmacologic Prophylaxis: VTE Score: 2 Low Risk (Score 0-2) - Encourage Ambulation.  Code Status: Level 1 - Full Code full  Discussion with family: Patient declined call to .     Anticipated Length of Stay: Patient will be admitted on an observation basis with an anticipated length of stay of less than 2 midnights secondary to IV abx.    History of Present Illness   Chief Complaint: swelling right eye    Vicike Stock is a 29 y.o. female with a PMH of crohn's disease who presents with worsening right eye swelling, pain, discharge.  Patient was seen in the ED earlier on 12/27.  She underwent CT which showed septal cellulitis, episcleritis.  She was discharged from the ED with cefdinir and bactrim however had worsening pain.  No SIRS criteria.  Has some blurred vision on and off.  Will continue ceftriaxone, flagyl, vancomycin.  May need optho involvement or repeat imaging if failure to improve.    Review of Systems   Eyes:   Positive for pain, discharge and redness.   Gastrointestinal:  Positive for abdominal pain. Negative for abdominal distention, anal bleeding, blood in stool, constipation, diarrhea, nausea, rectal pain and vomiting.        + loose stool   All other systems reviewed and are negative.      Historical Information   Past Medical History:   Diagnosis Date    Chronic back pain     Colitis     History of transfusion     Multiple abrasions     Proteinuria     Shoulder disorder 11/15/2018    Urinary tract infection     pylonephritis     Past Surgical History:   Procedure Laterality Date    COLONOSCOPY      DILATION AND CURETTAGE OF UTERUS N/A 2021    Procedure: DILATATION AND CURETTAGE (D&C);  Surgeon: Rona Puga MD;  [facility];  Service: Obstetrics    EXAMINATION UNDER ANESTHESIA N/A 2021    Procedure: EXAM UNDER ANESTHESIA (EUA) WITH INSERTION OF BAKRI BALLOON;  Surgeon: Rona Puga MD;  [facility];  Service: Obstetrics    TONSILLECTOMY  2009    UPPER GASTROINTESTINAL ENDOSCOPY      WISDOM TOOTH EXTRACTION       Social History     Tobacco Use    Smoking status: Former     Current packs/day: 0.00     Types: Cigarettes     Quit date: 2020     Years since quittin.0    Smokeless tobacco: Never    Tobacco comments:     use Vape   Vaping Use    Vaping status: Former   Substance and Sexual Activity    Alcohol use: Not Currently    Drug use: Not Currently     Types: Marijuana     Comment: hasn't used since before pregnancy    Sexual activity: Yes     E-Cigarette/Vaping    E-Cigarette Use Former User      E-Cigarette/Vaping Substances    Nicotine No     THC No     CBD No     Flavoring No     Other No     Unknown No      Family History   Problem Relation Age of Onset    Hypertension Mother     Arthritis Mother     Depression Mother     COPD Mother     Gestational diabetes Mother     COPD Father     Depression Sister     Anxiety disorder Sister     Cholelithiasis Sister      Depression Brother     Bipolar disorder Brother     Depression Brother     Depression Brother     No Known Problems Maternal Grandmother     COPD Maternal Grandfather     Cancer Maternal Grandfather     Lung cancer Paternal Grandmother     Cancer Paternal Grandmother     Kidney cancer Paternal Grandmother     Heart disease Paternal Grandfather     Skin cancer Maternal Uncle      Social History:  Marital Status: Single   Occupation:   Patient Pre-hospital Living Situation: Home  Patient Pre-hospital Level of Mobility: walks  Patient Pre-hospital Diet Restrictions:     Meds/Allergies   I have reviewed home medications with patient personally.  Prior to Admission medications    Medication Sig Start Date End Date Taking? Authorizing Provider   cefdinir (OMNICEF) 300 mg capsule Take 1 capsule (300 mg total) by mouth every 12 (twelve) hours for 7 days 12/27/24 1/3/25  Atif Hernández MD   cetirizine (ZyrTEC) 10 mg tablet Take 1 tablet (10 mg total) by mouth daily 8/15/24   Ashley Butler MD   ergocalciferol (ERGOCALCIFEROL) 1.25 MG (18279 UT) capsule Take 1 capsule (50,000 Units total) by mouth once a week  Patient not taking: Reported on 11/20/2024 5/14/24   Ashley Butler MD   Ferrous Sulfate (IRON PO) Take 1 tablet every day by oral route.  Patient not taking: Reported on 11/6/2024    Historical Provider, MD   fluticasone (FLONASE) 50 mcg/act nasal spray 1 spray into each nostril daily  Patient not taking: Reported on 11/20/2024 5/17/24   Ashley Butler MD   inFLIXimab (REMICADE) 100 mg Inject into a catheter in a vein    Historical Provider, MD   ketoconazole (NIZORAL) 2 % shampoo as needed 6/2/23   Historical Provider, MD   pantoprazole (PROTONIX) 40 mg tablet TAKE 1 TABLET DAILY 12/17/24   Ashley Butler MD   phentermine 30 MG capsule Take 1 capsule (30 mg total) by mouth every morning 11/20/24   Ashley Butler MD   sulfamethoxazole-trimethoprim (BACTRIM DS) 800-160 mg per tablet Take 1 tablet by mouth 2 (two) times a  day for 7 days smx-tmp DS (BACTRIM) 800-160 mg tabs (1tab q12 D10) 12/27/24 1/3/25  Atif Hernández MD   topiramate (TOPAMAX) 25 mg tablet Take 1 tablet (25 mg total) by mouth 2 (two) times a day 11/20/24   Ashley Butler MD     No Known Allergies    Objective :  Temp:  [97.8 °F (36.6 °C)-98.1 °F (36.7 °C)] 97.8 °F (36.6 °C)  HR:  [80-98] 82  BP: (125-140)/(81-88) 125/88  Resp:  [17-18] 17  SpO2:  [97 %-98 %] 97 %  O2 Device: None (Room air)    Physical Exam  Constitutional:       General: She is not in acute distress.     Appearance: Normal appearance. She is not toxic-appearing.   HENT:      Head: Normocephalic and atraumatic.      Right Ear: External ear normal.      Left Ear: External ear normal.      Nose: Nose normal.      Mouth/Throat:      Pharynx: Oropharynx is clear.   Eyes:      General: No scleral icterus.        Right eye: Discharge and hordeolum present. No foreign body.      Extraocular Movements: Extraocular movements intact.      Conjunctiva/sclera:      Right eye: Right conjunctiva is injected.      Pupils: Pupils are equal, round, and reactive to light.   Cardiovascular:      Rate and Rhythm: Normal rate and regular rhythm.      Pulses: Normal pulses.      Heart sounds: Normal heart sounds.   Pulmonary:      Effort: Pulmonary effort is normal.      Breath sounds: Normal breath sounds.   Abdominal:      General: Bowel sounds are normal.      Palpations: Abdomen is soft.   Musculoskeletal:         General: Normal range of motion.      Cervical back: Normal range of motion.   Skin:     General: Skin is warm.      Capillary Refill: Capillary refill takes less than 2 seconds.   Neurological:      General: No focal deficit present.      Mental Status: She is alert and oriented to person, place, and time.   Psychiatric:         Mood and Affect: Mood normal.          Lines/Drains:            Lab Results: I have reviewed the following results:  Results from last 7 days   Lab Units 12/27/24  1015   WBC  "Thousand/uL 9.68   HEMOGLOBIN g/dL 13.5   HEMATOCRIT % 42.0   PLATELETS Thousands/uL 209   SEGS PCT % 58   LYMPHO PCT % 32   MONO PCT % 8   EOS PCT % 1     Results from last 7 days   Lab Units 12/27/24  1015   SODIUM mmol/L 138   POTASSIUM mmol/L 3.8   CHLORIDE mmol/L 105   CO2 mmol/L 28   BUN mg/dL 11   CREATININE mg/dL 0.82   ANION GAP mmol/L 5   CALCIUM mg/dL 9.1   GLUCOSE RANDOM mg/dL 89             No results found for: \"HGBA1C\"        Imaging Results Review: I reviewed radiology reports from this admission including: CT head.  Other Study Results Review: No additional pertinent studies reviewed.    Administrative Statements   I have spent a total time of   minutes in caring for this patient on the day of the visit/encounter including Diagnostic results, Prognosis, Risks and benefits of tx options, Instructions for management, Patient and family education, Importance of tx compliance, Risk factor reductions, Impressions, Counseling / Coordination of care, Documenting in the medical record, Reviewing / ordering tests, medicine, procedures  , Obtaining or reviewing history  , and Communicating with other healthcare professionals .    ** Please Note: This note has been constructed using a voice recognition system. **    "

## 2024-12-28 NOTE — ED PROVIDER NOTES
"Time reflects when diagnosis was documented in both MDM as applicable and the Disposition within this note       Time User Action Codes Description Comment    12/27/2024 10:44 PM Jim Floyd Add [H05.011] Orbital cellulitis on right           ED Disposition       ED Disposition   Admit    Condition   Stable    Date/Time   Fri Dec 27, 2024 10:44 PM    Comment   Case was discussed with Nicci Hunt and the patient's admission status was agreed to be Admission Status: inpatient status to the service of Dr. Dumont .               Assessment & Plan       Medical Decision Making  Amount and/or Complexity of Data Reviewed  Labs: ordered.    Risk  Prescription drug management.  Decision regarding hospitalization.    \"   Chief Complaint   Patient presents with    Eye Problem     Pt was seen in ER today for right eye swelling, pain, redness. Reports yellow discharge from right eye and headache. States she was instructed to come back if symptoms worsened. States pain has worsened. Was given Toradol while she was seen.        Initial ED Assessment: 29-year-old female with PMH of Crohn's (on Remicade) who presents due to right eye pain, redness and swelling.    Differential dx includes but is not limited to orbital cellulitis, infected hordeolum    With patient's recent workup including orbit CT and basic labs as well as increased symptoms including pain with extraocular eye movements, discharge and fevers, it seems reasonable that the patient has developed orbital cellulitis.  Broad-spectrum antibiotics were started including ceftriaxone, metronidazole and vancomycin.  Pain was treated with IV Tylenol.  With patient severity of her illness, seems reasonable to pursue inpatient management including IV antibiotics.    \"Discussed patient's case with Dr. Dumont (Hospitalist) regarding admission who accepted the patient for further evaluation and management.\"           Medications   vancomycin (VANCOCIN) 1,750 mg in sodium chloride 0.9 " % 500 mL IVPB (1,750 mg Intravenous New Bag 12/27/24 2359)   ceftriaxone (ROCEPHIN) 2 g/50 mL in dextrose IVPB (0 mg Intravenous Stopped 12/27/24 2304)   metroNIDAZOLE (FLAGYL) IVPB (premix) 500 mg 100 mL (0 mg Intravenous Stopped 12/27/24 2358)   acetaminophen (Ofirmev) injection 1,000 mg (0 mg Intravenous Stopped 12/27/24 2304)       ED Risk Strat Scores                          SBIRT 22yo+      Flowsheet Row Most Recent Value   Initial Alcohol Screen: US AUDIT-C     1. How often do you have a drink containing alcohol? 0 Filed at: 12/27/2024 2106   2. How many drinks containing alcohol do you have on a typical day you are drinking?  0 Filed at: 12/27/2024 2106   3a. Male UNDER 65: How often do you have five or more drinks on one occasion? 0 Filed at: 12/27/2024 2106   3b. FEMALE Any Age, or MALE 65+: How often do you have 4 or more drinks on one occassion? 0 Filed at: 12/27/2024 2106   Audit-C Score 0 Filed at: 12/27/2024 2106   ALAINA: How many times in the past year have you...    Used an illegal drug or used a prescription medication for non-medical reasons? Never Filed at: 12/27/2024 2106                            History of Present Illness       Chief Complaint   Patient presents with    Eye Problem     Pt was seen in ER today for right eye swelling, pain, redness. Reports yellow discharge from right eye and headache. States she was instructed to come back if symptoms worsened. States pain has worsened. Was given Toradol while she was seen.        Past Medical History:   Diagnosis Date    Chronic back pain     Colitis     History of transfusion     Multiple abrasions 2013    Proteinuria     Shoulder disorder 11/15/2018    Urinary tract infection     pylonephritis      Past Surgical History:   Procedure Laterality Date    COLONOSCOPY      DILATION AND CURETTAGE OF UTERUS N/A 08/23/2021    Procedure: DILATATION AND CURETTAGE (D&C);  Surgeon: Rona Puga MD;  Location: [facility] ;  Service: Obstetrics     EXAMINATION UNDER ANESTHESIA N/A 2021    Procedure: EXAM UNDER ANESTHESIA (EUA) WITH INSERTION OF BAKRI BALLOON;  Surgeon: Rona Puga MD;  Location: [facility] ;  Service: Obstetrics    TONSILLECTOMY  2009    UPPER GASTROINTESTINAL ENDOSCOPY      WISDOM TOOTH EXTRACTION        Family History   Problem Relation Age of Onset    Hypertension Mother     Arthritis Mother     Depression Mother     COPD Mother     Gestational diabetes Mother     COPD Father     Depression Sister     Anxiety disorder Sister     Cholelithiasis Sister     Depression Brother     Bipolar disorder Brother     Depression Brother     Depression Brother     No Known Problems Maternal Grandmother     COPD Maternal Grandfather     Cancer Maternal Grandfather     Lung cancer Paternal Grandmother     Cancer Paternal Grandmother     Kidney cancer Paternal Grandmother     Heart disease Paternal Grandfather     Skin cancer Maternal Uncle       Social History     Tobacco Use    Smoking status: Former     Current packs/day: 0.00     Types: Cigarettes     Quit date: 2020     Years since quittin.0    Smokeless tobacco: Never    Tobacco comments:     use Vape   Vaping Use    Vaping status: Former   Substance Use Topics    Alcohol use: Not Currently    Drug use: Not Currently     Types: Marijuana     Comment: hasn't used since before pregnancy      E-Cigarette/Vaping    E-Cigarette Use Former User       E-Cigarette/Vaping Substances    Nicotine No     THC No     CBD No     Flavoring No     Other No     Unknown No       I have reviewed and agree with the history as documented.       Eye Problem  Associated symptoms: discharge and redness    Associated symptoms: no vomiting      29-year-old female with PMH of Crohn's (on Remicade) who presents due to right eye pain, redness and swelling.  She states she came to the ED this morning due to her right eye pain and redness and was diagnosed with infected stye.  She states she was told to return to  the ED if she noticed drainage and pain with eye movement, which developed this afternoon.  The patient states that she noticed pain behind and around her eye that is increasing in intensity as well as increased swelling.    Review of Systems   Constitutional:  Positive for chills and fever.   HENT:  Negative for ear pain and sore throat.    Eyes:  Positive for pain, discharge and redness. Negative for visual disturbance.   Respiratory:  Negative for cough and shortness of breath.    Cardiovascular:  Negative for chest pain and palpitations.   Gastrointestinal:  Negative for abdominal pain and vomiting.   Genitourinary:  Negative for dysuria and hematuria.   Musculoskeletal:  Negative for arthralgias and back pain.   Skin:  Negative for color change and rash.   Neurological:  Negative for seizures and syncope.   All other systems reviewed and are negative.          Objective       ED Triage Vitals   Temperature Pulse Blood Pressure Respirations SpO2 Patient Position - Orthostatic VS   12/27/24 2008 12/27/24 2008 12/27/24 2008 12/27/24 2008 12/27/24 2008 12/27/24 2008   97.9 °F (36.6 °C) 80 140/87 18 98 % Sitting      Temp Source Heart Rate Source BP Location FiO2 (%) Pain Score    12/27/24 2008 12/27/24 2008 12/27/24 2008 -- 12/28/24 0100    Oral Monitor Right arm  No Pain      Vitals      Date and Time Temp Pulse SpO2 Resp BP Pain Score FACES Pain Rating User   12/28/24 0100 -- -- -- -- -- No Pain -- SV   12/28/24 0033 97.8 °F (36.6 °C) 82 97 % 17 125/88 -- -- DII   12/27/24 2230 -- 88 97 % 18 132/84 -- -- JK   12/27/24 2223 -- 98 98 % -- -- -- -- JK   12/27/24 2008 97.9 °F (36.6 °C) 80 98 % 18 140/87 -- -- EB            Physical Exam  Vitals and nursing note reviewed.   Constitutional:       General: She is in acute distress.      Appearance: She is well-developed.   HENT:      Head: Normocephalic and atraumatic.   Eyes:      General: No visual field deficit.        Right eye: Discharge and hordeolum present. No  foreign body.      Extraocular Movements:      Right eye: Normal extraocular motion and no nystagmus.      Conjunctiva/sclera:      Right eye: Right conjunctiva is injected. Chemosis present.   Cardiovascular:      Rate and Rhythm: Normal rate and regular rhythm.      Heart sounds: No murmur heard.  Pulmonary:      Effort: Pulmonary effort is normal. No respiratory distress.      Breath sounds: Normal breath sounds.   Abdominal:      Palpations: Abdomen is soft.      Tenderness: There is no abdominal tenderness.   Musculoskeletal:         General: No swelling.      Cervical back: Neck supple.   Skin:     General: Skin is warm and dry.      Capillary Refill: Capillary refill takes less than 2 seconds.   Neurological:      Mental Status: She is alert.   Psychiatric:         Mood and Affect: Mood normal.         Results Reviewed       Procedure Component Value Units Date/Time    POCT pregnancy, urine [[id number]]     Lab Status: No result             No orders to display       Procedures    ED Medication and Procedure Management   Prior to Admission Medications   Prescriptions Last Dose Informant Patient Reported? Taking?   Ferrous Sulfate (IRON PO)  Self Yes No   Sig: Take 1 tablet every day by oral route.   Patient not taking: Reported on 2024   cefdinir (OMNICEF) 300 mg capsule   No No   Sig: Take 1 capsule (300 mg total) by mouth every 12 (twelve) hours for 7 days   cetirizine (ZyrTEC) 10 mg tablet   No No   Sig: Take 1 tablet (10 mg total) by mouth daily   ergocalciferol (ERGOCALCIFEROL) 1.25 MG (78560 UT) capsule   No No   Sig: Take 1 capsule (50,000 Units total) by mouth once a week   Patient not taking: Reported on 2024   fluticasone (FLONASE) 50 mcg/act nasal spray   No No   Si spray into each nostril daily   Patient not taking: Reported on 2024   inFLIXimab (REMICADE) 100 mg   Yes No   Sig: Inject into a catheter in a vein   ketoconazole (NIZORAL) 2 % shampoo  Self Yes No   Sig: as needed    pantoprazole (PROTONIX) 40 mg tablet   No No   Sig: TAKE 1 TABLET DAILY   phentermine 30 MG capsule   No No   Sig: Take 1 capsule (30 mg total) by mouth every morning   sulfamethoxazole-trimethoprim (BACTRIM DS) 800-160 mg per tablet   No No   Sig: Take 1 tablet by mouth 2 (two) times a day for 7 days smx-tmp DS (BACTRIM) 800-160 mg tabs (1tab q12 D10)   topiramate (TOPAMAX) 25 mg tablet   No No   Sig: Take 1 tablet (25 mg total) by mouth 2 (two) times a day      Facility-Administered Medications: None     Current Discharge Medication List        CONTINUE these medications which have NOT CHANGED    Details   cefdinir (OMNICEF) 300 mg capsule Take 1 capsule (300 mg total) by mouth every 12 (twelve) hours for 7 days  Qty: 14 capsule, Refills: 0    Associated Diagnoses: Preseptal cellulitis      cetirizine (ZyrTEC) 10 mg tablet Take 1 tablet (10 mg total) by mouth daily  Qty: 90 tablet, Refills: 1    Associated Diagnoses: Acute rhinosinusitis      ergocalciferol (ERGOCALCIFEROL) 1.25 MG (07640 UT) capsule Take 1 capsule (50,000 Units total) by mouth once a week  Qty: 12 capsule, Refills: 0    Associated Diagnoses: Vitamin D deficiency      Ferrous Sulfate (IRON PO) Take 1 tablet every day by oral route.      fluticasone (FLONASE) 50 mcg/act nasal spray 1 spray into each nostril daily  Qty: 48 mL, Refills: 0    Associated Diagnoses: Acute rhinosinusitis      inFLIXimab (REMICADE) 100 mg Inject into a catheter in a vein      ketoconazole (NIZORAL) 2 % shampoo as needed      pantoprazole (PROTONIX) 40 mg tablet TAKE 1 TABLET DAILY  Qty: 90 tablet, Refills: 1    Associated Diagnoses: PUD (peptic ulcer disease)      phentermine 30 MG capsule Take 1 capsule (30 mg total) by mouth every morning  Qty: 90 capsule, Refills: 0    Associated Diagnoses: Class 3 severe obesity due to excess calories with serious comorbidity and body mass index (BMI) of 40.0 to 44.9 in adult ([facility])      sulfamethoxazole-trimethoprim (BACTRIM DS)  800-160 mg per tablet Take 1 tablet by mouth 2 (two) times a day for 7 days smx-tmp DS (BACTRIM) 800-160 mg tabs (1tab q12 D10)  Qty: 14 tablet, Refills: 0    Associated Diagnoses: Preseptal cellulitis      topiramate (TOPAMAX) 25 mg tablet Take 1 tablet (25 mg total) by mouth 2 (two) times a day  Qty: 60 tablet, Refills: 2    Associated Diagnoses: Class 3 severe obesity due to excess calories with serious comorbidity and body mass index (BMI) of 40.0 to 44.9 in adult (HCC)           No discharge procedures on file.  ED SEPSIS DOCUMENTATION   Time reflects when diagnosis was documented in both MDM as applicable and the Disposition within this note       Time User Action Codes Description Comment    12/27/2024 10:44 PM Jim Floyd Add [H05.011] Orbital cellulitis on right                  Jim Floyd, DO  12/28/24 0308

## 2024-12-28 NOTE — PLAN OF CARE
Problem: PAIN - ADULT  Goal: Verbalizes/displays adequate comfort level or baseline comfort level  Description: Interventions:  - Encourage patient to monitor pain and request assistance  - Assess pain using appropriate pain scale  - Administer analgesics based on type and severity of pain and evaluate response  - Implement non-pharmacological measures as appropriate and evaluate response  - Consider cultural and social influences on pain and pain management  - Notify physician/advanced practitioner if interventions unsuccessful or patient reports new pain  Outcome: Progressing     Problem: INFECTION - ADULT  Goal: Absence or prevention of progression during hospitalization  Description: INTERVENTIONS:  - Assess and monitor for signs and symptoms of infection  - Monitor lab/diagnostic results  - Monitor all insertion sites, i.e. indwelling lines, tubes, and drains  - Monitor endotracheal if appropriate and nasal secretions for changes in amount and color  - [place] appropriate cooling/warming therapies per order  - Administer medications as ordered  - Instruct and encourage patient and family to use good hand hygiene technique  - Identify and instruct in appropriate isolation precautions for identified infection/condition  Outcome: Progressing  Goal: Absence of fever/infection during neutropenic period  Description: INTERVENTIONS:  - Monitor WBC    Outcome: Progressing     Problem: DISCHARGE PLANNING  Goal: Discharge to home or other facility with appropriate resources  Description: INTERVENTIONS:  - Identify barriers to discharge w/patient and caregiver  - Arrange for needed discharge resources and transportation as appropriate  - Identify discharge learning needs (meds, wound care, etc.)  - Arrange for interpretive services to assist at discharge as needed  - Refer to Case Management Department for coordinating discharge planning if the patient needs post-hospital services based on physician/advanced  practitioner order or complex needs related to functional status, cognitive ability, or social support system  Outcome: Progressing     Problem: Knowledge Deficit  Goal: Patient/family/caregiver demonstrates understanding of disease process, treatment plan, medications, and discharge instructions  Description: Complete learning assessment and assess knowledge base.  Interventions:  - Provide teaching at level of understanding  - Provide teaching via preferred learning methods  Outcome: Progressing

## 2024-12-29 VITALS
OXYGEN SATURATION: 97 % | HEART RATE: 96 BPM | DIASTOLIC BLOOD PRESSURE: 82 MMHG | SYSTOLIC BLOOD PRESSURE: 127 MMHG | RESPIRATION RATE: 18 BRPM | TEMPERATURE: 98.2 F

## 2024-12-29 LAB
ANION GAP SERPL CALCULATED.3IONS-SCNC: 3 MMOL/L (ref 4–13)
BASOPHILS # BLD AUTO: 0.05 THOUSANDS/ΜL (ref 0–0.1)
BASOPHILS NFR BLD AUTO: 0 % (ref 0–1)
BUN SERPL-MCNC: 10 MG/DL (ref 5–25)
CALCIUM SERPL-MCNC: 8.8 MG/DL (ref 8.4–10.2)
CHLORIDE SERPL-SCNC: 107 MMOL/L (ref 96–108)
CO2 SERPL-SCNC: 28 MMOL/L (ref 21–32)
CREAT SERPL-MCNC: 0.68 MG/DL (ref 0.6–1.3)
EOSINOPHIL # BLD AUTO: 0.02 THOUSAND/ΜL (ref 0–0.61)
EOSINOPHIL NFR BLD AUTO: 0 % (ref 0–6)
ERYTHROCYTE [DISTWIDTH] IN BLOOD BY AUTOMATED COUNT: 13.5 % (ref 11.6–15.1)
GFR SERPL CREATININE-BSD FRML MDRD: 120 ML/MIN/1.73SQ M
GLUCOSE SERPL-MCNC: 108 MG/DL (ref 65–140)
HCT VFR BLD AUTO: 39.1 % (ref 34.8–46.1)
HGB BLD-MCNC: 12.7 G/DL (ref 11.5–15.4)
IMM GRANULOCYTES # BLD AUTO: 0.08 THOUSAND/UL (ref 0–0.2)
IMM GRANULOCYTES NFR BLD AUTO: 1 % (ref 0–2)
LYMPHOCYTES # BLD AUTO: 2.24 THOUSANDS/ΜL (ref 0.6–4.47)
LYMPHOCYTES NFR BLD AUTO: 16 % (ref 14–44)
MAGNESIUM SERPL-MCNC: 1.9 MG/DL (ref 1.9–2.7)
MCH RBC QN AUTO: 29.1 PG (ref 26.8–34.3)
MCHC RBC AUTO-ENTMCNC: 32.5 G/DL (ref 31.4–37.4)
MCV RBC AUTO: 90 FL (ref 82–98)
MONOCYTES # BLD AUTO: 1.07 THOUSAND/ΜL (ref 0.17–1.22)
MONOCYTES NFR BLD AUTO: 8 % (ref 4–12)
NEUTROPHILS # BLD AUTO: 10.73 THOUSANDS/ΜL (ref 1.85–7.62)
NEUTS SEG NFR BLD AUTO: 75 % (ref 43–75)
NRBC BLD AUTO-RTO: 0 /100 WBCS
PLATELET # BLD AUTO: 224 THOUSANDS/UL (ref 149–390)
PMV BLD AUTO: 11.6 FL (ref 8.9–12.7)
POTASSIUM SERPL-SCNC: 4.2 MMOL/L (ref 3.5–5.3)
RBC # BLD AUTO: 4.37 MILLION/UL (ref 3.81–5.12)
SODIUM SERPL-SCNC: 138 MMOL/L (ref 135–147)
VANCOMYCIN TROUGH SERPL-MCNC: 10.2 UG/ML (ref 10–20)
WBC # BLD AUTO: 14.19 THOUSAND/UL (ref 4.31–10.16)

## 2024-12-29 PROCEDURE — 80202 ASSAY OF VANCOMYCIN: CPT | Performed by: NURSE PRACTITIONER

## 2024-12-29 PROCEDURE — 99239 HOSP IP/OBS DSCHRG MGMT >30: CPT

## 2024-12-29 PROCEDURE — 85025 COMPLETE CBC W/AUTO DIFF WBC: CPT

## 2024-12-29 PROCEDURE — 80048 BASIC METABOLIC PNL TOTAL CA: CPT

## 2024-12-29 PROCEDURE — 83735 ASSAY OF MAGNESIUM: CPT

## 2024-12-29 RX ORDER — PREDNISONE 20 MG/1
40 TABLET ORAL DAILY
Qty: 4 TABLET | Refills: 0 | Status: SHIPPED | OUTPATIENT
Start: 2024-12-30 | End: 2025-01-01

## 2024-12-29 RX ADMIN — LORATADINE 10 MG: 10 TABLET ORAL at 09:06

## 2024-12-29 RX ADMIN — VANCOMYCIN HYDROCHLORIDE 1250 MG: 5 INJECTION, POWDER, LYOPHILIZED, FOR SOLUTION INTRAVENOUS at 09:06

## 2024-12-29 RX ADMIN — KETOROLAC TROMETHAMINE 15 MG: 30 INJECTION, SOLUTION INTRAMUSCULAR; INTRAVENOUS at 09:06

## 2024-12-29 RX ADMIN — PANTOPRAZOLE SODIUM 40 MG: 40 TABLET, DELAYED RELEASE ORAL at 05:50

## 2024-12-29 RX ADMIN — PREDNISONE 40 MG: 20 TABLET ORAL at 09:05

## 2024-12-29 NOTE — ASSESSMENT & PLAN NOTE
Reports she is in flare currently  Maintained on remicade as op, next due 1/6.  D/c on 2 more days of prednisone (received 2 days here)

## 2024-12-29 NOTE — ASSESSMENT & PLAN NOTE
Right eyelid swelling, erythema, discharge began 12/25.  DC from ED 12/27 with cefdinir and bactrim with worsening symptoms  CT right periorbital soft tissue swelling consistent with recent septal cellulitis, episcleritis  Antibiotics: will d/c with bactrim x 7 days

## 2024-12-29 NOTE — DISCHARGE INSTR - AVS FIRST PAGE
You were treated for preseptal cellulitis and episcleritis. You have been referred to ophthalmology to establish care with them as sometimes Crohn's disease can lead to eye complications such as uveitis, and sometimes episcleritis may be related as well.     Please use Refresh Plus - no preservative eye drops twice daily x 7 days.    If you notice worsening vision, worsening pain/discharge, or redness, please return to the ED.

## 2024-12-29 NOTE — PLAN OF CARE
Problem: PAIN - ADULT  Goal: Verbalizes/displays adequate comfort level or baseline comfort level  Description: Interventions:  - Encourage patient to monitor pain and request assistance  - Assess pain using appropriate pain scale  - Administer analgesics based on type and severity of pain and evaluate response  - Implement non-pharmacological measures as appropriate and evaluate response  - Consider cultural and social influences on pain and pain management  - Notify physician/advanced practitioner if interventions unsuccessful or patient reports new pain  Outcome: Progressing     Problem: INFECTION - ADULT  Goal: Absence or prevention of progression during hospitalization  Description: INTERVENTIONS:  - Assess and monitor for signs and symptoms of infection  - Monitor lab/diagnostic results  - Monitor all insertion sites, i.e. indwelling lines, tubes, and drains  - Monitor endotracheal if appropriate and nasal secretions for changes in amount and color  - [place] appropriate cooling/warming therapies per order  - Administer medications as ordered  - Instruct and encourage patient and family to use good hand hygiene technique  - Identify and instruct in appropriate isolation precautions for identified infection/condition  Outcome: Progressing  Goal: Absence of fever/infection during neutropenic period  Description: INTERVENTIONS:  - Monitor WBC    Outcome: Progressing     Problem: DISCHARGE PLANNING  Goal: Discharge to home or other facility with appropriate resources  Description: INTERVENTIONS:  - Identify barriers to discharge w/patient and caregiver  - Arrange for needed discharge resources and transportation as appropriate  - Identify discharge learning needs (meds, wound care, etc.)  - Arrange for interpretive services to assist at discharge as needed  - Refer to Case Management Department for coordinating discharge planning if the patient needs post-hospital services based on physician/advanced  practitioner order or complex needs related to functional status, cognitive ability, or social support system  Outcome: Progressing     Problem: Knowledge Deficit  Goal: Patient/family/caregiver demonstrates understanding of disease process, treatment plan, medications, and discharge instructions  Description: Complete learning assessment and assess knowledge base.  Interventions:  - Provide teaching at level of understanding  - Provide teaching via preferred learning methods  Outcome: Progressing

## 2024-12-29 NOTE — DISCHARGE SUMMARY
Discharge Summary - Hospitalist   Name: Vickie Stock 29 y.o. female I MRN: [id number]  Unit/Bed#: S -01 I Date of Admission: 12/27/2024   Date of Service: 12/29/2024 I Hospital Day: 0     Assessment & Plan  Preseptal cellulitis of right eye + Episcleritis  Right eyelid swelling, erythema, discharge began 12/25.  DC from ED 12/27 with cefdinir and bactrim with worsening symptoms  CT right periorbital soft tissue swelling consistent with recent septal cellulitis, episcleritis  Antibiotics: will d/c with bactrim x 7 days  Crohn's disease of small intestine with complication (HCC)  Reports she is in flare currently  Maintained on remicade as op, next due 1/6.  D/c on 2 more days of prednisone (received 2 days here)  PUD (peptic ulcer disease)  Continue PPI     Medical Problems       Resolved Problems  Date Reviewed: 11/20/2024   None       Discharging Physician / Practitioner: Marci Coello DO  PCP: Ashley Butler MD  Admission Date:   Admission Orders (From admission, onward)       Ordered        12/27/24 2245  Place in Observation  Once                          Discharge Date: 12/29/24    Consultations During Hospital Stay:  none    Procedures Performed:   none    Significant Findings / Test Results:   Preseptal cellulitis + episcleritis      Outpatient Tests Requested:  Ophthalmology eval    Complications:  none    Reason for Admission: Preseptal cellulitis    Hospital Course:   Vickie Stock is a 29 y.o. female patient who originally presented to the hospital on 12/27/2024 due to right eye swelling, pain. Patient underwent CT which showed right periorbital swelling consistent with preseptal cellulitis as well as episcleral enhancement suggestive of episcleritis. She was treated with abx for the cellulitis and eye drops for the episcleritis. Given her crohn's there is concern for eye involvement of her IBD.    Please see above list of diagnoses and related plan for additional information.      Condition at Discharge: good    Discharge Day Visit / Exam:   Subjective:  Patient is feeling well, her eye is significantly improved. Her swelling is decreased.   Vitals: Blood Pressure: 127/82 (12/29/24 0711)  Pulse: 96 (12/29/24 0711)  Temperature: 98.2 °F (36.8 °C) (12/29/24 0711)  Temp Source: Oral (12/27/24 2008)  Respirations: 18 (12/29/24 0711)  SpO2: 97 % (12/29/24 0711)  Physical Exam  Vitals and nursing note reviewed.   Constitutional:       General: She is not in acute distress.     Appearance: She is well-developed.   HENT:      Head: Normocephalic and atraumatic.   Eyes:      Extraocular Movements: Extraocular movements intact.      Conjunctiva/sclera:      Right eye: Right conjunctiva is not injected.      Left eye: Left conjunctiva is not injected.      Comments: Right eyelid is swollen but improved from yesterday   Cardiovascular:      Rate and Rhythm: Normal rate and regular rhythm.      Heart sounds: No murmur heard.  Pulmonary:      Effort: Pulmonary effort is normal. No respiratory distress.      Breath sounds: Normal breath sounds.   Abdominal:      Palpations: Abdomen is soft.      Tenderness: There is no abdominal tenderness.   Musculoskeletal:         General: No swelling.      Cervical back: Neck supple.   Skin:     General: Skin is warm and dry.      Capillary Refill: Capillary refill takes less than 2 seconds.   Neurological:      Mental Status: She is alert.   Psychiatric:         Mood and Affect: Mood normal.          Discussion with Family: Patient declined call to .     Discharge instructions/Information to patient and family:   See after visit summary for information provided to patient and family.      Provisions for Follow-Up Care:  See after visit summary for information related to follow-up care and any pertinent home health orders.      Mobility at time of Discharge:   Basic Mobility Inpatient Raw Score: 24  JH-HLM Goal: 8: Walk 250 feet or more  JH-HLM  Achieved: 8: Walk 250 feet ot more  HLM Goal achieved. Continue to encourage appropriate mobility.     Disposition:   Home    Planned Readmission: none    Discharge Medications:  See after visit summary for reconciled discharge medications provided to patient and/or family.      Administrative Statements   Discharge Statement:  I have spent a total time of 45 minutes in caring for this patient on the day of the visit/encounter.     **Please Note: This note may have been constructed using a voice recognition system**

## 2024-12-30 ENCOUNTER — TELEPHONE (OUTPATIENT)
Dept: FAMILY MEDICINE CLINIC | Facility: CLINIC | Age: 29
End: 2024-12-30

## 2024-12-30 ENCOUNTER — OFFICE VISIT (OUTPATIENT)
Age: 29
End: 2024-12-30
Payer: COMMERCIAL

## 2024-12-30 DIAGNOSIS — E66.01 CLASS 3 SEVERE OBESITY DUE TO EXCESS CALORIES WITH SERIOUS COMORBIDITY AND BODY MASS INDEX (BMI) OF 40.0 TO 44.9 IN ADULT (HCC): ICD-10-CM

## 2024-12-30 DIAGNOSIS — E66.813 CLASS 3 SEVERE OBESITY DUE TO EXCESS CALORIES WITH SERIOUS COMORBIDITY AND BODY MASS INDEX (BMI) OF 40.0 TO 44.9 IN ADULT (HCC): ICD-10-CM

## 2024-12-30 DIAGNOSIS — H44.23 DEGENERATIVE MYOPIA OF BOTH EYES, UNSPECIFIED WHETHER COMPLICATION PRESENT: ICD-10-CM

## 2024-12-30 DIAGNOSIS — H35.413 LATTICE DEGENERATION OF BOTH RETINAS: Primary | ICD-10-CM

## 2024-12-30 DIAGNOSIS — L03.213 PRESEPTAL CELLULITIS: ICD-10-CM

## 2024-12-30 PROCEDURE — 92134 CPTRZ OPH DX IMG PST SGM RTA: CPT | Performed by: OPHTHALMOLOGY

## 2024-12-30 PROCEDURE — 92004 COMPRE OPH EXAM NEW PT 1/>: CPT | Performed by: OPHTHALMOLOGY

## 2024-12-30 NOTE — PROGRESS NOTES
Name: Vickie Stock      : 1995      MRN: [id number]  Encounter Provider: Katelynn Mccallum MD  Encounter Date: 2024   Encounter department: [facility] OPHTHALMOLOGY  :  Assessment & Plan  Lattice degeneration of both retinas  Recommend monitoring at this time; No holes noted and pt is asymptomatic.       Degenerative myopia of both eyes, unspecified whether complication present  Monitor;   Preseptal cellulitis    Orders:    Ambulatory Referral to Ophthalmology  Resolved;           Vickie Stock is a 29 y.o. female who presents today for eye pain and swelling OD. She reports eye pain and swelling right upper lid started . The swelling got worse, She went to ED and was given IV antibiotics for 3 days. She has Chron's Disease. Day 2 of IV got rash on hands and swelling of hand. She didn't get her last full dose due to reaction. She denies blurry vision OU, significant improvement of eye pain and swelling, denies redness OU   History obtained from: patient    Review of Systems  Medical History Reviewed by provider this encounter:  Tobacco  Allergies  Meds  Problems  Med Hx  Surg Hx  Fam Hx     .     Past Ocular History:None  Ocular Meds/Drops: Genteal Ointment PRN OU    Base Eye Exam       Visual Acuity (Snellen - Linear)         Right Left    Dist cc 20/20 20/20              Tonometry (Applanation, 1:33 PM)         Right Left    Pressure 21 20              Pupils         Dark APD    Right 4 -    Left 4 -              Visual Fields         Left Right     Full Full              Extraocular Movement         Right Left     Full, Ortho Full, Ortho              Neuro/Psych       Oriented x3: Yes              Dilation       Both eyes: 2.5% Phenylephrine @ 1:33 PM              Dilation #2       Both eyes: 1.0% Mydriacyl @ 1:33 PM                  Slit Lamp and Fundus Exam       External Exam         Right Left    External Normal Normal              Slit Lamp Exam         Right Left     Lids/Lashes Normal Normal    Conjunctiva/Sclera White and quiet White and quiet    Cornea Clear Clear    Anterior Chamber Deep and quiet Deep and quiet    Iris Round and reactive/ no nvi Round and reactive/ no nvi    Lens Clear Clear    Anterior Vitreous Clear Clear              Fundus Exam         Right Left    Disc sharp, no pallor sharp, no pallor    C/D Ratio 0.35 0.35    Macula Good foveal reflex Good foveal reflex    Vessels normal in caliber normal in caliber    Periphery Lattice intermittently; flat; no holes or tears Lattice at 12:00 and intermittently; No holes or tears                      IMAGING:  OCT, Retina - OU - Both Eyes          Right Eye  Quality was good. Findings include (Vitreomacular adhesion).     Left Eye  Quality was good. Findings include (Vitreomacular adhesion).

## 2025-01-01 NOTE — ED ATTENDING ATTESTATION
I, Radha Kwan MD, saw and evaluated the patient. I have discussed the patient with the resident/non-physician practitioner and agree with the resident's/non-physician practitioner's findings, Plan of Care, and MDM as documented in the resident's/non-physician practitioner's note, except where noted. All available labs and Radiology studies were reviewed.  I was present for key portions of any procedure(s) performed by the resident/non-physician practitioner and I was immediately available to provide assistance.       At this point I agree with the current assessment done in the Emergency Department.  I have conducted an independent evaluation of this patient a history and physical is as follows:    Subjective: 29-year-old female with history of Crohn's on Remicade, preseptal cellulitis recently seen in the emergency department and discharged on antibiotics who returns to care for fevers with worsening pain and vision.     Objective: Vital signs stable.  EOMI and PERRLA with no field cuts.  Periorbital swelling.    Assessment/Plan: 29-year-old immunosuppressed woman presenting with fever and systemic symptoms after being diagnosed with a preseptal cellulitis now concerning for postseptal cellulitis.  Provide with broad-spectrum antibiotics, IV fluids, and admitted to hospitalist.

## 2025-01-06 ENCOUNTER — ANESTHESIA EVENT (OUTPATIENT)
Dept: ANESTHESIOLOGY | Facility: HOSPITAL | Age: 30
End: 2025-01-06

## 2025-01-06 ENCOUNTER — ANESTHESIA (OUTPATIENT)
Dept: ANESTHESIOLOGY | Facility: HOSPITAL | Age: 30
End: 2025-01-06

## 2025-01-06 RX ORDER — PHENTERMINE HYDROCHLORIDE 30 MG/1
30 CAPSULE ORAL EVERY MORNING
Qty: 90 CAPSULE | Refills: 0 | Status: SHIPPED | OUTPATIENT
Start: 2025-01-06

## 2025-01-07 ENCOUNTER — NURSE TRIAGE (OUTPATIENT)
Age: 30
End: 2025-01-07

## 2025-01-07 NOTE — TELEPHONE ENCOUNTER
Pt called in reporting vaginal itching, irritation and increased normal discharge that started last Wednesday. Has been on antibiotics, today is the last day. Has mild abdominal pain but thinks that could be from her chron's. Denies any odor, vaginal bleeding, rash. Reviewed can come for appointment or try OTC Monistat 7. Pt would like appointment. Scheduled for tomorrow and is thankful.

## 2025-01-08 ENCOUNTER — APPOINTMENT (OUTPATIENT)
Dept: LAB | Facility: HOSPITAL | Age: 30
End: 2025-01-08
Attending: OBSTETRICS & GYNECOLOGY
Payer: COMMERCIAL

## 2025-01-08 ENCOUNTER — OFFICE VISIT (OUTPATIENT)
Dept: OBGYN CLINIC | Facility: CLINIC | Age: 30
End: 2025-01-08
Payer: COMMERCIAL

## 2025-01-08 VITALS — SYSTOLIC BLOOD PRESSURE: 134 MMHG | DIASTOLIC BLOOD PRESSURE: 92 MMHG | BODY MASS INDEX: 35.28 KG/M2 | WEIGHT: 218.6 LBS

## 2025-01-08 DIAGNOSIS — Z12.4 SCREENING FOR CERVICAL CANCER: Primary | ICD-10-CM

## 2025-01-08 DIAGNOSIS — N92.6 IRREGULAR MENSES: ICD-10-CM

## 2025-01-08 DIAGNOSIS — Z11.3 SCREENING FOR STD (SEXUALLY TRANSMITTED DISEASE): ICD-10-CM

## 2025-01-08 DIAGNOSIS — N89.8 VAGINAL DISCHARGE: ICD-10-CM

## 2025-01-08 DIAGNOSIS — Z01.411 ENCOUNTER FOR GYNECOLOGICAL EXAMINATION WITH ABNORMAL FINDING: ICD-10-CM

## 2025-01-08 LAB — TSH SERPL DL<=0.05 MIU/L-ACNC: 2.13 UIU/ML (ref 0.45–4.5)

## 2025-01-08 PROCEDURE — 36415 COLL VENOUS BLD VENIPUNCTURE: CPT

## 2025-01-08 PROCEDURE — G0145 SCR C/V CYTO,THINLAYER,RESCR: HCPCS | Performed by: OBSTETRICS & GYNECOLOGY

## 2025-01-08 PROCEDURE — 84443 ASSAY THYROID STIM HORMONE: CPT

## 2025-01-08 PROCEDURE — 86803 HEPATITIS C AB TEST: CPT

## 2025-01-08 PROCEDURE — 87480 CANDIDA DNA DIR PROBE: CPT | Performed by: OBSTETRICS & GYNECOLOGY

## 2025-01-08 PROCEDURE — 87510 GARDNER VAG DNA DIR PROBE: CPT | Performed by: OBSTETRICS & GYNECOLOGY

## 2025-01-08 PROCEDURE — 87660 TRICHOMONAS VAGIN DIR PROBE: CPT | Performed by: OBSTETRICS & GYNECOLOGY

## 2025-01-08 PROCEDURE — 87389 HIV-1 AG W/HIV-1&-2 AB AG IA: CPT

## 2025-01-08 PROCEDURE — 99385 PREV VISIT NEW AGE 18-39: CPT | Performed by: OBSTETRICS & GYNECOLOGY

## 2025-01-08 PROCEDURE — 99213 OFFICE O/P EST LOW 20 MIN: CPT | Performed by: OBSTETRICS & GYNECOLOGY

## 2025-01-08 PROCEDURE — 87591 N.GONORRHOEAE DNA AMP PROB: CPT | Performed by: OBSTETRICS & GYNECOLOGY

## 2025-01-08 PROCEDURE — 87340 HEPATITIS B SURFACE AG IA: CPT

## 2025-01-08 PROCEDURE — 87491 CHLMYD TRACH DNA AMP PROBE: CPT | Performed by: OBSTETRICS & GYNECOLOGY

## 2025-01-08 NOTE — PROGRESS NOTES
Name: Vickie Stock      : 1995      MRN: [id number]  Encounter Provider: Daniela Andino MD  Encounter Date: 2025   Encounter department: [facility] [place]  :  Assessment & Plan  Encounter for gynecological examination with abnormal finding  Pap obtained  Declines gardasil at this time  Desires STD testing  Mammogram recommended at age 42  We reviewed that there is no reason to suspect infertility based on her history of postpartum hemorrhage.       Screening for STD (sexually transmitted disease)    Orders:  •  Chlamydia/GC amplified DNA by PCR  •  Hepatitis B surface antigen; Future  •  Hepatitis C antibody; Future  •  HIV 1/2 AG/AB w Reflex [facility] for 2 yr old and above; Future    Vaginal discharge  Wet mount inconclusive. Affirm obtained. Will treat based on sx  Orders:  •  VAGINOSIS DNA PROBE    Irregular menses  Recommend tracking her menses.  We discussed possibility of birth control to help regulate them.  Orders:  •  TSH, 3rd generation; Future        History of Present Illness   HPI  Vickie Stock is a 29 y.o. female who presents for yeast infection symptoms. States she has vaginal itching, irritation and yellow discharge that started last Wednesday. Has been on antibiotics, 25 is the last day. Has mild abdominal pain but thinks that could be from her chron's. Denies any odor, vaginal bleeding, rash.  Recently diagnosed with Chron's and single mom, has difficulty being seen for appt, so this was turned into an annual    BC: none  LMP:2024; irregular menses, not greater than 2 months  Currently Sexually active with women  Pap NILM   Unsure on gardasil  Desires STD testing  Denies FH breast and ovarian cancer          Review of Systems       Objective   /92 (BP Location: Left arm, Patient Position: Sitting, Cuff Size: Standard)   Wt 99.2 kg (218 lb 9.6 oz)   LMP 2024 (Approximate)   BMI 35.28 kg/m²      Physical  Exam  Vitals and nursing note reviewed.   Constitutional:       General: She is not in acute distress.     Appearance: She is not ill-appearing.   Pulmonary:      Effort: Pulmonary effort is normal. No respiratory distress.   Chest:   Breasts:     Right: Normal. No mass, nipple discharge or skin change.      Left: Normal. No mass, nipple discharge or skin change.   Abdominal:      General: There is no distension.      Palpations: Abdomen is soft.      Tenderness: There is no abdominal tenderness. There is no guarding or rebound.   Genitourinary:     General: Normal vulva.      Exam position: Lithotomy position.      Vagina: Vaginal discharge (scant) present.      Cervix: Normal. No cervical motion tenderness.      Uterus: Normal. Not enlarged and not tender.       Adnexa:         Right: No mass, tenderness or fullness.          Left: No mass, tenderness or fullness.     Lymphadenopathy:      Upper Body:      Right upper body: No axillary adenopathy.      Left upper body: No axillary adenopathy.   Neurological:      Mental Status: She is alert.

## 2025-01-09 ENCOUNTER — RESULTS FOLLOW-UP (OUTPATIENT)
Dept: OBGYN CLINIC | Facility: CLINIC | Age: 30
End: 2025-01-09

## 2025-01-09 LAB
CANDIDA RRNA VAG QL PROBE: NOT DETECTED
G VAGINALIS RRNA GENITAL QL PROBE: NOT DETECTED
HBV SURFACE AG SER QL: NORMAL
HCV AB SER QL: NORMAL
HIV 1+2 AB+HIV1 P24 AG SERPL QL IA: NORMAL
HIV 2 AB SERPL QL IA: NORMAL
HIV1 AB SERPL QL IA: NORMAL
HIV1 P24 AG SERPL QL IA: NORMAL
T VAGINALIS RRNA GENITAL QL PROBE: NOT DETECTED

## 2025-01-10 LAB
C TRACH DNA SPEC QL NAA+PROBE: NEGATIVE
N GONORRHOEA DNA SPEC QL NAA+PROBE: NEGATIVE

## 2025-01-13 LAB
LAB AP GYN PRIMARY INTERPRETATION: NORMAL
Lab: NORMAL

## 2025-01-17 ENCOUNTER — PATIENT MESSAGE (OUTPATIENT)
Dept: FAMILY MEDICINE CLINIC | Facility: CLINIC | Age: 30
End: 2025-01-17

## 2025-01-17 DIAGNOSIS — F41.9 ANXIETY AND DEPRESSION: Primary | ICD-10-CM

## 2025-01-17 DIAGNOSIS — F32.A ANXIETY AND DEPRESSION: Primary | ICD-10-CM

## 2025-01-19 ENCOUNTER — NURSE TRIAGE (OUTPATIENT)
Dept: OTHER | Facility: OTHER | Age: 30
End: 2025-01-19

## 2025-01-19 NOTE — TELEPHONE ENCOUNTER
"Reason for Disposition   Unexplained nausea    Answer Assessment - Initial Assessment Questions  1. NAUSEA SEVERITY: \"How         Last ate/drank approx 6pm    2. ONSET: \"When did the nausea begin?\"        Last night, worse upon waking  Also has a \"migraine\" 7/10  Subjectively hot and cold    3. VOMITING: \"Any vomiting?\" If Yes, ask: \"How many times today?\"        Denies    4. RECURRENT SYMPTOM: \"Have you had nausea before?\" If Yes, ask: \"When was the last time?\" \"What happened that time?\"        No    5. CAUSE: \"What do you think is causing the nausea?\"        Concerned for flu    6. PREGNANCY: \"Is there any chance you are pregnant?\" (e.g., unprotected intercourse, missed birth control pill, broken condom)        Denies    Protocols used: Nausea-Adult-      Patient cancelling 1/20 colonoscopy at this time.  Please contact to reschedule.  "

## 2025-01-21 RX ORDER — ESCITALOPRAM OXALATE 5 MG/1
5 TABLET ORAL DAILY
Qty: 30 TABLET | Refills: 0 | Status: SHIPPED | OUTPATIENT
Start: 2025-01-21

## 2025-01-28 ENCOUNTER — TELEPHONE (OUTPATIENT)
Age: 30
End: 2025-01-28

## 2025-01-28 NOTE — TELEPHONE ENCOUNTER
"Behavioral Health Outpatient Intake Questions    Referred By   : PCP    Please advise interviewee that they need to answer all questions truthfully to allow for best care, and any misrepresentations of information may affect their ability to be seen at this clinic   => Was this discussed? Yes     If Minor Child (under age 18)    Who is/are the legal guardian(s) of the child?     Is there a custody agreement?      If \"YES\"- Custody orders must be obtained prior to scheduling the first appointment  In addition, Consent to Treatment must be signed by all legal guardians prior to scheduling the first appointment    If \"NO\"- Consent to Treatment must be signed by all legal guardians prior to scheduling the first appointment    Behavioral Health Outpatient Intake History -     Presenting Problem (in patient's own words): Anxiety, depression, suspicion of ADHD    Are there any communication barriers for this patient?     No                                               If yes, please describe barriers:   If there is a unique situation, please refer to [facility]/Paulette Henriquez for final determination.    Are you taking any psychiatric medications? No     If \"YES\" -What are they      If \"YES\" -Who prescribes?     Has the Patient previously received outpatient Talk Therapy or Medication Management from [facility]  Yes        If \"YES\"- When, Where and with Whom?     Jaclyn Carreon 7/2021     If \"NO\" -Has Patient received these services elsewhere?       If \"YES\" -When, Where, and with Whom?  [facility] Currently seeing Virtual provider    Has the Patient abused alcohol or other substances in the last 6 months ? No  No concerns of substance abuse are reported.     If \"YES\" -What substance, How much, How often?     If illegal substance: Refer to [facility] (for WANDA) or [facility] Offices.   If Alcohol in excess of 10 drinks per week:  Refer to [facility] (for WANDA) or [facility] Offices    Legal History-     Is this treatment " "court ordered? No   If \"yes \"send to :  Talk Therapy : Send to [facility]anch[facility] Randolph for final determination   Med Management: Send to Dr. Sandoval for final determination     Has the Patient been convicted of a felony?  No   If \"Yes\" send to -When, What?  Talk Therapy: Send to Pancho Randolph for final determination   Med Management: Send to Dr. Sandoval for final determination     ACCEPTED as a patient Yes  If \"Yes\" Appointment Date:   Dr. Miller 3/3 @ [facility]    Referred Elsewhere? No  If “Yes” - (Where? Ex: [facility], [facility], [facility], [facility], etc.)       Name of Insurance Co:[employer]   Insurance ID#[id number]   Insurance Phone # [phone]  If ins is primary or secondary?  If patient is a minor, parents information such as Name, D.O.B of guarantor.  "

## 2025-01-28 NOTE — TELEPHONE ENCOUNTER
Contacted patient off of Medication Management  to verify needs of services in attempts to offer patient an appointment. spoke with patient whom stated they still interested In services

## 2025-02-04 ENCOUNTER — TELEPHONE (OUTPATIENT)
Age: 30
End: 2025-02-04

## 2025-02-04 NOTE — TELEPHONE ENCOUNTER
Scheduled date of colonoscopy (as of today): 4-  Physician performing colonoscopy: Dr. Lechuga  Location of colon[facility]   Bowel prep reviewed with patient: dulcolacarmen miralax   Instructions reviewed with patient by: 11-6-2024  Clearances: n/a

## 2025-02-05 ENCOUNTER — TELEPHONE (OUTPATIENT)
Age: 30
End: 2025-02-05

## 2025-02-05 ENCOUNTER — TELEPHONE (OUTPATIENT)
Dept: GASTROENTEROLOGY | Facility: AMBULARY SURGERY CENTER | Age: 30
End: 2025-02-05

## 2025-02-05 NOTE — TELEPHONE ENCOUNTER
Pt called asking where she can  the accommodation form. Per [employer] [facility] clerical team stated that pt can pick this up the [facility] office and it will be ready for her.

## 2025-02-05 NOTE — TELEPHONE ENCOUNTER
Pt came to [place] office to sign her medical accommodations request forms. Forms were faxed to [employer] and scanned into chart under media.

## 2025-02-24 DIAGNOSIS — F41.9 ANXIETY AND DEPRESSION: ICD-10-CM

## 2025-02-24 DIAGNOSIS — F32.A ANXIETY AND DEPRESSION: ICD-10-CM

## 2025-02-24 RX ORDER — ESCITALOPRAM OXALATE 5 MG/1
5 TABLET ORAL DAILY
Qty: 30 TABLET | Refills: 5 | Status: SHIPPED | OUTPATIENT
Start: 2025-02-24

## 2025-03-07 ENCOUNTER — TELEPHONE (OUTPATIENT)
Dept: GASTROENTEROLOGY | Facility: AMBULARY SURGERY CENTER | Age: 30
End: 2025-03-07

## 2025-03-07 NOTE — TELEPHONE ENCOUNTER
Received a new prescription request from [facility] for Inflectra 5mg/kg round up to nearest 100 mg. See form scanned into media. Thank you.

## 2025-03-10 ENCOUNTER — OFFICE VISIT (OUTPATIENT)
Dept: FAMILY MEDICINE CLINIC | Facility: CLINIC | Age: 30
End: 2025-03-10
Payer: COMMERCIAL

## 2025-03-10 VITALS
WEIGHT: 220 LBS | OXYGEN SATURATION: 98 % | HEIGHT: 66 IN | HEART RATE: 99 BPM | BODY MASS INDEX: 35.36 KG/M2 | DIASTOLIC BLOOD PRESSURE: 84 MMHG | RESPIRATION RATE: 16 BRPM | SYSTOLIC BLOOD PRESSURE: 116 MMHG | TEMPERATURE: 98.1 F

## 2025-03-10 DIAGNOSIS — F32.A ANXIETY AND DEPRESSION: ICD-10-CM

## 2025-03-10 DIAGNOSIS — E66.813 CLASS 3 SEVERE OBESITY DUE TO EXCESS CALORIES WITH SERIOUS COMORBIDITY AND BODY MASS INDEX (BMI) OF 40.0 TO 44.9 IN ADULT (HCC): ICD-10-CM

## 2025-03-10 DIAGNOSIS — F41.9 ANXIETY AND DEPRESSION: ICD-10-CM

## 2025-03-10 DIAGNOSIS — E66.01 SEVERE OBESITY (BMI 35.0-39.9) WITH COMORBIDITY (HCC): Primary | ICD-10-CM

## 2025-03-10 DIAGNOSIS — F33.2 SEVERE EPISODE OF RECURRENT MAJOR DEPRESSIVE DISORDER, WITHOUT PSYCHOTIC FEATURES (HCC): ICD-10-CM

## 2025-03-10 DIAGNOSIS — R11.2 NAUSEA AND VOMITING, UNSPECIFIED VOMITING TYPE: ICD-10-CM

## 2025-03-10 DIAGNOSIS — K50.019 CROHN'S DISEASE OF SMALL INTESTINE WITH UNSPECIFIED COMPLICATIONS (HCC): ICD-10-CM

## 2025-03-10 DIAGNOSIS — E66.01 CLASS 3 SEVERE OBESITY DUE TO EXCESS CALORIES WITH SERIOUS COMORBIDITY AND BODY MASS INDEX (BMI) OF 40.0 TO 44.9 IN ADULT (HCC): ICD-10-CM

## 2025-03-10 PROCEDURE — 99214 OFFICE O/P EST MOD 30 MIN: CPT | Performed by: FAMILY MEDICINE

## 2025-03-10 RX ORDER — TIRZEPATIDE 2.5 MG/.5ML
2.5 INJECTION, SOLUTION SUBCUTANEOUS WEEKLY
Qty: 2 ML | Refills: 0 | Status: SHIPPED | OUTPATIENT
Start: 2025-03-10 | End: 2025-04-07

## 2025-03-10 RX ORDER — ONDANSETRON 4 MG/1
4 TABLET, FILM COATED ORAL EVERY 8 HOURS PRN
Qty: 20 TABLET | Refills: 0 | Status: SHIPPED | OUTPATIENT
Start: 2025-03-10

## 2025-03-10 RX ORDER — TOPIRAMATE 25 MG/1
25 TABLET, FILM COATED ORAL DAILY
Qty: 30 TABLET | Refills: 2 | Status: SHIPPED | OUTPATIENT
Start: 2025-03-10

## 2025-03-10 RX ORDER — ESCITALOPRAM OXALATE 10 MG/1
10 TABLET ORAL DAILY
Qty: 30 TABLET | Refills: 2 | Status: SHIPPED | OUTPATIENT
Start: 2025-03-10

## 2025-03-10 RX ORDER — HYDROXYZINE HYDROCHLORIDE 25 MG/1
25 TABLET, FILM COATED ORAL EVERY 6 HOURS PRN
Qty: 60 TABLET | Refills: 1 | Status: SHIPPED | OUTPATIENT
Start: 2025-03-10

## 2025-03-11 ENCOUNTER — TELEPHONE (OUTPATIENT)
Age: 30
End: 2025-03-11

## 2025-03-11 DIAGNOSIS — E66.01 SEVERE OBESITY (BMI 35.0-39.9) WITH COMORBIDITY (HCC): Primary | ICD-10-CM

## 2025-03-11 DIAGNOSIS — E66.01 CLASS 3 SEVERE OBESITY DUE TO EXCESS CALORIES WITH SERIOUS COMORBIDITY AND BODY MASS INDEX (BMI) OF 40.0 TO 44.9 IN ADULT (HCC): ICD-10-CM

## 2025-03-11 DIAGNOSIS — E66.813 CLASS 3 SEVERE OBESITY DUE TO EXCESS CALORIES WITH SERIOUS COMORBIDITY AND BODY MASS INDEX (BMI) OF 40.0 TO 44.9 IN ADULT (HCC): ICD-10-CM

## 2025-03-11 NOTE — TELEPHONE ENCOUNTER
PA for zepbound 2.5 mg/0.5 ml SUBMITTED to [facility]    via    []CMM-KEY:   [x]Surescripts-Case ID # [id number]   []Availity-Auth ID # NDC #   []Faxed to plan   []Other website   []Phone call Case ID #     []PA sent as URGENT    All office notes, labs and other pertaining documents and studies sent. Clinical questions answered. Awaiting determination from insurance company.     Turnaround time for your insurance to make a decision on your Prior Authorization can take 7-21 business days.

## 2025-03-11 NOTE — PROGRESS NOTES
Name: Vickie Stock      : 1995     MRN: [id number]  Encounter Provider: Ashley Butler MD  Encounter Date: 3/10/2025  Encounter department: [facility]    Assessment & Plan  Severe episode of recurrent major depressive disorder, without psychotic features (HCC)  Increase escitalopram mainly anxiety more than depression.  Briefly counseled         Crohn's disease of small intestine with unspecified complications (HCC)    Orders:  •  Comprehensive metabolic panel; Future    Anxiety and depression  Increase escitalopram to 10 mg once daily, use hydroxyzine as needed for anxiety or insomnia.    Orders:  •  escitalopram (LEXAPRO) 10 mg tablet; Take 1 tablet (10 mg total) by mouth daily  •  hydrOXYzine HCL (ATARAX) 25 mg tablet; Take 1 tablet (25 mg total) by mouth every 6 (six) hours as needed for anxiety (hs prn)    Severe obesity (BMI 35.0-39.9) with comorbidity (HCC)  Recommend zepbound for weight loss  - Discussed options of HealthyCORE-Intensive Lifestyle Intervention Program, Very Low Calorie Diet-VLCD, Conservative Program, Charles-En-Y Gastric Bypass, and Vertical Sleeve Gastrectomy and the role of weight loss medications.  Not interested in surgery  - Explained the importance of making lifestyle changes with anti-obesity medications.She has been doing strict lifestyle modification  - Patient is interested in pursuing Conservative Program    - Weight loss can improve patient's co-morbid conditions and/or prevent weight-related complications.  - not sexually active  - Interested in weight loss medication to help with weight loss.   - FDA approved weight loss medications reviewed: Wegovy, Saxenda, Zepbound (Weekly), Qsymia, Contrave, and Phentermine. Wegovy (weekly)and Saxenda (daily)shortage discussed. Off label use of medications discussed.   - I have discussed side effects of GLP-1 analogue(zepbound/mounjaro/trulicity/ozempic/rebysus)- commonly including nausea,  vomiting,diarrhea, constipation, dyspepsia, tachycardia,abdominal pain,decreased appetite, UTIs,rare but more serious side effects- diabetic retinopathy, acute kidney injury, medullary thyroid cancer, thyroid c cell tumors, anaphylaxis, angioedema, chronic renal failure, pancreatitis, cholecystitis,cholelithiasis.  - Patient denies personal history of pancreatitis. Patient also denies personal and family history of medullary thyroid cancer and multiple endocrine neoplasia type 2 (MEN 2 tumor). - -She made an informed decision to start Zepbound.   - Start Zepbound 2.5 mg subcutaneously weekly. After you have taken the second pen, please give me an update, as we will likely increase the dose the next month if you are tolerating it well.  -  Keep an eye on your heart rate while on Zepbound. If you resting heart rate is greater than 100 beats per minutes, please notify me. If you develop severe abdominal pain, stop Zepbound and go to the emergency room, as that could be a sign of pancreatitis.   - Please notify me if you have surgery, upper endoscopy, or colonoscopy scheduled, as we typically hold Zepbound for one week prior to the procedure.   - Zepbound can reduce the effectiveness of oral hormonal birth control (birth control pills). She was advised to use a barrier backup method such as condoms to prevent pregnancy for 4 weeks after starting the medication and for 4 weeks after each dose adjustment.            Goals:  Do not skip meals.  Food log (ie.) www.Hello Universe.com,sparkpeople.com,loseit.com,calorieking.com,etc. baritastic (use skinnytaste.com, Buscatucancha.com or smartphone sedrick EXO5 for recipes)  No sugary beverages. At least 64oz of water daily.  Start food logging.  Exercise 150 min/week  1200 calories per day. Sample menu given.   Start Zepbound,no contraindication noted.       Orders:  •  tirzepatide (Zepbound) 2.5 mg/0.5 mL auto-injector; Inject 0.5 mL (2.5 mg total) under the skin once a week for  28 days    Nausea and vomiting, unspecified vomiting type    Orders:  •  ondansetron (ZOFRAN) 4 mg tablet; Take 1 tablet (4 mg total) by mouth every 8 (eight) hours as needed for nausea or vomiting                   Read package inserts for all medications before starting a new medications, call me if you have any questions.    Patient was given opportunity to ask questions and all questions were answered.    Subjective:     Vickie Stock is a 29 y.o. female.    Obesity- 224 lbs->213 lbs in 9/2024->213 lbs->220 lbs, gaining on phentermine and topiramate as she has not been taking it consistently.  She does follow adequate diet and exercises.  She also has uncontrolled anxiety and depression and had to leave work last week due to uncontrolled anxiety and panic attack  Goal weight is 145 pounds  Also has been seeing therapist, still unable to get psych appointment, has been taking Lexapro 5 mg inconsistently    Crohns- sees GI, on Remicade          Past Medical History:   Diagnosis Date   • Chronic back pain    • Colitis    • History of transfusion    • Multiple abrasions 2013   • Proteinuria    • Shoulder disorder 11/15/2018   • Urinary tract infection     pylonephritis       Family History   Problem Relation Age of Onset   • Hypertension Mother    • Arthritis Mother    • Depression Mother    • COPD Mother    • Gestational diabetes Mother    • COPD Father    • Depression Sister    • Anxiety disorder Sister    • Cholelithiasis Sister    • Depression Brother    • Bipolar disorder Brother    • Depression Brother    • Depression Brother    • No Known Problems Maternal Grandmother    • COPD Maternal Grandfather    • Cancer Maternal Grandfather    • Lung cancer Paternal Grandmother    • Cancer Paternal Grandmother    • Kidney cancer Paternal Grandmother    • Heart disease Paternal Grandfather    • Skin cancer Maternal Uncle        Past Surgical History:   Procedure Laterality Date   • COLONOSCOPY     • DILATION AND  CURETTAGE OF UTERUS N/A 08/23/2021    Procedure: DILATATION AND CURETTAGE (D&C);  Surgeon: Rona Puga MD;  [facility];  Service: Obstetrics   • EXAMINATION UNDER ANESTHESIA N/A 08/23/2021    Procedure: EXAM UNDER ANESTHESIA (EUA) WITH INSERTION OF BAKRI BALLOON;  Surgeon: Rona Puga MD;  [facility];  Service: Obstetrics   • TONSILLECTOMY  2009   • UPPER GASTROINTESTINAL ENDOSCOPY     • WISDOM TOOTH EXTRACTION          reports that she quit smoking about 4 years ago. Her smoking use included cigarettes. She has never used smokeless tobacco. She reports that she does not currently use alcohol. She reports that she does not currently use drugs after having used the following drugs: Marijuana.      Current Outpatient Medications:   •  cetirizine (ZyrTEC) 10 mg tablet, Take 1 tablet (10 mg total) by mouth daily, Disp: 90 tablet, Rfl: 1  •  escitalopram (LEXAPRO) 10 mg tablet, Take 1 tablet (10 mg total) by mouth daily, Disp: 30 tablet, Rfl: 2  •  hydrOXYzine HCL (ATARAX) 25 mg tablet, Take 1 tablet (25 mg total) by mouth every 6 (six) hours as needed for anxiety (hs prn), Disp: 60 tablet, Rfl: 1  •  inFLIXimab (REMICADE) 100 mg, Inject into a catheter in a vein, Disp: , Rfl:   •  ketoconazole (NIZORAL) 2 % shampoo, as needed, Disp: , Rfl:   •  ondansetron (ZOFRAN) 4 mg tablet, Take 1 tablet (4 mg total) by mouth every 8 (eight) hours as needed for nausea or vomiting, Disp: 20 tablet, Rfl: 0  •  pantoprazole (PROTONIX) 40 mg tablet, TAKE 1 TABLET DAILY, Disp: 90 tablet, Rfl: 1  •  tirzepatide (Zepbound) 2.5 mg/0.5 mL auto-injector, Inject 0.5 mL (2.5 mg total) under the skin once a week for 28 days, Disp: 2 mL, Rfl: 0  •  topiramate (TOPAMAX) 25 mg tablet, Take 1 tablet (25 mg total) by mouth daily, Disp: 30 tablet, Rfl: 2    The following portions of the patient's history were reviewed and updated as appropriate: allergies, current medications, past family history, past medical history,  past social history, past surgical history and problem list.    Review of Systems   Constitutional:  Negative for fatigue and fever.   HENT:  Negative for congestion, facial swelling, mouth sores, rhinorrhea, sore throat and trouble swallowing.    Eyes:  Negative for pain and redness.   Respiratory:  Negative for cough, shortness of breath and wheezing.    Cardiovascular:  Negative for chest pain, palpitations and leg swelling.   Gastrointestinal:  Negative for abdominal pain, blood in stool, constipation, diarrhea and nausea.   Genitourinary:  Negative for dysuria, hematuria and urgency.   Musculoskeletal:  Negative for arthralgias, back pain and myalgias.   Skin:  Negative for rash and wound.   Neurological:  Negative for seizures, syncope and headaches.   Hematological:  Negative for adenopathy.   Psychiatric/Behavioral:  Negative for agitation and behavioral problems.          PHQ-2/9 Depression Screening    Little interest or pleasure in doing things: 0 - not at all  Feeling down, depressed, or hopeless: 0 - not at all  Trouble falling or staying asleep, or sleeping too much: 0 - not at all  Feeling tired or having little energy: 0 - not at all  Poor appetite or overeatin - not at all  Feeling bad about yourself - or that you are a failure or have let yourself or your family down: 0 - not at all  Trouble concentrating on things, such as reading the newspaper or watching television: 0 - not at all  Moving or speaking so slowly that other people could have noticed. Or the opposite - being so fidgety or restless that you have been moving around a lot more than usual: 0 - not at all  Thoughts that you would be better off dead, or of hurting yourself in some way: 0 - not at all  PHQ-9 Score: 0  PHQ-9 Interpretation: No or Minimal depression             Objective:    /84 (BP Location: Right arm, Patient Position: Sitting, Cuff Size: Adult)   Pulse 99   Temp 98.1 °F (36.7 °C) (Tympanic)   Resp 16   Ht 5'  "6\" (1.676 m)   Wt 99.8 kg (220 lb)   SpO2 98%   BMI 35.51 kg/m²      Physical Exam  Vitals and nursing note reviewed.   Constitutional:       Appearance: Normal appearance. She is well-developed. She is obese. She is not ill-appearing.   HENT:      Head: Normocephalic and atraumatic.      Right Ear: External ear normal.      Left Ear: External ear normal.      Nose: Nose normal.      Mouth/Throat:      Mouth: Mucous membranes are moist.      Pharynx: No oropharyngeal exudate or posterior oropharyngeal erythema.   Eyes:      General: No scleral icterus.        Right eye: No discharge.         Left eye: No discharge.      Conjunctiva/sclera: Conjunctivae normal.   Cardiovascular:      Rate and Rhythm: Normal rate.      Heart sounds: No murmur heard.     No gallop.   Pulmonary:      Effort: Pulmonary effort is normal. No respiratory distress.      Breath sounds: Normal breath sounds. No stridor. No wheezing, rhonchi or rales.   Abdominal:      Palpations: Abdomen is soft.      Tenderness: There is no abdominal tenderness.   Musculoskeletal:         General: No tenderness or deformity.   Skin:     Findings: No erythema or rash.   Neurological:      Mental Status: She is alert. Mental status is at baseline.   Psychiatric:         Behavior: Behavior normal.         Judgment: Judgment normal.         Recent Results (from the past 52 weeks)   POCT rapid PCR strepA    Collection Time: 04/10/24  5:46 PM   Result Value Ref Range    RAPID PCR STREP A Not Detected Not Detected   CBC and differential    Collection Time: 12/27/24 10:15 AM   Result Value Ref Range    WBC 9.68 4.31 - 10.16 Thousand/uL    RBC 4.70 3.81 - 5.12 Million/uL    Hemoglobin 13.5 11.5 - 15.4 g/dL    Hematocrit 42.0 34.8 - 46.1 %    MCV 89 82 - 98 fL    MCH 28.7 26.8 - 34.3 pg    MCHC 32.1 31.4 - 37.4 g/dL    RDW 13.8 11.6 - 15.1 %    MPV 11.4 8.9 - 12.7 fL    Platelets 209 149 - 390 Thousands/uL    nRBC 0 /100 WBCs    Segmented % 58 43 - 75 %    Immature " Grans % 0 0 - 2 %    Lymphocytes % 32 14 - 44 %    Monocytes % 8 4 - 12 %    Eosinophils Relative 1 0 - 6 %    Basophils Relative 1 0 - 1 %    Absolute Neutrophils 5.63 1.85 - 7.62 Thousands/µL    Absolute Immature Grans 0.02 0.00 - 0.20 Thousand/uL    Absolute Lymphocytes 3.11 0.60 - 4.47 Thousands/µL    Absolute Monocytes 0.76 0.17 - 1.22 Thousand/µL    Eosinophils Absolute 0.07 0.00 - 0.61 Thousand/µL    Basophils Absolute 0.09 0.00 - 0.10 Thousands/µL   Basic metabolic panel    Collection Time: 12/27/24 10:15 AM   Result Value Ref Range    Sodium 138 135 - 147 mmol/L    Potassium 3.8 3.5 - 5.3 mmol/L    Chloride 105 96 - 108 mmol/L    CO2 28 21 - 32 mmol/L    ANION GAP 5 4 - 13 mmol/L    BUN 11 5 - 25 mg/dL    Creatinine 0.82 0.60 - 1.30 mg/dL    Glucose 89 65 - 140 mg/dL    Calcium 9.1 8.4 - 10.2 mg/dL    eGFR 98 ml/min/1.73sq m   Basic metabolic panel    Collection Time: 12/28/24  5:08 AM   Result Value Ref Range    Sodium 139 135 - 147 mmol/L    Potassium 3.7 3.5 - 5.3 mmol/L    Chloride 108 96 - 108 mmol/L    CO2 23 21 - 32 mmol/L    ANION GAP 8 4 - 13 mmol/L    BUN 13 5 - 25 mg/dL    Creatinine 0.76 0.60 - 1.30 mg/dL    Glucose 112 65 - 140 mg/dL    Calcium 8.5 8.4 - 10.2 mg/dL    eGFR 107 ml/min/1.73sq m   CBC and differential    Collection Time: 12/28/24  5:08 AM   Result Value Ref Range    WBC 8.92 4.31 - 10.16 Thousand/uL    RBC 4.61 3.81 - 5.12 Million/uL    Hemoglobin 13.1 11.5 - 15.4 g/dL    Hematocrit 41.0 34.8 - 46.1 %    MCV 89 82 - 98 fL    MCH 28.4 26.8 - 34.3 pg    MCHC 32.0 31.4 - 37.4 g/dL    RDW 13.9 11.6 - 15.1 %    MPV 11.6 8.9 - 12.7 fL    Platelets 224 149 - 390 Thousands/uL    nRBC 0 /100 WBCs    Segmented % 50 43 - 75 %    Immature Grans % 0 0 - 2 %    Lymphocytes % 41 14 - 44 %    Monocytes % 7 4 - 12 %    Eosinophils Relative 1 0 - 6 %    Basophils Relative 1 0 - 1 %    Absolute Neutrophils 4.44 1.85 - 7.62 Thousands/µL    Absolute Immature Grans 0.03 0.00 - 0.20 Thousand/uL     Absolute Lymphocytes 3.67 0.60 - 4.47 Thousands/µL    Absolute Monocytes 0.60 0.17 - 1.22 Thousand/µL    Eosinophils Absolute 0.11 0.00 - 0.61 Thousand/µL    Basophils Absolute 0.07 0.00 - 0.10 Thousands/µL   Vancomycin, trough Draw vanco level PERIPHERALLY at scheduled time/AM labs. Please contact Pharmacy with results and any questions.    Collection Time: 12/29/24  4:35 AM   Result Value Ref Range    Vancomycin Tr 10.2 10.0 - 20.0 ug/mL   Basic metabolic panel    Collection Time: 12/29/24  4:35 AM   Result Value Ref Range    Sodium 138 135 - 147 mmol/L    Potassium 4.2 3.5 - 5.3 mmol/L    Chloride 107 96 - 108 mmol/L    CO2 28 21 - 32 mmol/L    ANION GAP 3 (L) 4 - 13 mmol/L    BUN 10 5 - 25 mg/dL    Creatinine 0.68 0.60 - 1.30 mg/dL    Glucose 108 65 - 140 mg/dL    Calcium 8.8 8.4 - 10.2 mg/dL    eGFR 120 ml/min/1.73sq m   CBC and differential    Collection Time: 12/29/24  4:35 AM   Result Value Ref Range    WBC 14.19 (H) 4.31 - 10.16 Thousand/uL    RBC 4.37 3.81 - 5.12 Million/uL    Hemoglobin 12.7 11.5 - 15.4 g/dL    Hematocrit 39.1 34.8 - 46.1 %    MCV 90 82 - 98 fL    MCH 29.1 26.8 - 34.3 pg    MCHC 32.5 31.4 - 37.4 g/dL    RDW 13.5 11.6 - 15.1 %    MPV 11.6 8.9 - 12.7 fL    Platelets 224 149 - 390 Thousands/uL    nRBC 0 /100 WBCs    Segmented % 75 43 - 75 %    Immature Grans % 1 0 - 2 %    Lymphocytes % 16 14 - 44 %    Monocytes % 8 4 - 12 %    Eosinophils Relative 0 0 - 6 %    Basophils Relative 0 0 - 1 %    Absolute Neutrophils 10.73 (H) 1.85 - 7.62 Thousands/µL    Absolute Immature Grans 0.08 0.00 - 0.20 Thousand/uL    Absolute Lymphocytes 2.24 0.60 - 4.47 Thousands/µL    Absolute Monocytes 1.07 0.17 - 1.22 Thousand/µL    Eosinophils Absolute 0.02 0.00 - 0.61 Thousand/µL    Basophils Absolute 0.05 0.00 - 0.10 Thousands/µL   Magnesium    Collection Time: 12/29/24  4:35 AM   Result Value Ref Range    Magnesium 1.9 1.9 - 2.7 mg/dL   Chlamydia/GC amplified DNA by PCR    Collection Time: 01/08/25  3:52 PM     Specimen: Cervix; Thin-Prep Vial   Result Value Ref Range    N gonorrhoeae, DNA Probe Negative Negative    Chlamydia trachomatis, DNA Probe Negative Negative   Liquid-based pap, screening    Collection Time: 01/08/25  3:52 PM   Result Value Ref Range    Case Report       Gynecologic Cytology Report                       Case: [id number]                                  Authorizing Provider:  Daniela Andino MD  Collected:           01/08/2025 155              Ordering Location:     [facility]      Received:            01/08/2025 [facility]                                     [facility]                                                                               [place]                                                                    First Screen:          Nicky Astorga                                                            Rescreen:              MING García                                                    Specimen:    LIQUID-BASED PAP, SCREENING, Cervix                                                        Primary Interpretation Negative for intraepithelial lesion or malignancy     Specimen Adequacy       Satisfactory for evaluation. Endocervical/transformation zone component present.    Additional Information       [employer]'s FDA approved ,  and ThinPrep Imaging Duo System are utilized with strict adherence to the 's instruction manual to prepare gynecologic and non-gynecologic cytology specimens for the production of ThinPrep slides as well as for gynecologic ThinPrep imaging. These processes have been validated by our laboratory and/or by the .  The Pap test is not a diagnostic procedure and should not be used as the sole means to detect cervical cancer. It is only a screening procedure to aid in the detection of cervical cancer and its precursors. Both false-negative and false-positive results have been experienced. Your  patient's test result should be interpreted in this context together with the history and clinical findings.      Gross Description       20 ml , colorless, cloudy received in a ThinPrep vial.     Vaginosis DNA Probe    Collection Time: 01/08/25  3:52 PM    Specimen: Vaginal; Genital   Result Value Ref Range    Trichomonas vaginalis Not Detected Not Detected    Gardnerella vaginalis Not Detected Not Detected    Candida Species Not Detected Not Detected   HIV 1/2 AG/AB w Reflex [facility] for 2 yr old and above    Collection Time: 01/08/25  5:03 PM   Result Value Ref Range    HIV-1 p24 Antigen Non-Reactive Non-Reactive    HIV-1 Antibody Non-Reactive Non-Reactive    HIV-2 Antibody Non-Reactive Non-Reactive    HIV Ag-Ab 5th Gen Non-Reactive Non-Reactive   Hepatitis C antibody    Collection Time: 01/08/25  5:03 PM   Result Value Ref Range    Hepatitis C Ab Non-reactive Non-Reactive   Hepatitis B surface antigen    Collection Time: 01/08/25  5:03 PM   Result Value Ref Range    Hepatitis B Surface Ag Non-reactive Non-Reactive   TSH, 3rd generation    Collection Time: 01/08/25  5:03 PM   Result Value Ref Range    TSH 3RD GENERATON 2.134 0.450 - 4.500 uIU/mL       Laboratory Results: I have personally reviewed the pertinent laboratory results/reports     Radiology/Other Diagnostic Testing Results: I have reviewed the following imaging and agree with the interpretation below.    CT Orbits w contrast  Result Date: 12/27/2024  CT ORBITS WITH CONTRAST. INDICATION:   Right eye concern for orbital cellulitis. COMPARISON: None TECHNIQUE: Axial CT imaging through the orbits was performed.  In addition, sagittal and coronal reformatted images were submitted for interpretation. Radiation dose length product (DLP) for this visit:  196.94 mGy-cm .  This examination, like all CT scans performed in the [facility], was performed utilizing techniques to minimize radiation dose exposure, including the use of iterative   "reconstruction and automated exposure control. IV Contrast:  85 mL of iohexol (OMNIPAQUE) IMAGE QUALITY: Diagnostic. FINDINGS: ORBITS: There is right periorbital soft tissue swelling. There is mild asymmetric episcleral enhancement. There is no retrobulbar extension of disease. The extraocular muscles are within normal limits. SINUSES: Clear paranasal sinuses. SOFT TISSUES: Please see above. BONY STRUCTURES: Normal bony structures.     Right-sided periorbital soft tissue swelling consistent with recent septal cellulitis. Mild adjacent episcleral enhancement is suggestive of episcleritis. Otherwise no juan postseptal extension of disease identified. No subperiosteal collections are seen. Workstation performed: [id number]        Disclaimer: Portions of the record may have been created with voice recognition software. Occasional wrong word or \"sound a like\" substitutions may have occurred due to the inherent limitations of voice recognition software. Read the chart carefully and recognize, using context, where substitutions have occurred. I have used the Epic copy/forward function to compose this note. I have reviewed my current note to ensure it reflects the current patient status, exam, assessment and plan.  "

## 2025-03-11 NOTE — TELEPHONE ENCOUNTER
She needs to see comprehensive weight management in order to get Zepbound.  Please place the referral and let her know

## 2025-03-11 NOTE — TELEPHONE ENCOUNTER
PA for zepbound 2.5 mg/0.5 ml  DENIED    Reason:(Screenshot if applicable)        Message sent to office clinical pool Yes    Denial letter scanned into Media Yes    Appeal started No (Provider will need to decide if appeal is warranted and send clinical documentation to Prior Authorization Team for initiation.)    **Please follow up with your patient regarding denial and next steps**

## 2025-03-11 NOTE — ASSESSMENT & PLAN NOTE
Increase escitalopram to 10 mg once daily, use hydroxyzine as needed for anxiety or insomnia.    Orders:    escitalopram (LEXAPRO) 10 mg tablet; Take 1 tablet (10 mg total) by mouth daily    hydrOXYzine HCL (ATARAX) 25 mg tablet; Take 1 tablet (25 mg total) by mouth every 6 (six) hours as needed for anxiety (hs prn)

## 2025-03-19 ENCOUNTER — TELEMEDICINE (OUTPATIENT)
Dept: PSYCHIATRY | Facility: CLINIC | Age: 30
End: 2025-03-19
Payer: COMMERCIAL

## 2025-03-19 DIAGNOSIS — F41.1 GENERALIZED ANXIETY DISORDER: ICD-10-CM

## 2025-03-19 DIAGNOSIS — F31.4 BIPOLAR 1 DISORDER, DEPRESSED, SEVERE (HCC): Primary | ICD-10-CM

## 2025-03-19 PROBLEM — F31.32 BIPOLAR I DISORDER, MOST RECENT EPISODE DEPRESSED, MODERATE (HCC): Status: ACTIVE | Noted: 2025-03-19

## 2025-03-19 PROCEDURE — 90792 PSYCH DIAG EVAL W/MED SRVCS: CPT | Performed by: STUDENT IN AN ORGANIZED HEALTH CARE EDUCATION/TRAINING PROGRAM

## 2025-03-19 RX ORDER — LURASIDONE HYDROCHLORIDE 20 MG/1
20 TABLET, FILM COATED ORAL
Qty: 30 TABLET | Refills: 1 | Status: SHIPPED | OUTPATIENT
Start: 2025-03-19

## 2025-03-19 NOTE — ASSESSMENT & PLAN NOTE
The patient presents with a history of symptoms consistent with bipolar 1 disorder, most recent episode depressed, severe.  She has had pronounced and severe depressed episodes with chronic passive suicidal thoughts without any plan or intent to harm herself.  She has also experienced frequent episodes with decreased need for sleep with excessively elevated mood and energy, increased goal-directed activity, irritability, and impulsivity, lasting up to several weeks at a time, though most commonly approximately 1 week.  She has had limited trials of medications and reports that she has been inconsistent with taking her most recently prescribed Lexapro.  She has struggled with weight and has been actively trying to lose weight.  Discussed with patient options for managing bipolar depression, with preference for Latuda and Vraylar as options due to increased risk of metabolic side effects with Seroquel and Zyprexa.    The patient does have chronic passive SI, she adamantly denies having ever had plan or intent to harm herself.  She denies any current active suicidal thoughts, plans, or intent and denies any thoughts to harm others or any auditory or visual Nations.  She denies access to firearms.  She is able to appropriately plan for safety during session today.  The patient notes her daughter as a protective factor.  At this time, she does not demonstrate an imminent danger to her safety or to the safety of others.    Start Latuda 20 mg daily with dinner.  Patient advised to take this with food.  Patient advised of risks, benefits, and alternatives.  Patient is agreeable to this plan.    Can continue Lexapro, though recommended continuing at 5 mg daily to minimize risk of manic switch while she is being titrated and stabilized on Latuda.  The patient reports that she has not yet started her 10 mg dose and advised to continue at 5 mg daily.    Can continue Atarax 25 mg Q6 PRN for anxiety.    The patient will continue  to see her individual psychotherapist.

## 2025-03-19 NOTE — ASSESSMENT & PLAN NOTE
Patient presents with symptoms consistent with generalized anxiety disorder.  Symptoms are currently poorly controlled and she continues to have severe anxiety that has interfered with her functioning and caused her distress.    See plan for bipolar 1 disorder.

## 2025-03-19 NOTE — BH TREATMENT PLAN
TREATMENT PLAN (Medication Management Only)        [facility]    Name and Date of Birth:  Vickie Stock 29 y.o. 1995  MRN: [id number]  Date of Treatment Plan: March 19, 2025  Diagnosis/Diagnoses:    1. Bipolar I disorder, most recent episode depressed, moderate (HCC)    2. Generalized anxiety disorder      Strengths/Personal Resources for Self-Care: supportive family, taking medications as prescribed, ability to adapt to life changes, ability to communicate well, ability to listen, ability to reason, ability to understand psychiatric illness, motivation for treatment, being resoureceful, self-reliance, willingness to work on problems.  Area/Areas of need (in own words): anxiety symptoms, depressive symptoms, mood instability  1. Long Term Goal:   improve control of mood.  Target Date:6 months - 9/19/2025  Person/Persons responsible for completion of goal: Vickie  2.  Short Term Objective (s) - How will we reach this goal?:   A.  Provider new recommended medication/dosage changes and/or continue medication(s):  Will attempt trial of Latuda for management of bipolar depression and can continue Lexapro .  B.  Continue individual therapy .  C.  Will work on lifestyle modification to manage stressors .  Target Date:6 months - 9/19/2025  Person/Persons Responsible for Completion of Goal: Vickie  Progress Towards Goals: starting treatment  Treatment Modality: medication management every 4 weeks  Review due 180 days from date of this plan: 6 months - 9/19/2025  Expected length of service: ongoing treatment unless revised  My Physician/PA/NP and I have developed this plan together and I agree to work on the goals and objectives. I understand the treatment goals that were developed for my treatment.   Electronic Signatures: on file (unless signed below)    Anurag Miller MD 03/19/25

## 2025-03-19 NOTE — BH CRISIS PLAN
Client Name: Vickie Stock       Client YOB: 1995    [facility] Safety Plan      Creation Date: 3/19/25 Update Date: 3/19/25   Created By: Anurag Miller MD Last Updated By: Anurag Miller MD      Step 1: Warning Signs:   Warning Signs   Having thoughts of hurting self   Deep sadness            Step 2: Internal Coping Strategies:   Internal Coping Strategies   Reading   Coloring            Step 3: People and social settings that provide distraction:   Name Contact Information   Gemma (mother) Phone   Phillip (father) Phone            Step 4: People whom I can ask for help during a crisis:      Name Contact Information    Gemma (mother) Phone    Phillip (father) Phone      Step 5: Professionals or agencies I can contact during a crisis:      Clinican/Agency Name Phone Emergency Contact    [facility] [phone]       [facility] Emergency [facility] Emergency Department Phone Emergency Department Address    [facility] [phone]         Crisis Phone Numbers:   Suicide Prevention Lifeline: Call or Text  728 Crisis Text Line: Text HOME to 400-402   Please note: Some [facility] do not have a separate number for Child/Adolescent specific crisis. If your county is not listed under Child/Adolescent, please call the adult number for your county      Adult Crisis Numbers: Child/Adolescent Crisis Numbers   [facility]: [phone] [facility]: [phone]   [facility]: [phone] [facility]: [phone]   [facility]: [phone] [place]: [phone]   [facility]: [phone] [place]: [phone]   [place]: [phone]   [facility]: [phone]   [facility]: [phone]   [facility]: [phone] (daytime) [phone] (after hours, weekends, holidays)      Step 6: Making the environment safer (plan for lethal means safety):   Patient did not identify any lethal  methods: Yes     Optional: What is most important to me and worth living for?   Daughter     [place] Safety Plan. Yesica Chau and Jimmie Carrillo. Used with permission of the authors.
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
## 2025-03-31 ENCOUNTER — ANESTHESIA EVENT (OUTPATIENT)
Dept: ANESTHESIOLOGY | Facility: HOSPITAL | Age: 30
End: 2025-03-31

## 2025-03-31 ENCOUNTER — ANESTHESIA (OUTPATIENT)
Dept: ANESTHESIOLOGY | Facility: HOSPITAL | Age: 30
End: 2025-03-31

## 2025-04-02 ENCOUNTER — OFFICE VISIT (OUTPATIENT)
Dept: BARIATRICS | Facility: CLINIC | Age: 30
End: 2025-04-02
Payer: COMMERCIAL

## 2025-04-02 ENCOUNTER — TELEPHONE (OUTPATIENT)
Dept: BARIATRICS | Facility: CLINIC | Age: 30
End: 2025-04-02

## 2025-04-02 VITALS
WEIGHT: 215 LBS | BODY MASS INDEX: 34.55 KG/M2 | DIASTOLIC BLOOD PRESSURE: 90 MMHG | HEIGHT: 66 IN | SYSTOLIC BLOOD PRESSURE: 120 MMHG | HEART RATE: 107 BPM | OXYGEN SATURATION: 98 %

## 2025-04-02 DIAGNOSIS — E66.812 OBESITY, CLASS II, BMI 35-39.9: Primary | ICD-10-CM

## 2025-04-02 PROCEDURE — 99203 OFFICE O/P NEW LOW 30 MIN: CPT

## 2025-04-02 RX ORDER — TIRZEPATIDE 2.5 MG/.5ML
2.5 INJECTION, SOLUTION SUBCUTANEOUS WEEKLY
Qty: 2 ML | Refills: 0 | Status: SHIPPED | OUTPATIENT
Start: 2025-04-02 | End: 2025-04-30

## 2025-04-02 NOTE — PROGRESS NOTES
Assessment/Plan:    Obesity, Class II, BMI 35-39.9  - Discussed options of HealthyCORE-Intensive Lifestyle Intervention Program, Very Low Calorie Diet-VLCD, and Conservative Program and the role of weight loss medications.  - Patient is interested in pursuing Conservative Program and follow up visits with medical weight management provider.  - Explained the importance of making lifestyle changes in addition to starting any anti-obesity medications.   - Initial weight loss goal of 5-10% weight loss for improved health. Weight loss can improve patient's co-morbid conditions and/or prevent weight-related complications.  - Weight is not at goal and patient has been unable to achieve a meaningful weight loss above 5% using various programs and tools for more than 6 months  - Labs reviewed from 1/2025    General Recommendations:  Nutrition:  Eat breakfast daily.  Do not skip meals.      Food log (ie.) www.Goalbook.com, sparkpeople.com, loseit.com, [email or web address][email or web address], etc.     Practice mindful eating.  Be sure to set aside time to eat, eat slowly, and savor your food.     Hydration:    At least 64oz of water daily.  No sugar sweetened beverages.  No juice (eat the fruit instead).     Exercise:  Studies have shown that the ideal exercise goal is somewhere between 150 to 300 minutes of moderate intensity exercise a week.  Start with exercising 10 minutes every other day and gradually increase physical activity with a goal of at least 150 minutes of moderate intensity exercise a week, divided over at least 3 days a week.  An example of this would be exercising 30 minutes a day, 5 days a week.  Resistance training can increase muscle mass and increase our resting metabolic rate.   FULL BODY resistance training is recommended 2-3 times a week.  Do not do this on consecutive days to allow for muscle recovery.     Aim for a bare minimum 5000 steps, even on days you do not exercise.     Monitoring:   Weigh yourself daily.  If  this causes undue stress, then just weigh yourself once a week.  Weigh yourself the same time of the day with the same amount of clothing on.  Preferably this should be done after waking up, before you eat, and with no clothing or minimal clothing on.     Specific Goals:  Calorie goal:  1300 hitesh/day (Provided with meal plan to follow). Discussed her nutrition and lifestyle and we discussed that she may need to increase her protein and work on getting in more fruits and veggies. Discussed some sources of proteins and veggies that maybe she can try. She has a food aversion to vegetables and fruits.   She is Encouraged to start tracking food and eat more consistent meals.   Exercise 1-2 days per week to start for 10-15 minutes per day and increase from there.  Discussed strength training as well.  Discussed medications: She tried phentermine and could not tolerate. She is on topamax. Would not add wellbutrin as she is in the process of getting her Bipolar depression under control with her current medication regimen, she can discuss that with her therapist.   She had her zepbound ordered through her PCP and was awaiting approval.   Zepbound Instructions reviewed with the patient.:  - Begin Zepbound 2.5 mg subcutaneously once a week. Dose changes may occur after 4 doses if medication is tolerated. You will be assessed prior to each dose change to make sure you are tolerating the medication well.  - Please message me when you have 2 pens left from the prescription so there are no lapses in treatment.  - If you have been off the medication for more than 14 days please contact the office as you will need to restart the titration at the starting dose again to avoid significant side effects or adverse events.  - Visit Zepbound.com for further information/injection instructions.   -Please eat small frequent meals to help reduce nausea. Lemon water and saltine crackers may help with this.   - Side effects of Zepbound discussed:  nausea, vomiting, diarrhea, and constipation. If you experience fever, nausea/vomiting, and pain radiating to your back this may be a sign of pancreatitis. Please go to the emergency room if this occurs.  - If on oral birth control a 2nd method of birth control is recommended during the 1st 8 weeks of therapy and for 4 weeks after any dosage change.   - Patient understands the side effects of the medication and proper administration. Patient agrees with the treatment plan and all questions were answered.          Vickie was seen today for consult.    Diagnoses and all orders for this visit:    Obesity, Class II, BMI 35-39.9  -     Ambulatory Referral to Weight Management  -     tirzepatide (Zepbound) 2.5 mg/0.5 mL auto-injector; Inject 0.5 mL (2.5 mg total) under the skin once a week for 28 days           Total time spent reviewing chart, interviewing patient, examining patient, discussing plan, answering all questions, and documentin min, with >50% face-to-face time spent counseling patient on nonsurgical interventions for the treatment of excess weight. Discussed in detail nonsurgical options including intensive lifestyle intervention program, very low-calorie diet program and conservative program.  Discussed the role of weight loss medications.  Counseled patient on diet behavior and exercise modification for weight loss.    Follow up in approximately 3 months with Non-Surgical Physician/Advanced Practitioner.    Subjective:   Chief Complaint   Patient presents with    Consult     Neck 15  Waist 41  SB        Patient ID: Vickie Stock  is a 30 y.o. female with excess weight/obesity here to pursue weight management.  Previous notes and records have been reviewed.    Past Medical History:   Diagnosis Date    Chronic back pain     Colitis     History of transfusion     Multiple abrasions     Proteinuria     Shoulder disorder 11/15/2018    Urinary tract infection     pylonephritis     Past Surgical  History:   Procedure Laterality Date    COLONOSCOPY      DILATION AND CURETTAGE OF UTERUS N/A 08/23/2021    Procedure: DILATATION AND CURETTAGE (D&C);  Surgeon: Rona Puga MD;  [facility];  Service: Obstetrics    EXAMINATION UNDER ANESTHESIA N/A 08/23/2021    Procedure: EXAM UNDER ANESTHESIA (EUA) WITH INSERTION OF BAKRI BALLOON;  Surgeon: Rona Puga MD;  [facility];  Service: Obstetrics    TONSILLECTOMY  2009    UPPER GASTROINTESTINAL ENDOSCOPY      WISDOM TOOTH EXTRACTION         HPI:  Wt Readings from Last 20 Encounters:   04/02/25 97.5 kg (215 lb)   03/10/25 99.8 kg (220 lb)   01/08/25 99.2 kg (218 lb 9.6 oz)   11/20/24 96.6 kg (213 lb)   11/06/24 94.6 kg (208 lb 9.6 oz)   08/21/24 96.6 kg (213 lb)   07/16/24 102 kg (224 lb)   06/10/24 102 kg (225 lb 9.6 oz)   04/10/24 110 kg (243 lb 9.6 oz)   03/13/24 115 kg (254 lb 6.4 oz)   03/06/24 114 kg (251 lb 3.2 oz)   02/16/24 113 kg (248 lb 10.9 oz)   12/27/23 109 kg (240 lb 9.6 oz)   10/17/23 110 kg (242 lb)   09/06/23 110 kg (242 lb 8.1 oz)   08/10/23 110 kg (242 lb 12.8 oz)   08/10/22 109 kg (241 lb)   08/07/22 109 kg (240 lb 8.4 oz)   07/09/22 109 kg (241 lb)   09/29/21 97.1 kg (214 lb)       Patient presents today to medical weight management office for consult. Started zepbound by her PCP but was denied because she did not do a weight loss program for 6 months.   Recently diagnosed with bipolar and dealing with depression and getting her medications right. She wants to get healthier and her insurance needs her to follow with weight management to get approved.  Has hx of eating disorder where she will binge eating and if she gets to busy forgets to eat sometimes for an entire day or two.     Starting MWM weight: 215 lbs   Starting BMI: 35  Starting Waist Measurement: 41 inches  Goal weight: wants to be healthier     Obesity/Excess Weight:  Severity: Severe  Onset:  since last year or so     Modifiers: Diet and Exercise, Commercial  Weight Loss Programs-ie. Weight Watchers, Marie Dorian, Nutrisystem, etc., and Prescription Weight Loss Medications  Contributing factors: Poor Food Choices, Insufficient Physical Activity, Stress/Emotional Eating, Binge Eating, Lack of knowledge of appropriate lifestyle changes, and Insufficient time to make appropriate lifestyle changes  Associated symptoms: comorbid conditions, fatigue, increased joint pain, decreased exercise capacity, body image issues, decreased self esteem, decreased mobility, depression, inability to do certain activities, and clothes do not fit    Diet recall:  B: cereal  S:   L: chicken and rice   S:   D: Skips or eats whatever she makes her 3 year old   S:   Take out frequency: rare     Hydration: Water , energy drinks throughout the day - no sugar monsters or ghost  Alcohol: no  Smoking: no  Exercise: Hiking once or twice a week   Sleep: 4-5 hours a night   STOP ban/8        The following portions of the patient's history were reviewed and updated as appropriate: allergies, current medications, past family history, past medical history, past social history, past surgical history, and problem list.    Family History   Problem Relation Age of Onset    Hypertension Mother     Arthritis Mother     Depression Mother     COPD Mother     Gestational diabetes Mother     COPD Father     Depression Sister     Anxiety disorder Sister     Cholelithiasis Sister     Depression Brother     Bipolar disorder Brother     Depression Brother     Depression Brother     No Known Problems Maternal Grandmother     COPD Maternal Grandfather     Cancer Maternal Grandfather     Lung cancer Paternal Grandmother     Cancer Paternal Grandmother     Kidney cancer Paternal Grandmother     Heart disease Paternal Grandfather     Skin cancer Maternal Uncle         Review of Systems   Constitutional:  Negative for fatigue.   HENT:  Negative for sore throat.    Respiratory:  Negative for cough and shortness of breath.   "  Cardiovascular:  Negative for chest pain, palpitations and leg swelling.   Gastrointestinal:  Negative for abdominal pain, constipation, diarrhea, nausea and vomiting.   Genitourinary:  Negative for dysuria.   Musculoskeletal:  Negative for arthralgias and back pain.   Skin:  Negative for rash.   Neurological:  Negative for headaches.   Psychiatric/Behavioral:  Negative for dysphoric mood. The patient is not nervous/anxious.        Objective:  /90 (BP Location: Left arm, Patient Position: Sitting, Cuff Size: Large)   Pulse (!) 107   Ht 5' 5.5\" (1.664 m)   Wt 97.5 kg (215 lb)   LMP 03/05/2025 (Approximate)   SpO2 98%   BMI 35.23 kg/m²     Physical Exam  Vitals and nursing note reviewed.   Constitutional:       Appearance: Normal appearance. She is obese.   HENT:      Head: Normocephalic.   Pulmonary:      Effort: Pulmonary effort is normal.   Neurological:      Mental Status: She is oriented to person, place, and time.   Psychiatric:         Mood and Affect: Mood normal.         Behavior: Behavior normal.         Thought Content: Thought content normal.         Judgment: Judgment normal.              Labs and Imaging  Recent labs and imaging have been personally reviewed.  Lab Results   Component Value Date    WBC 14.19 (H) 12/29/2024    HGB 12.7 12/29/2024    HCT 39.1 12/29/2024    MCV 90 12/29/2024     12/29/2024     Lab Results   Component Value Date    SODIUM 138 12/29/2024    K 4.2 12/29/2024     12/29/2024    CO2 28 12/29/2024    AGAP 3 (L) 12/29/2024    BUN 10 12/29/2024    CREATININE 0.68 12/29/2024    GLUC 108 12/29/2024    GLUF 84 08/13/2022    CALCIUM 8.8 12/29/2024    AST 10 (L) 01/08/2024    ALT 15 01/08/2024    ALKPHOS 106 (H) 01/08/2024    TP 7.3 01/08/2024    TBILI 0.26 01/08/2024    EGFR 120 12/29/2024     No results found for: \"HGBA1C\"  Lab Results   Component Value Date    JQB1XYXBFVNV 2.134 01/08/2025     No results found for: \"CHOLESTEROL\"  No results found for: " "\"HDL\"  No results found for: \"TRIG\"  No results found for: \"LDLCALC\"  "

## 2025-04-02 NOTE — TELEPHONE ENCOUNTER
Patient called back and relayed message that PA was denied. She will reach out to insurance and will call us back with information

## 2025-04-02 NOTE — TELEPHONE ENCOUNTER
Lvm....Pt's PCP sent PA for zepbound, came back denied stating pt must participate in a 6 month weight loss program.  I called pt to ask her to please call her ins to find out what exactly they need during these '6 months'.

## 2025-04-02 NOTE — ASSESSMENT & PLAN NOTE
- Discussed options of HealthyCORE-Intensive Lifestyle Intervention Program, Very Low Calorie Diet-VLCD, and Conservative Program and the role of weight loss medications.  - Patient is interested in pursuing Conservative Program and follow up visits with medical weight management provider.  - Explained the importance of making lifestyle changes in addition to starting any anti-obesity medications.   - Initial weight loss goal of 5-10% weight loss for improved health. Weight loss can improve patient's co-morbid conditions and/or prevent weight-related complications.  - Weight is not at goal and patient has been unable to achieve a meaningful weight loss above 5% using various programs and tools for more than 6 months  - Labs reviewed from 1/2025    General Recommendations:  Nutrition:  Eat breakfast daily.  Do not skip meals.      Food log (ie.) www.myfitnesspal.com, sparkpeople.com, loseit.com, calorieking.com, etc.     Practice mindful eating.  Be sure to set aside time to eat, eat slowly, and savor your food.     Hydration:    At least 64oz of water daily.  No sugar sweetened beverages.  No juice (eat the fruit instead).     Exercise:  Studies have shown that the ideal exercise goal is somewhere between 150 to 300 minutes of moderate intensity exercise a week.  Start with exercising 10 minutes every other day and gradually increase physical activity with a goal of at least 150 minutes of moderate intensity exercise a week, divided over at least 3 days a week.  An example of this would be exercising 30 minutes a day, 5 days a week.  Resistance training can increase muscle mass and increase our resting metabolic rate.   FULL BODY resistance training is recommended 2-3 times a week.  Do not do this on consecutive days to allow for muscle recovery.     Aim for a bare minimum 5000 steps, even on days you do not exercise.     Monitoring:   Weigh yourself daily.  If this causes undue stress, then just weigh yourself once  a week.  Weigh yourself the same time of the day with the same amount of clothing on.  Preferably this should be done after waking up, before you eat, and with no clothing or minimal clothing on.     Specific Goals:  Calorie goal:  1300 hitesh/day (Provided with meal plan to follow). Discussed her nutrition and lifestyle and we discussed that she may need to increase her protein and work on getting in more fruits and veggies. Discussed some sources of proteins and veggies that maybe she can try. She has a food aversion to vegetables and fruits.   She is Encouraged to start tracking food and eat more consistent meals.   Exercise 1-2 days per week to start for 10-15 minutes per day and increase from there.  Discussed strength training as well.  Discussed medications: She tried phentermine and could not tolerate. She is on topamax. Would not add wellbutrin as she is in the process of getting her Bipolar depression under control with her current medication regimen, she can discuss that with her therapist.   She had her zepbound ordered through her PCP and was awaiting approval.   Zepbound Instructions reviewed with the patient.:  - Begin Zepbound 2.5 mg subcutaneously once a week. Dose changes may occur after 4 doses if medication is tolerated. You will be assessed prior to each dose change to make sure you are tolerating the medication well.  - Please message me when you have 2 pens left from the prescription so there are no lapses in treatment.  - If you have been off the medication for more than 14 days please contact the office as you will need to restart the titration at the starting dose again to avoid significant side effects or adverse events.  - Visit Zepbound.com for further information/injection instructions.   -Please eat small frequent meals to help reduce nausea. Lemon water and saltine crackers may help with this.   - Side effects of Zepbound discussed: nausea, vomiting, diarrhea, and constipation. If you  experience fever, nausea/vomiting, and pain radiating to your back this may be a sign of pancreatitis. Please go to the emergency room if this occurs.  - If on oral birth control a 2nd method of birth control is recommended during the 1st 8 weeks of therapy and for 4 weeks after any dosage change.   - Patient understands the side effects of the medication and proper administration. Patient agrees with the treatment plan and all questions were answered.

## 2025-04-16 ENCOUNTER — TELEMEDICINE (OUTPATIENT)
Dept: PSYCHIATRY | Facility: CLINIC | Age: 30
End: 2025-04-16
Payer: COMMERCIAL

## 2025-04-16 DIAGNOSIS — F41.1 GENERALIZED ANXIETY DISORDER: ICD-10-CM

## 2025-04-16 DIAGNOSIS — F31.4 BIPOLAR 1 DISORDER, DEPRESSED, SEVERE (HCC): Primary | ICD-10-CM

## 2025-04-16 PROCEDURE — 99214 OFFICE O/P EST MOD 30 MIN: CPT | Performed by: STUDENT IN AN ORGANIZED HEALTH CARE EDUCATION/TRAINING PROGRAM

## 2025-04-16 RX ORDER — LURASIDONE HYDROCHLORIDE 40 MG/1
40 TABLET, FILM COATED ORAL
Qty: 30 TABLET | Refills: 1 | Status: SHIPPED | OUTPATIENT
Start: 2025-04-16

## 2025-04-16 NOTE — ASSESSMENT & PLAN NOTE
Symptoms are improving. She continues to have symptoms of depression and passive SI but these are improving after starting Latuda. She has had good mood stability. While she has passive SI intermittently, she adamantly denies active SI, HI, or AVH. She contracts for safety. She does not demonstrate an imminent danger to her safety or the safety of others.    Increase Latuda to 40 mg daily.  Continue Lexapro 10 mg daily.    Can continue Atarax 25 mg Q6 PRN for acute anxiety.    Continue individual psychotherapy.

## 2025-04-16 NOTE — ASSESSMENT & PLAN NOTE
Symptoms appear improved. Patient requires infrequent Atarax use.    See plan for Bipolar Disorder.
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
## 2025-05-08 ENCOUNTER — TELEPHONE (OUTPATIENT)
Age: 30
End: 2025-05-08

## 2025-05-08 ENCOUNTER — TELEMEDICINE (OUTPATIENT)
Dept: PSYCHIATRY | Facility: CLINIC | Age: 30
End: 2025-05-08
Payer: COMMERCIAL

## 2025-05-08 DIAGNOSIS — F41.1 GENERALIZED ANXIETY DISORDER: ICD-10-CM

## 2025-05-08 DIAGNOSIS — F31.4 BIPOLAR 1 DISORDER, DEPRESSED, SEVERE (HCC): Primary | ICD-10-CM

## 2025-05-08 DIAGNOSIS — F41.9 ANXIETY AND DEPRESSION: ICD-10-CM

## 2025-05-08 DIAGNOSIS — F32.A ANXIETY AND DEPRESSION: ICD-10-CM

## 2025-05-08 PROCEDURE — 99214 OFFICE O/P EST MOD 30 MIN: CPT | Performed by: STUDENT IN AN ORGANIZED HEALTH CARE EDUCATION/TRAINING PROGRAM

## 2025-05-08 RX ORDER — HYDROXYZINE HYDROCHLORIDE 50 MG/1
50 TABLET, FILM COATED ORAL EVERY 6 HOURS PRN
Qty: 30 TABLET | Refills: 1 | Status: SHIPPED | OUTPATIENT
Start: 2025-05-08

## 2025-05-08 RX ORDER — TRAZODONE HYDROCHLORIDE 50 MG/1
50 TABLET ORAL
Qty: 30 TABLET | Refills: 1 | Status: SHIPPED | OUTPATIENT
Start: 2025-05-08

## 2025-05-08 RX ORDER — ESCITALOPRAM OXALATE 10 MG/1
15 TABLET ORAL DAILY
Qty: 45 TABLET | Refills: 1 | Status: SHIPPED | OUTPATIENT
Start: 2025-05-08

## 2025-05-08 NOTE — ASSESSMENT & PLAN NOTE
The patient has had worsening of anxiety symptoms recently due to psychosocial stressors as above.  She has been struggling more with her sleep and has been exhausted.  She had initially found hydroxyzine to be quite helpful with her sleep, though lately has not been finding this to be as beneficial.  We will increase hydroxyzine and utilize trazodone as a backup as needed medication to assist with her sleep.    Increase hydroxyzine to 50 mg every 6 as needed for acute anxiety.  Start trazodone 50 mg nightly as needed for insomnia.  We will increase Lexapro to 15 mg daily as above.    Continue individual psychotherapy.
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
## 2025-05-08 NOTE — TELEPHONE ENCOUNTER
Patient called and reported that she wanted to talk with provider about her anxiety mication. Writer offered to move her appt up, which pt. Agreed. Patient scheduled for 5/8 at 10:30am virtually.

## 2025-05-08 NOTE — ASSESSMENT & PLAN NOTE
The patient has had relative stability of manic symptoms and mood stability, though does have some ongoing symptoms of depression.  Depression has recently been affected by ongoing stress and anxiety related to psychosocial stressors, particularly her daughter having increased behavioral issues that have coincided with changes in routine after the patient had transitioned work.  She has found some benefit from Latuda, though does have intermittent passive death wishes.  She denies having had any plan or intent to harm herself when she does have these thoughts and states that these thoughts have been less frequent recently.  She remains able to verbally plan for safety and denies any current SI, HI, or AVH.  She is logical and future oriented with her daughter has a good protective factor.  She does not demonstrate an imminent danger to her safety or to the safety of others.    Continue Latuda 40 mg daily.  We will increase Lexapro to 15 mg daily due to increased anxiety and lingering depressive symptoms.    Continue individual psychotherapy.

## 2025-05-20 ENCOUNTER — TELEMEDICINE (OUTPATIENT)
Dept: PSYCHIATRY | Facility: CLINIC | Age: 30
End: 2025-05-20
Payer: COMMERCIAL

## 2025-05-20 DIAGNOSIS — F41.1 GENERALIZED ANXIETY DISORDER: ICD-10-CM

## 2025-05-20 DIAGNOSIS — F31.4 BIPOLAR 1 DISORDER, DEPRESSED, SEVERE (HCC): Primary | ICD-10-CM

## 2025-05-20 PROCEDURE — 99214 OFFICE O/P EST MOD 30 MIN: CPT | Performed by: STUDENT IN AN ORGANIZED HEALTH CARE EDUCATION/TRAINING PROGRAM

## 2025-05-20 RX ORDER — LORAZEPAM 0.5 MG/1
0.5 TABLET ORAL
Qty: 30 TABLET | Refills: 1 | Status: SHIPPED | OUTPATIENT
Start: 2025-05-20

## 2025-05-20 RX ORDER — LURASIDONE HYDROCHLORIDE 60 MG/1
40 TABLET, FILM COATED ORAL
Qty: 30 TABLET | Refills: 1 | Status: SHIPPED | OUTPATIENT
Start: 2025-05-20

## 2025-05-20 NOTE — ASSESSMENT & PLAN NOTE
Patient has had some increased mood symptoms lately and anxiety. Depression has recently been affected by ongoing stress and anxiety related to psychosocial stressors, particularly her daughter having increased behavioral issues that have coincided with changes in routine after the patient had transitioned work.  She has found some benefit from Latuda, though does have intermittent passive death wishes.  She denies having had any plan or intent to harm herself when she does have these thoughts and states that these thoughts have been less frequent recently.  She remains able to verbally plan for safety and denies any current SI, HI, or AVH.  She is logical and future oriented with her daughter has a good protective factor.  She does not demonstrate an imminent danger to her safety or to the safety of others.  Depressive symptoms and anxiety improved with increase in Lexapro but she did experience a recent hypomanic episode lasting approximately 4 days.  Will keep Lexapro at current dose and titrate Latuda further.     She has had no significant improvement in sleep during period of increased stress, with feeling tired the next day on Trazodone. She continues to have substantial anxiety at night interfering with sleep. She has had issues with weight gain in past and has concerns about taking Remeron. Will utilize Ativan at low dose for as needed use for sleep during this transition period. Patient advised to minimize use to reduce risk of dependency and habit formation and advised to work on sleep hygiene practices to help with sleep.    Increase Latuda to 60 mg daily with dinner. Encouraged taking closer to dinner to improve absorption.  Continue Lexapro 15 mg daily.    Discontinue Trazodone. Can continue Hydroxyzine 50 mg QHS PRN for insomnia.  Start Ativan 0.5 mg QHS PRN for insomnia and severe anxiety.    Continue individual psychotherapy.
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
## 2025-05-20 NOTE — ASSESSMENT & PLAN NOTE
The patient has had worsening of anxiety symptoms recently due to psychosocial stressors as above.  She has been struggling more with her sleep and has been exhausted.  She had initially found hydroxyzine to be quite helpful with her sleep, though lately has not been finding this to be as beneficial.  Further titration has not been helpful and Trazodone makes her too tired the next day. As above, she is concerned about risk of appetite stimulation and weight gain with Remeron. Will utilize Ativan as above.    See plan for Bipolar disorder.

## 2025-05-21 DIAGNOSIS — K27.9 PUD (PEPTIC ULCER DISEASE): ICD-10-CM

## 2025-05-22 RX ORDER — PANTOPRAZOLE SODIUM 40 MG/1
40 TABLET, DELAYED RELEASE ORAL DAILY
Qty: 90 TABLET | Refills: 1 | Status: SHIPPED | OUTPATIENT
Start: 2025-05-22

## 2025-05-29 RX ORDER — ESCITALOPRAM OXALATE 10 MG/1
TABLET ORAL
Qty: 45 TABLET | Refills: 1 | OUTPATIENT
Start: 2025-05-29

## 2025-06-12 ENCOUNTER — OFFICE VISIT (OUTPATIENT)
Dept: FAMILY MEDICINE CLINIC | Facility: CLINIC | Age: 30
End: 2025-06-12
Payer: COMMERCIAL

## 2025-06-12 VITALS
TEMPERATURE: 98 F | BODY MASS INDEX: 34.72 KG/M2 | WEIGHT: 216 LBS | SYSTOLIC BLOOD PRESSURE: 120 MMHG | RESPIRATION RATE: 16 BRPM | DIASTOLIC BLOOD PRESSURE: 78 MMHG | OXYGEN SATURATION: 99 % | HEART RATE: 91 BPM | HEIGHT: 66 IN

## 2025-06-12 DIAGNOSIS — F32.A ANXIETY AND DEPRESSION: ICD-10-CM

## 2025-06-12 DIAGNOSIS — E66.01 SEVERE OBESITY (BMI 35.0-39.9) WITH COMORBIDITY (HCC): ICD-10-CM

## 2025-06-12 DIAGNOSIS — Z00.01 ENCOUNTER FOR GENERAL ADULT MEDICAL EXAMINATION WITH ABNORMAL FINDINGS: Primary | ICD-10-CM

## 2025-06-12 DIAGNOSIS — Z13.29 THYROID DISORDER SCREENING: ICD-10-CM

## 2025-06-12 DIAGNOSIS — F41.9 ANXIETY AND DEPRESSION: ICD-10-CM

## 2025-06-12 DIAGNOSIS — Z13.6 SCREENING FOR CARDIOVASCULAR CONDITION: ICD-10-CM

## 2025-06-12 DIAGNOSIS — K50.019 CROHN'S DISEASE OF SMALL INTESTINE WITH UNSPECIFIED COMPLICATIONS (HCC): ICD-10-CM

## 2025-06-12 DIAGNOSIS — F31.9 BIPOLAR 1 DISORDER (HCC): ICD-10-CM

## 2025-06-12 DIAGNOSIS — Z13.1 SCREENING FOR DIABETES MELLITUS: ICD-10-CM

## 2025-06-12 DIAGNOSIS — F31.4 BIPOLAR 1 DISORDER, DEPRESSED, SEVERE (HCC): ICD-10-CM

## 2025-06-12 PROCEDURE — 99395 PREV VISIT EST AGE 18-39: CPT | Performed by: FAMILY MEDICINE

## 2025-06-12 RX ORDER — LURASIDONE HYDROCHLORIDE 60 MG/1
60 TABLET, FILM COATED ORAL
Qty: 30 TABLET | Refills: 1 | Status: SHIPPED | OUTPATIENT
Start: 2025-06-12 | End: 2025-06-13 | Stop reason: DRUGHIGH

## 2025-06-12 RX ORDER — PHENTERMINE HYDROCHLORIDE 30 MG/1
CAPSULE ORAL
COMMUNITY
Start: 2025-04-18 | End: 2025-06-12

## 2025-06-12 NOTE — PATIENT INSTRUCTIONS
"Patient Education     Routine physical for adults   The Basics   Written by the doctors and editors at [facility]   What is a physical? -- A physical is a routine visit, or \"check-up,\" with your doctor. You might also hear it called a \"wellness visit\" or \"preventive visit.\"  During each visit, the doctor will:   Ask about your physical and mental health   Ask about your habits, behaviors, and lifestyle   Do an exam   Give you vaccines if needed   Talk to you about any medicines you take   Give advice about your health   Answer your questions  Getting regular check-ups is an important part of taking care of your health. It can help your doctor find and treat any problems you have. But it's also important for preventing health problems.  A routine physical is different from a \"sick visit.\" A sick visit is when you see a doctor because of a health concern or problem. Since physicals are scheduled ahead of time, you can think about what you want to ask the doctor.  How often should I get a physical? -- It depends on your age and health. In general, for people age 21 years and older:   If you are younger than 50 years, you might be able to get a physical every 3 years.   If you are 50 years or older, your doctor might recommend a physical every year.  If you have an ongoing health condition, like diabetes or high blood pressure, your doctor will probably want to see you more often.  What happens during a physical? -- In general, each visit will include:   Physical exam - The doctor or nurse will check your height, weight, heart rate, and blood pressure. They will also look at your eyes and ears. They will ask about how you are feeling and whether you have any symptoms that bother you.   Medicines - It's a good idea to bring a list of all the medicines you take to each doctor visit. Your doctor will talk to you about your medicines and answer any questions. Tell them if you are having any side effects that bother you. You " "should also tell them if you are having trouble paying for any of your medicines.   Habits and behaviors - This includes:   Your diet   Your exercise habits   Whether you smoke, drink alcohol, or use drugs   Whether you are sexually active   Whether you feel safe at home  Your doctor will talk to you about things you can do to improve your health and lower your risk of health problems. They will also offer help and support. For example, if you want to quit smoking, they can give you advice and might prescribe medicines. If you want to improve your diet or get more physical activity, they can help you with this, too.   Lab tests, if needed - The tests you get will depend on your age and situation. For example, your doctor might want to check your:   Cholesterol   Blood sugar   Iron level   Vaccines - The recommended vaccines will depend on your age, health, and what vaccines you already had. Vaccines are very important because they can prevent certain serious or deadly infections.   Discussion of screening - \"Screening\" means checking for diseases or other health problems before they cause symptoms. Your doctor can recommend screening based on your age, risk, and preferences. This might include tests to check for:   Cancer, such as breast, prostate, cervical, ovarian, colorectal, prostate, lung, or skin cancer   Sexually transmitted infections, such as chlamydia and gonorrhea   Mental health conditions like depression and anxiety  Your doctor will talk to you about the different types of screening tests. They can help you decide which screenings to have. They can also explain what the results might mean.   Answering questions - The physical is a good time to ask the doctor or nurse questions about your health. If needed, they can refer you to other doctors or specialists, too.  Adults older than 65 years often need other care, too. As you get older, your doctor will talk to you about:   How to prevent falling at " home   Hearing or vision tests   Memory testing   How to take your medicines safely   Making sure that you have the help and support you need at home  All topics are updated as new evidence becomes available and our peer review process is complete.  This topic retrieved from [employer] on: May 02, 2024.  Topic 832788 Version 1.0  Release: 32.4.3 - C32.122  © 2024 UpToDate, Inc. and/or its affiliates. All rights reserved.  Consumer Information Use and Disclaimer   Disclaimer: This generalized information is a limited summary of diagnosis, treatment, and/or medication information. It is not meant to be comprehensive and should be used as a tool to help the user understand and/or assess potential diagnostic and treatment options. It does NOT include all information about conditions, treatments, medications, side effects, or risks that may apply to a specific patient. It is not intended to be medical advice or a substitute for the medical advice, diagnosis, or treatment of a health care provider based on the health care provider's examination and assessment of a patient's specific and unique circumstances. Patients must speak with a health care provider for complete information about their health, medical questions, and treatment options, including any risks or benefits regarding use of medications. This information does not endorse any treatments or medications as safe, effective, or approved for treating a specific patient. UpToDate, Inc. and its affiliates disclaim any warranty or liability relating to this information or the use thereof.The use of this information is governed by the Terms of Use, available at https://www.[employer].com/en/know/clinical-effectiveness-terms. 2024© UpToDate, Inc. and its affiliates and/or licensors. All rights reserved.  Copyright   © 2024 UpToDate, Inc. and/or its affiliates. All rights reserved.

## 2025-06-12 NOTE — PROGRESS NOTES
Adult Annual Physical  Name: Vickie Stock      : 1995      MRN: [id number]  Encounter Provider: Ashley Butler MD  Encounter Date: 2025   Encounter department: [facility] MEDICINE    :  Assessment & Plan  Encounter for general adult medical examination with abnormal findings    Orders:    CBC and differential; Future    Comprehensive metabolic panel; Future    Hemoglobin A1C; Future    Lipid panel; Future    TSH, 3rd generation with Free T4 reflex; Future    Bipolar 1 disorder, depressed, severe (HCC)    Orders:    Lurasidone HCl 60 MG TABS; Take 1 tablet (60 mg total) by mouth daily with dinner    Severe obesity (BMI 35.0-39.9) with comorbidity (HCC)     Patient has accepted the advise and agreed to 1800 hitesh diet, exercise 150 min/week , behavior modification.I have provided patient information and instructions for weight loss. I have arranged for appropriate follow up. Will benefit from GLP-1 analog like Zepbound.  I have advised her to obtain lipid panel and A1c as well and follow-up thereafter.           Screening for diabetes mellitus    Orders:    Hemoglobin A1C; Future    Screening for cardiovascular condition    Orders:    Lipid panel; Future    Thyroid disorder screening    Orders:    TSH, 3rd generation with Free T4 reflex; Future    Crohn's disease of small intestine with unspecified complications (HCC)         Anxiety and depression           Bipolar 1 disorder (HCC)  Recommend continuing Latuda and primary management per psychiatrist.                      Preventive Screenings:    - Cervical cancer screening: screening up-to-date     Immunizations:  - Immunizations due: Prevnar 20 and Zoster (Shingrix)         History of Present Illness     Adult Annual Physical:  Patient presents for annual physical. With bipolar 1 disorder, morbid obesity, anxiety, depression, Crohn's disease, peptic ulcer disease, hypermobility of joints presents for follow-up.  She has been  started on Latuda and has been feeling much better.  She continues to be on trazodone and Lexapro as well.  She has lost 4 pounds since the last visit.  Starting weight was 220 pounds and has lost 4 pounds with 1500-calorie diet and 150 minutes of exercise per week.  She is interested in medical weight loss.  She has previously been on phentermine topiramate however had to stop it due to mental health issues..     Diet and Physical Activity:  - Diet/Nutrition: well balanced diet and consuming 3-5 servings of fruits/vegetables daily.  - Exercise: moderate cardiovascular exercise.    Depression Screening:    - PHQ-9 Score: 0    General Health:  - Sleep: sleeps well.  - Hearing: normal hearing bilateral ears.  - Vision: goes for regular eye exams.  - Dental: regular dental visits.    /GYN Health:  - Follows with GYN: yes.   - Menopause: premenopausal.     Review of Systems   Constitutional:  Negative for fatigue and fever.   HENT:  Negative for congestion, facial swelling, mouth sores, rhinorrhea, sore throat and trouble swallowing.    Eyes:  Negative for pain and redness.   Respiratory:  Negative for cough, shortness of breath and wheezing.    Cardiovascular:  Negative for chest pain, palpitations and leg swelling.   Gastrointestinal:  Negative for abdominal pain, blood in stool, constipation, diarrhea and nausea.   Genitourinary:  Negative for dysuria, hematuria and urgency.   Musculoskeletal:  Negative for arthralgias, back pain and myalgias.   Skin:  Negative for rash and wound.   Neurological:  Negative for seizures, syncope and headaches.   Hematological:  Negative for adenopathy.   Psychiatric/Behavioral:  Negative for agitation and behavioral problems.      Medical History Reviewed by provider this encounter:  Tobacco  Allergies  Meds  Problems  Med Hx  Surg Hx  Fam Hx     .  Medications Ordered Prior to Encounter[1]   Social History[2]    Objective   /78 (BP Location: Left arm, Patient Position:  "Sitting, Cuff Size: Standard)   Pulse 91   Temp 98 °F (36.7 °C) (Tympanic)   Resp 16   Ht 5' 5.5\" (1.664 m)   Wt 98 kg (216 lb)   SpO2 99%   BMI 35.40 kg/m²     Physical Exam  Vitals and nursing note reviewed.   Constitutional:       Appearance: Normal appearance. She is well-developed. She is obese.   HENT:      Head: Normocephalic and atraumatic.      Right Ear: Tympanic membrane, ear canal and external ear normal.      Left Ear: Tympanic membrane, ear canal and external ear normal.      Nose: Nose normal.      Mouth/Throat:      Pharynx: No oropharyngeal exudate.     Eyes:      General: No scleral icterus.        Right eye: No discharge.         Left eye: No discharge.      Conjunctiva/sclera: Conjunctivae normal.      Pupils: Pupils are equal, round, and reactive to light.     Neck:      Thyroid: No thyromegaly.     Cardiovascular:      Rate and Rhythm: Normal rate and regular rhythm.      Heart sounds: No murmur heard.     No gallop.   Pulmonary:      Effort: Pulmonary effort is normal. No respiratory distress.      Breath sounds: Normal breath sounds. No wheezing or rales.   Abdominal:      Palpations: Abdomen is soft.      Tenderness: There is no abdominal tenderness.     Musculoskeletal:         General: No tenderness or deformity.      Cervical back: Normal range of motion.      Right lower leg: No edema.      Left lower leg: No edema.   Lymphadenopathy:      Cervical: No cervical adenopathy.     Skin:     General: Skin is warm.      Capillary Refill: Capillary refill takes less than 2 seconds.      Findings: No erythema or rash.     Neurological:      Mental Status: She is alert and oriented to person, place, and time.      Deep Tendon Reflexes: Reflexes normal.     Psychiatric:         Behavior: Behavior normal.         Thought Content: Thought content normal.         Judgment: Judgment normal.              [1]   Current Outpatient Medications on File Prior to Visit   Medication Sig Dispense Refill "    cetirizine (ZyrTEC) 10 mg tablet Take 1 tablet (10 mg total) by mouth daily 90 tablet 1    escitalopram (LEXAPRO) 10 mg tablet Take 1.5 tablets (15 mg total) by mouth daily 45 tablet 1    hydrOXYzine HCL (ATARAX) 50 mg tablet Take 1 tablet (50 mg total) by mouth every 6 (six) hours as needed for anxiety (hs prn) 30 tablet 1    inFLIXimab (REMICADE) 100 mg Inject into a catheter in a vein      ketoconazole (NIZORAL) 2 % shampoo as needed      LORazepam (Ativan) 0.5 mg tablet Take 1 tablet (0.5 mg total) by mouth daily at bedtime as needed for anxiety 30 tablet 1    ondansetron (ZOFRAN) 4 mg tablet Take 1 tablet (4 mg total) by mouth every 8 (eight) hours as needed for nausea or vomiting 20 tablet 0    pantoprazole (PROTONIX) 40 mg tablet TAKE 1 TABLET DAILY 90 tablet 1    [DISCONTINUED] lurasidone 60 MG TABS Take 0.6667 tablets (40 mg total) by mouth daily with dinner 30 tablet 1    [DISCONTINUED] phentermine 30 MG capsule Take by mouth      [DISCONTINUED] topiramate (TOPAMAX) 25 mg tablet Take 1 tablet (25 mg total) by mouth daily 30 tablet 2     No current facility-administered medications on file prior to visit.   [2]   Social History  Tobacco Use    Smoking status: Former     Current packs/day: 0.00     Types: Cigarettes     Quit date: 2020     Years since quittin.5    Smokeless tobacco: Never    Tobacco comments:     use Vape   Vaping Use    Vaping status: Former   Substance and Sexual Activity    Alcohol use: Not Currently    Drug use: Not Currently     Types: Marijuana     Comment: hasn't used since before pregnancy    Sexual activity: Yes

## 2025-06-13 ENCOUNTER — TELEMEDICINE (OUTPATIENT)
Dept: PSYCHIATRY | Facility: CLINIC | Age: 30
End: 2025-06-13
Payer: COMMERCIAL

## 2025-06-13 DIAGNOSIS — F31.4 BIPOLAR 1 DISORDER, DEPRESSED, SEVERE (HCC): ICD-10-CM

## 2025-06-13 DIAGNOSIS — F41.1 GENERALIZED ANXIETY DISORDER: ICD-10-CM

## 2025-06-13 PROCEDURE — 99214 OFFICE O/P EST MOD 30 MIN: CPT | Performed by: STUDENT IN AN ORGANIZED HEALTH CARE EDUCATION/TRAINING PROGRAM

## 2025-06-13 RX ORDER — ESCITALOPRAM OXALATE 5 MG/1
5 TABLET ORAL DAILY
Qty: 90 TABLET | Refills: 1 | Status: SHIPPED | OUTPATIENT
Start: 2025-06-13

## 2025-06-13 RX ORDER — LURASIDONE HYDROCHLORIDE 80 MG/1
80 TABLET, FILM COATED ORAL
Qty: 30 TABLET | Refills: 1 | Status: SHIPPED | OUTPATIENT
Start: 2025-06-13

## 2025-06-13 RX ORDER — HYDROXYZINE HYDROCHLORIDE 50 MG/1
TABLET, FILM COATED ORAL
Qty: 90 TABLET | Refills: 1 | Status: SHIPPED | OUTPATIENT
Start: 2025-06-13
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
## 2025-06-13 NOTE — ASSESSMENT & PLAN NOTE
Patient has had some increased mood symptoms lately and anxiety. Depression has recently been affected by ongoing stress and anxiety related to psychosocial stressors, particularly her daughter having increased behavioral issues that have coincided with changes in routine after the patient had transitioned work.  She has found some benefit from Latuda, though does have intermittent passive death wishes.  She denies having had any plan or intent to harm herself when she does have these thoughts and states that these thoughts have been less frequent recently.  She remains able to verbally plan for safety and denies any current SI, HI, or AVH.  She is logical and future oriented with her daughter has a good protective factor.  She does not demonstrate an imminent danger to her safety or to the safety of others.  Overall depressive symptoms have been fairly well managed, though she has had continued brief hypomanic symptoms.  She has reduced Lexapro to 10 mg daily and has been consistent with taking Latuda.    Increase Latuda to 80 mg daily with dinner.  Patient reports that she takes this approximately 30 minutes after she eats.  Decrease Lexapro to 5 mg daily.    Increase hydroxyzine to 100 mg nightly as needed for insomnia.  Can utilize 50 mg twice daily as needed for acute anxiety.  Continue Ativan 0.5 mg QHS PRN for insomnia and severe anxiety.    Continue individual psychotherapy.

## 2025-06-13 NOTE — ASSESSMENT & PLAN NOTE
The patient has had worsening of anxiety symptoms recently due to psychosocial stressors as above.  She has been struggling more with her sleep and has been exhausted.  She had initially found hydroxyzine to be quite helpful with her sleep, though lately has not been finding this to be as beneficial.  Trazodone was not particularly beneficial with that.  She has tried low-dose Ativan, with limited benefit as well.  Patient notes that insomnia has been a chronic issue for her and currently her daughter's behaviors are slightly improved.  She is agreeable to titration of Atarax as above and continued occasional use of Ativan.    See plan for Bipolar disorder.

## 2025-06-17 LAB
ALBUMIN SERPL-MCNC: 4.3 G/DL (ref 3.6–5.1)
ALBUMIN/GLOB SERPL: 1.7 (CALC) (ref 1–2.5)
ALP SERPL-CCNC: 75 U/L (ref 31–125)
ALT SERPL-CCNC: 15 U/L (ref 6–29)
AST SERPL-CCNC: 13 U/L (ref 10–30)
BASOPHILS # BLD AUTO: 61 CELLS/UL (ref 0–200)
BASOPHILS NFR BLD AUTO: 0.9 %
BILIRUB SERPL-MCNC: 0.4 MG/DL (ref 0.2–1.2)
BUN SERPL-MCNC: 14 MG/DL (ref 7–25)
BUN/CREAT SERPL: 14 (CALC) (ref 6–22)
CALCIUM SERPL-MCNC: 9.5 MG/DL (ref 8.6–10.2)
CHLORIDE SERPL-SCNC: 105 MMOL/L (ref 98–110)
CHOLEST SERPL-MCNC: 195 MG/DL
CHOLEST/HDLC SERPL: 3 (CALC)
CO2 SERPL-SCNC: 26 MMOL/L (ref 20–32)
CREAT SERPL-MCNC: 1.02 MG/DL (ref 0.5–0.96)
EOSINOPHIL # BLD AUTO: 88 CELLS/UL (ref 15–500)
EOSINOPHIL NFR BLD AUTO: 1.3 %
ERYTHROCYTE [DISTWIDTH] IN BLOOD BY AUTOMATED COUNT: 12.6 % (ref 11–15)
GFR/BSA.PRED SERPLBLD CYS-BASED-ARV: 77 ML/MIN/1.73M2
GLOBULIN SER CALC-MCNC: 2.6 G/DL (CALC) (ref 1.9–3.7)
GLUCOSE SERPL-MCNC: 81 MG/DL (ref 65–99)
HCT VFR BLD AUTO: 40 % (ref 35–45)
HDLC SERPL-MCNC: 66 MG/DL
HGB BLD-MCNC: 13 G/DL (ref 11.7–15.5)
LDLC SERPL CALC-MCNC: 113 MG/DL (CALC)
LYMPHOCYTES # BLD AUTO: 2958 CELLS/UL (ref 850–3900)
LYMPHOCYTES NFR BLD AUTO: 43.5 %
MCH RBC QN AUTO: 29.1 PG (ref 27–33)
MCHC RBC AUTO-ENTMCNC: 32.5 G/DL (ref 32–36)
MCV RBC AUTO: 89.5 FL (ref 80–100)
MONOCYTES # BLD AUTO: 598 CELLS/UL (ref 200–950)
MONOCYTES NFR BLD AUTO: 8.8 %
NEUTROPHILS # BLD AUTO: 3094 CELLS/UL (ref 1500–7800)
NEUTROPHILS NFR BLD AUTO: 45.5 %
NONHDLC SERPL-MCNC: 129 MG/DL (CALC)
PLATELET # BLD AUTO: 225 THOUSAND/UL (ref 140–400)
PMV BLD REES-ECKER: 11.5 FL (ref 7.5–12.5)
POTASSIUM SERPL-SCNC: 4.3 MMOL/L (ref 3.5–5.3)
PROT SERPL-MCNC: 6.9 G/DL (ref 6.1–8.1)
RBC # BLD AUTO: 4.47 MILLION/UL (ref 3.8–5.1)
SODIUM SERPL-SCNC: 139 MMOL/L (ref 135–146)
TRIGL SERPL-MCNC: 75 MG/DL
TSH SERPL-ACNC: 1.1 MIU/L
WBC # BLD AUTO: 6.8 THOUSAND/UL (ref 3.8–10.8)

## 2025-06-24 DIAGNOSIS — E66.01 SEVERE OBESITY (BMI 35.0-39.9) WITH COMORBIDITY (HCC): Primary | ICD-10-CM

## 2025-06-24 RX ORDER — TIRZEPATIDE 2.5 MG/.5ML
2.5 INJECTION, SOLUTION SUBCUTANEOUS WEEKLY
Qty: 2 ML | Refills: 0 | Status: SHIPPED | OUTPATIENT
Start: 2025-06-24 | End: 2025-07-22

## 2025-06-25 ENCOUNTER — TELEPHONE (OUTPATIENT)
Age: 30
End: 2025-06-25

## 2025-06-25 NOTE — TELEPHONE ENCOUNTER
PA for tirzepatide (Zepbound) 2.5 mg/0.5 mL auto-injector SUBMITTED to [facility]    via    []CMM-KEY:    [x]Surescripts-Case ID #  [id number]   []Availity-Auth ID #  NDC #    []Faxed to plan   []Other website    []Phone call Case ID #      [x]PA sent as URGENT    All office notes, labs and other pertaining documents and studies sent. Clinical questions answered. Awaiting determination from insurance company.     Turnaround time for your insurance to make a decision on your Prior Authorization can take 7-21 business days.

## 2025-06-25 NOTE — TELEPHONE ENCOUNTER
PA for tirzepatide (Zepbound) 2.5 mg/0.5 mL auto-injector APPROVED     Date(s) approved 6/25/2025 - 2/20/2026    Case # [id number]     Patient advised by          []Augusthart Message  []Phone call   [x]LMOM  []L/M to call office as no active Communication consent on file  []Unable to leave detailed message as VM not approved on Communication consent       Pharmacy advised by    [x]Fax  []Phone call  []Secure Chat    Specialty Pharmacy    []      Approval letter scanned into Media Yes

## 2025-07-07 ENCOUNTER — TELEMEDICINE (OUTPATIENT)
Dept: PSYCHIATRY | Facility: CLINIC | Age: 30
End: 2025-07-07
Payer: COMMERCIAL

## 2025-07-07 DIAGNOSIS — F41.1 GENERALIZED ANXIETY DISORDER: ICD-10-CM

## 2025-07-07 DIAGNOSIS — F31.4 BIPOLAR 1 DISORDER, DEPRESSED, SEVERE (HCC): Primary | ICD-10-CM

## 2025-07-07 PROCEDURE — 99214 OFFICE O/P EST MOD 30 MIN: CPT | Performed by: STUDENT IN AN ORGANIZED HEALTH CARE EDUCATION/TRAINING PROGRAM

## 2025-07-07 RX ORDER — LURASIDONE HYDROCHLORIDE 80 MG/1
80 TABLET, FILM COATED ORAL
Qty: 90 TABLET | Refills: 1 | Status: SHIPPED | OUTPATIENT
Start: 2025-07-07

## 2025-07-07 RX ORDER — ESCITALOPRAM OXALATE 10 MG/1
5 TABLET ORAL DAILY
Qty: 90 TABLET | Refills: 1 | Status: SHIPPED | OUTPATIENT
Start: 2025-07-21
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
## 2025-07-07 NOTE — ASSESSMENT & PLAN NOTE
Patient has had some increased mood symptoms lately and anxiety. Depression has recently been affected by ongoing stress and anxiety related to psychosocial stressors, particularly her daughter having increased behavioral issues that have coincided with changes in routine after the patient had transitioned work.  She has found some benefit from Latuda, though does have intermittent passive death wishes.  She denies having had any plan or intent to harm herself when she does have these thoughts and states that these thoughts have been less frequent recently.  She remains able to verbally plan for safety and denies any current SI, HI, or AVH.  She is logical and future oriented with her daughter has a good protective factor.  She does not demonstrate an imminent danger to her safety or to the safety of others.  Hypomanic symptoms have improved significantly with increased dose of Latuda.  However, depressive symptoms appear to be more prevalent at this time.  We will plan to increase Lexapro back to 10 mg daily steadily, with initial increase to 7.5 mg daily for 2 weeks.  We will monitor for emergence of any recurrent hypomanic symptoms.    Continue Latuda 80 mg daily with dinner.  Increase Lexapro to 7.5 mg daily for 2 weeks, then increase to milligrams daily.    Can continue hydroxyzine 50 mg 3 times daily for anxiety.  Continue Ativan 0.5 mg QHS PRN for insomnia and severe anxiety.    Continue individual psychotherapy.

## 2025-07-09 ENCOUNTER — OFFICE VISIT (OUTPATIENT)
Dept: FAMILY MEDICINE CLINIC | Facility: CLINIC | Age: 30
End: 2025-07-09
Payer: COMMERCIAL

## 2025-07-09 VITALS
OXYGEN SATURATION: 98 % | WEIGHT: 216.8 LBS | HEIGHT: 66 IN | TEMPERATURE: 98 F | BODY MASS INDEX: 34.84 KG/M2 | DIASTOLIC BLOOD PRESSURE: 72 MMHG | RESPIRATION RATE: 16 BRPM | HEART RATE: 88 BPM | SYSTOLIC BLOOD PRESSURE: 108 MMHG

## 2025-07-09 DIAGNOSIS — Z30.011 INITIATION OF OCP (BCP): ICD-10-CM

## 2025-07-09 DIAGNOSIS — F31.4 BIPOLAR 1 DISORDER, DEPRESSED, SEVERE (HCC): Primary | ICD-10-CM

## 2025-07-09 DIAGNOSIS — E66.812 OBESITY, CLASS II, BMI 35-39.9: ICD-10-CM

## 2025-07-09 PROCEDURE — 99214 OFFICE O/P EST MOD 30 MIN: CPT | Performed by: FAMILY MEDICINE

## 2025-07-09 RX ORDER — TIRZEPATIDE 5 MG/.5ML
5 INJECTION, SOLUTION SUBCUTANEOUS WEEKLY
Qty: 2 ML | Refills: 2 | Status: SHIPPED | OUTPATIENT
Start: 2025-07-09

## 2025-07-09 RX ORDER — NORETHINDRONE ACETATE AND ETHINYL ESTRADIOL 1MG-20(21)
1 KIT ORAL DAILY
Qty: 84 TABLET | Refills: 3 | Status: SHIPPED | OUTPATIENT
Start: 2025-07-09

## 2025-07-09 NOTE — PROGRESS NOTES
Name: Vickie Stock      : 1995     MRN: [id number]  Encounter Provider: Ashley Butler MD  Encounter Date: 2025  Encounter department: [facility]    Assessment & Plan  Bipolar 1 disorder, depressed, severe (HCC)  Primary management per psychiatry       Obesity, Class II, BMI 35-39.9    Increase Zepbound to 5 mg weekly, continue 1500 calorie diet, 150 minutes per exercise per week, 64 ounces of water daily  Orders:    tirzepatide (Zepbound) 5 mg/0.5 mL auto-injector; Inject 0.5 mL (5 mg total) under the skin once a week    Initiation of OCP (BCP)  Recommend starting oral contraceptive pills on day one of her, use condoms until she will follow-up with GYN to discuss Mirena Orders:    norethindrone-ethinyl estradiol (Junel FE 1/20) 1-20 MG-MCG per tablet; Take 1 tablet by mouth daily                   Read package inserts for all medications before starting a new medications, call me if you have any questions.    Patient was given opportunity to ask questions and all questions were answered.    Subjective:     Vickie Stock is a 30 y.o. female.    Presents for follow-up.  Overall is doing better, has started Sapone 2.5 mg weekly for the past 2 weeks and states that she overall feels fatigued however is not sure if this is related to self bound or something else.  She also could be dehydrated.  She is compliant with diet and exercise.  She also reports that she has not been sexually active and is not on any contraception but does plan on starting contraception.    Anxiety and depression, bipolar 1 disorder her psych meds were recently adjusted, escitalopram was reduced to 10 mg and she has been on Latuda 80 mg.  She does follow with psychiatry  Crohn's disease-she is on infliximab and doing well        Past Medical History[1]    Family History[2]    Past Surgical History[3]     reports that she quit smoking about 4 years ago. Her smoking use included cigarettes. She has  never used smokeless tobacco. She reports that she does not currently use alcohol. She reports that she does not currently use drugs after having used the following drugs: Marijuana.    Current Medications[4]    The following portions of the patient's history were reviewed and updated as appropriate: allergies, current medications, past family history, past medical history, past social history, past surgical history and problem list.    Review of Systems   Constitutional:  Positive for fatigue. Negative for fever.   HENT:  Negative for congestion, facial swelling, mouth sores, rhinorrhea, sore throat and trouble swallowing.    Eyes:  Negative for pain and redness.   Respiratory:  Negative for cough, shortness of breath and wheezing.    Cardiovascular:  Negative for chest pain, palpitations and leg swelling.   Gastrointestinal:  Negative for abdominal pain, blood in stool, constipation, diarrhea and nausea.   Genitourinary:  Negative for dysuria, hematuria and urgency.   Musculoskeletal:  Negative for arthralgias, back pain and myalgias.   Skin:  Negative for rash and wound.   Neurological:  Negative for seizures, syncope and headaches.   Hematological:  Negative for adenopathy.   Psychiatric/Behavioral:  Negative for agitation and behavioral problems.          PHQ-2/9 Depression Screening    Little interest or pleasure in doing things: 0 - not at all  Feeling down, depressed, or hopeless: 0 - not at all  Trouble falling or staying asleep, or sleeping too much: 0 - not at all  Feeling tired or having little energy: 0 - not at all  Poor appetite or overeatin - not at all  Feeling bad about yourself - or that you are a failure or have let yourself or your family down: 0 - not at all  Trouble concentrating on things, such as reading the newspaper or watching television: 0 - not at all  Moving or speaking so slowly that other people could have noticed. Or the opposite - being so fidgety or restless that you have been  "moving around a lot more than usual: 0 - not at all  Thoughts that you would be better off dead, or of hurting yourself in some way: 0 - not at all  PHQ-9 Score: 0  PHQ-9 Interpretation: No or Minimal depression       KATELYNN-7 Flowsheet Screening      Flowsheet Row Most Recent Value   Over the last two weeks, how often have you been bothered by the following problems?     Feeling nervous, anxious, or on edge 0   Not being able to stop or control worrying 0   Worrying too much about different things 0   Trouble relaxing  0   Being so restless that it's hard to sit still 0   Becoming easily annoyed or irritable  0   Feeling afraid as if something awful might happen 0   KATELYNN Score  0              Objective:    /72 (BP Location: Right arm, Patient Position: Sitting, Cuff Size: Adult)   Pulse 88   Temp 98 °F (36.7 °C) (Tympanic)   Resp 16   Ht 5' 5.5\" (1.664 m)   Wt 98.3 kg (216 lb 12.8 oz)   LMP 07/06/2025 (Exact Date)   SpO2 98%   BMI 35.53 kg/m²      Physical Exam  Vitals and nursing note reviewed.   Constitutional:       Appearance: Normal appearance. She is well-developed. She is obese.   HENT:      Head: Normocephalic and atraumatic.      Right Ear: External ear normal.      Left Ear: External ear normal.      Nose: Nose normal.     Eyes:      General: No scleral icterus.        Right eye: No discharge.         Left eye: No discharge.      Conjunctiva/sclera: Conjunctivae normal.      Pupils: Pupils are equal, round, and reactive to light.     Neck:      Thyroid: No thyromegaly.     Cardiovascular:      Rate and Rhythm: Normal rate and regular rhythm.      Heart sounds: Normal heart sounds. No murmur heard.     No gallop.   Pulmonary:      Effort: Pulmonary effort is normal.      Breath sounds: Normal breath sounds. No wheezing or rales.   Abdominal:      General: There is no distension.      Palpations: Abdomen is soft.      Tenderness: There is no abdominal tenderness.     Musculoskeletal:         " General: No tenderness or deformity.      Cervical back: Normal range of motion and neck supple.   Lymphadenopathy:      Cervical: No cervical adenopathy.     Skin:     Findings: No erythema or rash.     Neurological:      General: No focal deficit present.      Mental Status: She is alert. Mental status is at baseline.     Psychiatric:         Mood and Affect: Mood normal.         Behavior: Behavior normal.           Recent Results (from the past 52 weeks)   CBC and differential    Collection Time: 12/27/24 10:15 AM   Result Value Ref Range    WBC 9.68 4.31 - 10.16 Thousand/uL    RBC 4.70 3.81 - 5.12 Million/uL    Hemoglobin 13.5 11.5 - 15.4 g/dL    Hematocrit 42.0 34.8 - 46.1 %    MCV 89 82 - 98 fL    MCH 28.7 26.8 - 34.3 pg    MCHC 32.1 31.4 - 37.4 g/dL    RDW 13.8 11.6 - 15.1 %    MPV 11.4 8.9 - 12.7 fL    Platelets 209 149 - 390 Thousands/uL    nRBC 0 /100 WBCs    Segmented % 58 43 - 75 %    Immature Grans % 0 0 - 2 %    Lymphocytes % 32 14 - 44 %    Monocytes % 8 4 - 12 %    Eosinophils Relative 1 0 - 6 %    Basophils Relative 1 0 - 1 %    Absolute Neutrophils 5.63 1.85 - 7.62 Thousands/µL    Absolute Immature Grans 0.02 0.00 - 0.20 Thousand/uL    Absolute Lymphocytes 3.11 0.60 - 4.47 Thousands/µL    Absolute Monocytes 0.76 0.17 - 1.22 Thousand/µL    Eosinophils Absolute 0.07 0.00 - 0.61 Thousand/µL    Basophils Absolute 0.09 0.00 - 0.10 Thousands/µL   Basic metabolic panel    Collection Time: 12/27/24 10:15 AM   Result Value Ref Range    Sodium 138 135 - 147 mmol/L    Potassium 3.8 3.5 - 5.3 mmol/L    Chloride 105 96 - 108 mmol/L    CO2 28 21 - 32 mmol/L    ANION GAP 5 4 - 13 mmol/L    BUN 11 5 - 25 mg/dL    Creatinine 0.82 0.60 - 1.30 mg/dL    Glucose 89 65 - 140 mg/dL    Calcium 9.1 8.4 - 10.2 mg/dL    eGFR 98 ml/min/1.73sq m   Basic metabolic panel    Collection Time: 12/28/24  5:08 AM   Result Value Ref Range    Sodium 139 135 - 147 mmol/L    Potassium 3.7 3.5 - 5.3 mmol/L    Chloride 108 96 - 108 mmol/L     CO2 23 21 - 32 mmol/L    ANION GAP 8 4 - 13 mmol/L    BUN 13 5 - 25 mg/dL    Creatinine 0.76 0.60 - 1.30 mg/dL    Glucose 112 65 - 140 mg/dL    Calcium 8.5 8.4 - 10.2 mg/dL    eGFR 107 ml/min/1.73sq m   CBC and differential    Collection Time: 12/28/24  5:08 AM   Result Value Ref Range    WBC 8.92 4.31 - 10.16 Thousand/uL    RBC 4.61 3.81 - 5.12 Million/uL    Hemoglobin 13.1 11.5 - 15.4 g/dL    Hematocrit 41.0 34.8 - 46.1 %    MCV 89 82 - 98 fL    MCH 28.4 26.8 - 34.3 pg    MCHC 32.0 31.4 - 37.4 g/dL    RDW 13.9 11.6 - 15.1 %    MPV 11.6 8.9 - 12.7 fL    Platelets 224 149 - 390 Thousands/uL    nRBC 0 /100 WBCs    Segmented % 50 43 - 75 %    Immature Grans % 0 0 - 2 %    Lymphocytes % 41 14 - 44 %    Monocytes % 7 4 - 12 %    Eosinophils Relative 1 0 - 6 %    Basophils Relative 1 0 - 1 %    Absolute Neutrophils 4.44 1.85 - 7.62 Thousands/µL    Absolute Immature Grans 0.03 0.00 - 0.20 Thousand/uL    Absolute Lymphocytes 3.67 0.60 - 4.47 Thousands/µL    Absolute Monocytes 0.60 0.17 - 1.22 Thousand/µL    Eosinophils Absolute 0.11 0.00 - 0.61 Thousand/µL    Basophils Absolute 0.07 0.00 - 0.10 Thousands/µL   Vancomycin, trough Draw vanco level PERIPHERALLY at scheduled time/AM labs. Please contact Pharmacy with results and any questions.    Collection Time: 12/29/24  4:35 AM   Result Value Ref Range    Vancomycin Tr 10.2 10.0 - 20.0 ug/mL   Basic metabolic panel    Collection Time: 12/29/24  4:35 AM   Result Value Ref Range    Sodium 138 135 - 147 mmol/L    Potassium 4.2 3.5 - 5.3 mmol/L    Chloride 107 96 - 108 mmol/L    CO2 28 21 - 32 mmol/L    ANION GAP 3 (L) 4 - 13 mmol/L    BUN 10 5 - 25 mg/dL    Creatinine 0.68 0.60 - 1.30 mg/dL    Glucose 108 65 - 140 mg/dL    Calcium 8.8 8.4 - 10.2 mg/dL    eGFR 120 ml/min/1.73sq m   CBC and differential    Collection Time: 12/29/24  4:35 AM   Result Value Ref Range    WBC 14.19 (H) 4.31 - 10.16 Thousand/uL    RBC 4.37 3.81 - 5.12 Million/uL    Hemoglobin 12.7 11.5 - 15.4 g/dL     Hematocrit 39.1 34.8 - 46.1 %    MCV 90 82 - 98 fL    MCH 29.1 26.8 - 34.3 pg    MCHC 32.5 31.4 - 37.4 g/dL    RDW 13.5 11.6 - 15.1 %    MPV 11.6 8.9 - 12.7 fL    Platelets 224 149 - 390 Thousands/uL    nRBC 0 /100 WBCs    Segmented % 75 43 - 75 %    Immature Grans % 1 0 - 2 %    Lymphocytes % 16 14 - 44 %    Monocytes % 8 4 - 12 %    Eosinophils Relative 0 0 - 6 %    Basophils Relative 0 0 - 1 %    Absolute Neutrophils 10.73 (H) 1.85 - 7.62 Thousands/µL    Absolute Immature Grans 0.08 0.00 - 0.20 Thousand/uL    Absolute Lymphocytes 2.24 0.60 - 4.47 Thousands/µL    Absolute Monocytes 1.07 0.17 - 1.22 Thousand/µL    Eosinophils Absolute 0.02 0.00 - 0.61 Thousand/µL    Basophils Absolute 0.05 0.00 - 0.10 Thousands/µL   Magnesium    Collection Time: 12/29/24  4:35 AM   Result Value Ref Range    Magnesium 1.9 1.9 - 2.7 mg/dL   Chlamydia/GC amplified DNA by PCR    Collection Time: 01/08/25  3:52 PM    Specimen: Cervix; Thin-Prep Vial   Result Value Ref Range    N gonorrhoeae, DNA Probe Negative Negative    Chlamydia trachomatis, DNA Probe Negative Negative   Liquid-based pap, screening    Collection Time: 01/08/25  3:52 PM   Result Value Ref Range    Case Report       Gynecologic Cytology Report                       Case: [id number]                                  Authorizing Provider:  Daniela Andino MD  Collected:           01/08/2025 [facility]              Ordering Location:     [facility]      Received:            01/08/2025 [facility]                                     [facility]                                                                               [place]                                                                    First Screen:          Nicky Astorga                                                            Rescreen:              MING García                                                    Specimen:    LIQUID-BASED PAP, SCREENING, Cervix                                                         Primary Interpretation Negative for intraepithelial lesion or malignancy     Specimen Adequacy       Satisfactory for evaluation. Endocervical/transformation zone component present.    Additional Information       [employer]'s FDA approved ,  and ThinPrep Imaging Duo System are utilized with strict adherence to the 's instruction manual to prepare gynecologic and non-gynecologic cytology specimens for the production of ThinPrep slides as well as for gynecologic ThinPrep imaging. These processes have been validated by our laboratory and/or by the .  The Pap test is not a diagnostic procedure and should not be used as the sole means to detect cervical cancer. It is only a screening procedure to aid in the detection of cervical cancer and its precursors. Both false-negative and false-positive results have been experienced. Your patient's test result should be interpreted in this context together with the history and clinical findings.      Gross Description       20 ml , colorless, cloudy received in a ThinPrep vial.     Vaginosis DNA Probe    Collection Time: 01/08/25  3:52 PM    Specimen: Vaginal; Genital   Result Value Ref Range    Trichomonas vaginalis Not Detected Not Detected    Gardnerella vaginalis Not Detected Not Detected    Candida Species Not Detected Not Detected   HIV 1/2 AG/AB w Reflex [facility] for 2 yr old and above    Collection Time: 01/08/25  5:03 PM   Result Value Ref Range    HIV-1 p24 Antigen Non-Reactive Non-Reactive    HIV-1 Antibody Non-Reactive Non-Reactive    HIV-2 Antibody Non-Reactive Non-Reactive    HIV Ag-Ab 5th Gen Non-Reactive Non-Reactive   Hepatitis C antibody    Collection Time: 01/08/25  5:03 PM   Result Value Ref Range    Hepatitis C Ab Non-reactive Non-Reactive   Hepatitis B surface antigen    Collection Time: 01/08/25  5:03 PM   Result Value Ref Range    Hepatitis B Surface Ag Non-reactive Non-Reactive   TSH, 3rd  generation    Collection Time: 01/08/25  5:03 PM   Result Value Ref Range    TSH 3RD GENERATION 2.134 0.450 - 4.500 uIU/mL   CBC and differential    Collection Time: 06/17/25  7:19 AM   Result Value Ref Range    White Blood Cell Count 6.8 3.8 - 10.8 Thousand/uL    Red Blood Cell Count 4.47 3.80 - 5.10 Million/uL    Hemoglobin 13.0 11.7 - 15.5 g/dL    HCT 40.0 35.0 - 45.0 %    MCV 89.5 80.0 - 100.0 fL    MCH 29.1 27.0 - 33.0 pg    MCHC 32.5 32.0 - 36.0 g/dL    RDW 12.6 11.0 - 15.0 %    Platelet Count 225 140 - 400 Thousand/uL    SL AMB MPV 11.5 7.5 - 12.5 fL    Neutrophils (Absolute) 3,094 1,500 - 7,800 cells/uL    Lymphocytes (Absolute) 2,958 850 - 3,900 cells/uL    Monocytes (Absolute) 598 200 - 950 cells/uL    Eosinophils (Absolute) 88 15 - 500 cells/uL    Basophils ABS 61 0 - 200 cells/uL    Neutrophils 45.5 %    Lymphocytes 43.5 %    Monocytes 8.8 %    Eosinophils 1.3 %    Basophils PCT 0.9 %   Comprehensive metabolic panel    Collection Time: 06/17/25  7:19 AM   Result Value Ref Range    Glucose, Random 81 65 - 99 mg/dL    BUN 14 7 - 25 mg/dL    Creatinine 1.02 (H) 0.50 - 0.96 mg/dL    eGFR 77 > OR = 60 mL/min/1.73m2    SL AMB BUN/CREATININE RATIO 14 6 - 22 (calc)    Sodium 139 135 - 146 mmol/L    Potassium 4.3 3.5 - 5.3 mmol/L    Chloride 105 98 - 110 mmol/L    CO2 26 20 - 32 mmol/L    Calcium 9.5 8.6 - 10.2 mg/dL    Protein, Total 6.9 6.1 - 8.1 g/dL    Albumin 4.3 3.6 - 5.1 g/dL    Globulin 2.6 1.9 - 3.7 g/dL (calc)    Albumin/Globulin Ratio 1.7 1.0 - 2.5 (calc)    TOTAL BILIRUBIN 0.4 0.2 - 1.2 mg/dL    Alkaline Phosphatase 75 31 - 125 U/L    AST 13 10 - 30 U/L    ALT 15 6 - 29 U/L   Lipid panel    Collection Time: 06/17/25  7:19 AM   Result Value Ref Range    Total Cholesterol 195 <200 mg/dL    HDL 66 > OR = 50 mg/dL    Triglycerides 75 <150 mg/dL    LDL Calculated 113 (H) mg/dL (calc)    Chol HDLC Ratio 3.0 <5.0 (calc)    Non-HDL Cholesterol 129 <130 mg/dL (calc)   TSH, 3rd generation with Free T4 reflex     "Collection Time: 06/17/25  7:19 AM   Result Value Ref Range    TSH W/RFX TO FREE T4 1.10 mIU/L       Laboratory Results: I have personally reviewed the pertinent laboratory results/reports     Radiology/Other Diagnostic Testing Results: I have reviewed the following imaging and agree with the interpretation below.    CT Orbits w contrast  Result Date: 12/27/2024  CT ORBITS WITH CONTRAST. INDICATION:   Right eye concern for orbital cellulitis. COMPARISON: None TECHNIQUE: Axial CT imaging through the orbits was performed.  In addition, sagittal and coronal reformatted images were submitted for interpretation. Radiation dose length product (DLP) for this visit:  196.94 mGy-cm .  This examination, like all CT scans performed in the [facility], was performed utilizing techniques to minimize radiation dose exposure, including the use of iterative  reconstruction and automated exposure control. IV Contrast:  85 mL of iohexol (OMNIPAQUE) IMAGE QUALITY: Diagnostic. FINDINGS: ORBITS: There is right periorbital soft tissue swelling. There is mild asymmetric episcleral enhancement. There is no retrobulbar extension of disease. The extraocular muscles are within normal limits. SINUSES: Clear paranasal sinuses. SOFT TISSUES: Please see above. BONY STRUCTURES: Normal bony structures.     Right-sided periorbital soft tissue swelling consistent with recent septal cellulitis. Mild adjacent episcleral enhancement is suggestive of episcleritis. Otherwise no juan postseptal extension of disease identified. No subperiosteal collections are seen. Workstation performed: [id number]        Disclaimer: Portions of the record may have been created with voice recognition software. Occasional wrong word or \"sound a like\" substitutions may have occurred due to the inherent limitations of voice recognition software. Read the chart carefully and recognize, using context, where substitutions have occurred. I have used the Epic " copy/forward function to compose this note. I have reviewed my current note to ensure it reflects the current patient status, exam, assessment and plan.         [1]   Past Medical History:  Diagnosis Date    Chronic back pain     Colitis     History of transfusion     Multiple abrasions 2013    Proteinuria     Shoulder disorder 11/15/2018    Urinary tract infection     pylonephritis   [2]   Family History  Problem Relation Name Age of Onset    Hypertension Mother      Arthritis Mother      Depression Mother      COPD Mother      Gestational diabetes Mother      COPD Father      Depression Sister      Anxiety disorder Sister      Cholelithiasis Sister      Depression Brother      Bipolar disorder Brother      Depression Brother      Depression Brother      No Known Problems Maternal Grandmother      COPD Maternal Grandfather      Cancer Maternal Grandfather      Lung cancer Paternal Grandmother      Cancer Paternal Grandmother      Kidney cancer Paternal Grandmother      Heart disease Paternal Grandfather      Skin cancer Maternal Uncle     [3]   Past Surgical History:  Procedure Laterality Date    COLONOSCOPY      DILATION AND CURETTAGE OF UTERUS N/A 08/23/2021    Procedure: DILATATION AND CURETTAGE (D&C);  Surgeon: Rona Puga MD;  [facility];  Service: Obstetrics    EXAMINATION UNDER ANESTHESIA N/A 08/23/2021    Procedure: EXAM UNDER ANESTHESIA (EUA) WITH INSERTION OF BAKRI BALLOON;  Surgeon: Rona Puga MD;  [facility];  Service: Obstetrics    TONSILLECTOMY  2009    UPPER GASTROINTESTINAL ENDOSCOPY      WISDOM TOOTH EXTRACTION     [4]   Current Outpatient Medications:     cetirizine (ZyrTEC) 10 mg tablet, Take 1 tablet (10 mg total) by mouth daily, Disp: 90 tablet, Rfl: 1    [START ON 7/21/2025] escitalopram (LEXAPRO) 10 mg tablet, Take 0.5 tablets (5 mg total) by mouth daily Do not start before July 21, 2025., Disp: 90 tablet, Rfl: 1    hydrOXYzine HCL (ATARAX) 50 mg tablet, Take  1 tablet twice daily as needed for anxiety and take 2 tablets nightly as needed for anxiety and insomnia, Disp: 90 tablet, Rfl: 1    inFLIXimab (REMICADE) 100 mg, Inject into a catheter in a vein, Disp: , Rfl:     ketoconazole (NIZORAL) 2 % shampoo, as needed, Disp: , Rfl:     LORazepam (Ativan) 0.5 mg tablet, Take 1 tablet (0.5 mg total) by mouth daily at bedtime as needed for anxiety, Disp: 30 tablet, Rfl: 1    lurasidone (LATUDA) 80 mg tablet, Take 1 tablet (80 mg total) by mouth daily with dinner, Disp: 90 tablet, Rfl: 1    norethindrone-ethinyl estradiol (Junel FE 1/20) 1-20 MG-MCG per tablet, Take 1 tablet by mouth daily, Disp: 84 tablet, Rfl: 3    ondansetron (ZOFRAN) 4 mg tablet, Take 1 tablet (4 mg total) by mouth every 8 (eight) hours as needed for nausea or vomiting, Disp: 20 tablet, Rfl: 0    pantoprazole (PROTONIX) 40 mg tablet, TAKE 1 TABLET DAILY, Disp: 90 tablet, Rfl: 1    tirzepatide (Zepbound) 5 mg/0.5 mL auto-injector, Inject 0.5 mL (5 mg total) under the skin once a week, Disp: 2 mL, Rfl: 2

## 2025-07-09 NOTE — ASSESSMENT & PLAN NOTE
Increase Zepbound to 5 mg weekly, continue 1500 calorie diet, 150 minutes per exercise per week, 64 ounces of water daily  Orders:    tirzepatide (Zepbound) 5 mg/0.5 mL auto-injector; Inject 0.5 mL (5 mg total) under the skin once a week

## 2025-07-17 ENCOUNTER — OFFICE VISIT (OUTPATIENT)
Dept: GASTROENTEROLOGY | Facility: AMBULARY SURGERY CENTER | Age: 30
End: 2025-07-17
Payer: COMMERCIAL

## 2025-07-17 VITALS
SYSTOLIC BLOOD PRESSURE: 126 MMHG | BODY MASS INDEX: 36.22 KG/M2 | WEIGHT: 217.4 LBS | HEART RATE: 89 BPM | HEIGHT: 65 IN | OXYGEN SATURATION: 98 % | DIASTOLIC BLOOD PRESSURE: 84 MMHG

## 2025-07-17 DIAGNOSIS — K50.00 CROHN'S DISEASE OF ILEUM WITHOUT COMPLICATION (HCC): Primary | ICD-10-CM

## 2025-07-17 PROCEDURE — 99214 OFFICE O/P EST MOD 30 MIN: CPT | Performed by: PHYSICIAN ASSISTANT

## 2025-07-17 RX ORDER — TOPIRAMATE 25 MG/1
1 TABLET, FILM COATED ORAL 2 TIMES DAILY
COMMUNITY
Start: 2025-06-12

## 2025-07-17 NOTE — PROGRESS NOTES
Name: Vickie Stock      : 1995      MRN: [id number]  Encounter Provider: Pamela Gray PA-C  Encounter Date: 2025   Encounter department: [facility]  :  Assessment & Plan  Crohn's disease of ileum without complication (HCC)  Patient has been on Remicade every 8 weeks, endorses some GI symptomatology including irregular bowel habits and occasional bright red blood per rectum which are generally relieved for about a week after infusions.  Last MRI last year suggested active disease still occurring in the terminal ileum, her last colonoscopy was done in  when she was diagnosed.  Should evaluate for ongoing ileitis or potentially developing stricture or complication, also consider possibility that she may be developing antibodies to Remicade     - Will plan for colonoscopy    - Procedure was explained in detail to the patient at this time including associated risks and benefits    - Instructions provided for colonoscopy prep    - Will check Remicade drug and antibody level, instructed patient to get this drawn within 1 week prior to her next scheduled Remicade infusion    - Will also check QuantiFERON TB testing, and stool calprotectin as previously recommended    - Referral was given again for dermatology evaluation, patient says she has not been able to get this done yet  Orders:    Colonoscopy; Future    Infliximab Concentration and Anti-Infliximab Antibody    Quantiferon TB Gold Plus Assay    Calprotectin,Fecal    Ambulatory Referral to Dermatology; Future        History of Present Illness   Vickie Stock is a 30 y.o. female with history of Crohn's disease diagnosed in , on Remicade every 8 weeks for maintenance who presents for follow-up, she was last seen in our office in 2024 with Dr. Lechuga, at that time having been noted with ongoing disease in the terminal ileum evidenced on MR enterography in 2024, though length of affected  segment appeared to be decreased to 13 cm from 20 cm seen on a previous imaging study.  Nonetheless given the concern for ongoing disease she was recommended to have colonoscopy done and have Remicade drug and antibody level testing, QuantiFERON testing, skin cancer screening by dermatology.  Patient says that she is a single mother with a full-time job and has some difficulty coordinating transportation among contact who would be able to drive her for colonoscopy so she was unable to get the colonoscopy done, there was also some confusion about what labs were getting done when she went to go have her lab work done so she did not have her other order labs done either.  At any rate, she says that her next Remicade infusion is scheduled for 8/18, she says that for the week after her infusion she generally does very well but after about a week or so she does start to get issues with irregular bowel habits again, generally constipation for a couple of days with difficult to pass stools, followed by cramping and diarrhea which can last for about a day.  Occasionally has some bright red blood mixed with stools although small amounts and not consistently.  Occasionally has nausea, no vomiting.  No unintentional weight loss.  HPI    Review of Systems   Constitutional:  Negative for activity change, appetite change, chills, fatigue, fever and unexpected weight change.   HENT:  Negative for congestion, nosebleeds, rhinorrhea, sore throat and trouble swallowing.    Eyes:  Negative for pain, itching and visual disturbance.   Respiratory:  Negative for cough, shortness of breath and wheezing.    Cardiovascular:  Negative for chest pain, palpitations and leg swelling.   Gastrointestinal: Negative.    Endocrine: Negative for cold intolerance, heat intolerance and polyuria.   Genitourinary:  Negative for difficulty urinating, dysuria, flank pain and hematuria.   Musculoskeletal:  Negative for arthralgias, back pain, myalgias and  "neck pain.   Skin:  Negative for color change, pallor and rash.   Neurological:  Negative for dizziness, speech difficulty, weakness, numbness and headaches.   Hematological:  Does not bruise/bleed easily.   Psychiatric/Behavioral:  Negative for dysphoric mood and sleep disturbance. The patient is not nervous/anxious.    All other systems reviewed and are negative.   A complete review of systems is negative other than that noted above in the HPI.      Current Medications[1]  Objective   /84 (BP Location: Left arm, Patient Position: Sitting, Cuff Size: Standard)   Pulse 89   Ht 5' 5\" (1.651 m)   Wt 98.6 kg (217 lb 6.4 oz)   LMP 07/06/2025 (Exact Date)   SpO2 98%   BMI 36.18 kg/m²     Physical Exam  Constitutional:       General: She is not in acute distress.     Appearance: She is well-developed. She is not diaphoretic.   HENT:      Head: Normocephalic and atraumatic.     Eyes:      Conjunctiva/sclera: Conjunctivae normal.      Pupils: Pupils are equal, round, and reactive to light.       Cardiovascular:      Rate and Rhythm: Normal rate and regular rhythm.      Heart sounds: Normal heart sounds. No murmur heard.     No friction rub. No gallop.   Pulmonary:      Effort: Pulmonary effort is normal. No respiratory distress.      Breath sounds: Normal breath sounds. No stridor. No wheezing or rales.   Abdominal:      General: Bowel sounds are normal. There is no distension.      Palpations: Abdomen is soft. There is no mass.      Tenderness: There is no abdominal tenderness. There is no guarding or rebound.     Musculoskeletal:         General: No tenderness. Normal range of motion.      Cervical back: Normal range of motion and neck supple.     Skin:     General: Skin is warm and dry.      Coloration: Skin is not pale.      Findings: No erythema or rash.     Neurological:      Mental Status: She is alert and oriented to person, place, and time.     Psychiatric:         Behavior: Behavior normal.      "       Lab Results: I personally reviewed relevant lab results.       Results for orders placed during the hospital encounter of 09/06/23    EGD    Impression  Mild abnormal mucosa; performed cold forceps biopsies  Multiple ulcers in the duodenal bulb and 1st part of the duodenum with clean base (Ilya III); performed cold forceps biopsy  Possible EOE.  Gastric erosions.  Multiple diminutive ulcers within the duodenal bulb and first portion of the duodenum.    RECOMMENDATION:  There is no recommended follow-up for this procedure.    Recommend daily PPI.  Avoid all NSAIDs.  Follow GERD diet.  Follow biopsy results in 2 weeks.        Rajeev Lechuga MD             [1]   Current Outpatient Medications   Medication Sig Dispense Refill    cetirizine (ZyrTEC) 10 mg tablet Take 1 tablet (10 mg total) by mouth daily 90 tablet 1    [START ON 7/21/2025] escitalopram (LEXAPRO) 10 mg tablet Take 0.5 tablets (5 mg total) by mouth daily Do not start before July 21, 2025. 90 tablet 1    hydrOXYzine HCL (ATARAX) 50 mg tablet Take 1 tablet twice daily as needed for anxiety and take 2 tablets nightly as needed for anxiety and insomnia 90 tablet 1    inFLIXimab (REMICADE) 100 mg Inject into a catheter in a vein      ketoconazole (NIZORAL) 2 % shampoo as needed      LORazepam (Ativan) 0.5 mg tablet Take 1 tablet (0.5 mg total) by mouth daily at bedtime as needed for anxiety 30 tablet 1    lurasidone (LATUDA) 80 mg tablet Take 1 tablet (80 mg total) by mouth daily with dinner 90 tablet 1    norethindrone-ethinyl estradiol (Junel FE 1/20) 1-20 MG-MCG per tablet Take 1 tablet by mouth daily 84 tablet 3    ondansetron (ZOFRAN) 4 mg tablet Take 1 tablet (4 mg total) by mouth every 8 (eight) hours as needed for nausea or vomiting 20 tablet 0    pantoprazole (PROTONIX) 40 mg tablet TAKE 1 TABLET DAILY 90 tablet 1    tirzepatide (Zepbound) 5 mg/0.5 mL auto-injector Inject 0.5 mL (5 mg total) under the skin once a week 2 mL 2    topiramate  (TOPAMAX) 25 mg tablet Take 1 tablet by mouth in the morning and 1 tablet before bedtime.       No current facility-administered medications for this visit.

## 2025-07-17 NOTE — PATIENT INSTRUCTIONS
Scheduled date of colonoscopy (as of today): 10/6/2025  Physician performing colonoscopy: Dr. Lechuga  Location of colonoscopy: [facility]  Bowel prep reviewed with patient: Miralax/Dulcolax  Instructions reviewed with patient by: Olivia MANZO  Clearances: N/A

## 2025-08-03 DIAGNOSIS — F41.1 GENERALIZED ANXIETY DISORDER: ICD-10-CM

## 2025-08-04 RX ORDER — HYDROXYZINE HYDROCHLORIDE 50 MG/1
TABLET, FILM COATED ORAL
Qty: 90 TABLET | Refills: 1 | Status: SHIPPED | OUTPATIENT
Start: 2025-08-04

## 2025-08-18 ENCOUNTER — APPOINTMENT (OUTPATIENT)
Dept: LAB | Facility: HOSPITAL | Age: 30
End: 2025-08-18
Attending: INTERNAL MEDICINE
Payer: COMMERCIAL

## 2025-08-18 DIAGNOSIS — Z13.1 SCREENING FOR DIABETES MELLITUS: ICD-10-CM

## 2025-08-18 DIAGNOSIS — Z00.01 ENCOUNTER FOR GENERAL ADULT MEDICAL EXAMINATION WITH ABNORMAL FINDINGS: ICD-10-CM

## 2025-08-18 LAB
EST. AVERAGE GLUCOSE BLD GHB EST-MCNC: 97 MG/DL
HBA1C MFR BLD: 5 %

## 2025-08-18 PROCEDURE — 83036 HEMOGLOBIN GLYCOSYLATED A1C: CPT

## 2025-08-19 LAB
GAMMA INTERFERON BACKGROUND BLD IA-ACNC: 0.04 IU/ML
M TB IFN-G BLD-IMP: NEGATIVE
M TB IFN-G CD4+ BCKGRND COR BLD-ACNC: 0.01 IU/ML
M TB IFN-G CD4+ BCKGRND COR BLD-ACNC: 0.04 IU/ML
MITOGEN IGNF BCKGRD COR BLD-ACNC: 9.03 IU/ML

## 2025-08-22 ENCOUNTER — TELEMEDICINE (OUTPATIENT)
Dept: PSYCHIATRY | Facility: CLINIC | Age: 30
End: 2025-08-22
Payer: COMMERCIAL

## 2025-08-22 DIAGNOSIS — F41.1 GENERALIZED ANXIETY DISORDER: ICD-10-CM

## 2025-08-22 DIAGNOSIS — F31.4 BIPOLAR 1 DISORDER, DEPRESSED, SEVERE (HCC): Primary | ICD-10-CM

## 2025-08-22 PROCEDURE — 99214 OFFICE O/P EST MOD 30 MIN: CPT | Performed by: STUDENT IN AN ORGANIZED HEALTH CARE EDUCATION/TRAINING PROGRAM

## 2025-08-22 RX ORDER — FLUOXETINE 10 MG/1
10 CAPSULE ORAL DAILY
Qty: 7 CAPSULE | Refills: 0 | Status: SHIPPED | OUTPATIENT
Start: 2025-08-22 | End: 2025-08-29